# Patient Record
Sex: FEMALE | Race: WHITE | Employment: PART TIME | URBAN - NONMETROPOLITAN AREA
[De-identification: names, ages, dates, MRNs, and addresses within clinical notes are randomized per-mention and may not be internally consistent; named-entity substitution may affect disease eponyms.]

---

## 2017-05-08 ENCOUNTER — TELEPHONE (OUTPATIENT)
Dept: FAMILY MEDICINE CLINIC | Facility: CLINIC | Age: 62
End: 2017-05-08

## 2017-05-08 NOTE — TELEPHONE ENCOUNTER
She may see me, if uninsured this is a very expensive issue. To diagnose appendicitis takes at the very least an U/S and then to treat removal ie surgery.

## 2017-05-08 NOTE — TELEPHONE ENCOUNTER
Patient said that she has been having pain since last Thursday to her RLQ that has gradually gotten worse. She said she gets very warm and has been sweating but has not checked her temperature.  She went to the chiropractor last week and she pressed on her

## 2017-05-08 NOTE — TELEPHONE ENCOUNTER
Patient advised, she said she does have "[employer]" [facility], she asked if Dr Tiesha Edge would order the ultrasound so she can try to have it done at [facility] today.

## 2017-05-09 ENCOUNTER — LAB ENCOUNTER (OUTPATIENT)
Dept: LAB | Age: 62
End: 2017-05-09
Attending: INTERNAL MEDICINE
Payer: COMMERCIAL

## 2017-05-09 ENCOUNTER — OFFICE VISIT (OUTPATIENT)
Dept: FAMILY MEDICINE CLINIC | Facility: CLINIC | Age: 62
End: 2017-05-09

## 2017-05-09 VITALS
TEMPERATURE: 99 F | SYSTOLIC BLOOD PRESSURE: 136 MMHG | HEART RATE: 88 BPM | WEIGHT: 209.38 LBS | DIASTOLIC BLOOD PRESSURE: 70 MMHG | OXYGEN SATURATION: 99 % | BODY MASS INDEX: 32.48 KG/M2 | HEIGHT: 67.5 IN

## 2017-05-09 DIAGNOSIS — R10.31 RIGHT LOWER QUADRANT ABDOMINAL PAIN: ICD-10-CM

## 2017-05-09 DIAGNOSIS — R53.82 CHRONIC FATIGUE: Primary | ICD-10-CM

## 2017-05-09 DIAGNOSIS — R53.82 CHRONIC FATIGUE: ICD-10-CM

## 2017-05-09 PROCEDURE — 36415 COLL VENOUS BLD VENIPUNCTURE: CPT | Performed by: INTERNAL MEDICINE

## 2017-05-09 PROCEDURE — 80061 LIPID PANEL: CPT | Performed by: INTERNAL MEDICINE

## 2017-05-09 PROCEDURE — 99214 OFFICE O/P EST MOD 30 MIN: CPT | Performed by: INTERNAL MEDICINE

## 2017-05-09 PROCEDURE — 80050 GENERAL HEALTH PANEL: CPT | Performed by: INTERNAL MEDICINE

## 2017-05-09 RX ORDER — CETIRIZINE HYDROCHLORIDE 10 MG/1
10 TABLET ORAL DAILY
COMMUNITY
End: 2018-04-26 | Stop reason: ALTCHOICE

## 2017-05-09 NOTE — PROGRESS NOTES
Carlyle Castrejon is a 57year old female. HPI:   Right lower abdominal pain x 1 week. Pain can be in the flank as well. No change in stool and no blood in the urine. She has been eating ok, just feels very full and bloated.      Current Outpatient Prescri lesions  HEENT: atraumatic, normocephalic,ears and throat are clear  NECK: supple,no adenopathy,no bruits  LUNGS: clear to auscultation  CARDIO: RRR without murmur  GI: good BS's,no masses, tenderness in RLQ, but no rebound.    EXTREMITIES: no cyanosis, clu

## 2017-05-10 ENCOUNTER — TELEPHONE (OUTPATIENT)
Dept: FAMILY MEDICINE CLINIC | Facility: CLINIC | Age: 62
End: 2017-05-10

## 2017-05-10 DIAGNOSIS — M81.0 OSTEOPOROSIS OF LUMBAR SPINE: Primary | ICD-10-CM

## 2017-05-10 DIAGNOSIS — Z12.31 SCREENING MAMMOGRAM, ENCOUNTER FOR: ICD-10-CM

## 2017-05-10 NOTE — TELEPHONE ENCOUNTER
Pt is requesting an order for a mammogram and a dexa.  Per Epic pt is not due for another dexa until 2018 but pt states that due to her numbers, pt was advised to have it sooner by Dr. Rafael Machado of labs placed upfront for pt per pt request

## 2017-05-11 NOTE — TELEPHONE ENCOUNTER
Patient advised, she states that she will hold off on the dexa scan until next year.  Patient advised the mammogram order was faxed to [facility].

## 2017-07-03 ENCOUNTER — OFFICE VISIT (OUTPATIENT)
Dept: FAMILY MEDICINE CLINIC | Facility: CLINIC | Age: 62
End: 2017-07-03

## 2017-07-03 ENCOUNTER — TELEPHONE (OUTPATIENT)
Dept: FAMILY MEDICINE CLINIC | Facility: CLINIC | Age: 62
End: 2017-07-03

## 2017-07-03 VITALS
HEIGHT: 67 IN | HEART RATE: 82 BPM | DIASTOLIC BLOOD PRESSURE: 80 MMHG | TEMPERATURE: 99 F | WEIGHT: 211 LBS | BODY MASS INDEX: 33.12 KG/M2 | SYSTOLIC BLOOD PRESSURE: 130 MMHG

## 2017-07-03 DIAGNOSIS — Z12.39 SCREENING FOR BREAST CANCER: ICD-10-CM

## 2017-07-03 DIAGNOSIS — R42 DIZZINESS: Primary | ICD-10-CM

## 2017-07-03 PROCEDURE — 99213 OFFICE O/P EST LOW 20 MIN: CPT | Performed by: INTERNAL MEDICINE

## 2017-07-03 RX ORDER — FUROSEMIDE 20 MG/1
20 TABLET ORAL 2 TIMES DAILY
Qty: 30 TABLET | Refills: 0 | Status: SHIPPED | OUTPATIENT
Start: 2017-07-03 | End: 2017-07-03

## 2017-07-03 RX ORDER — FUROSEMIDE 20 MG/1
20 TABLET ORAL DAILY
Qty: 30 TABLET | Refills: 0 | COMMUNITY
Start: 2017-07-03 | End: 2017-07-18 | Stop reason: DRUGHIGH

## 2017-07-03 NOTE — PROGRESS NOTES
HPI:   Alec Keen is a 60year old female who presents for upper respiratory symptoms for  10  days. Patient reports dizziness in the AM.        Current Outpatient Prescriptions:  cetirizine 10 MG Oral Tab Take 10 mg by mouth daily.  Disp:  Rfl:    tri pain  NEURO: denies headaches    EXAM:   /80 (BP Location: Right arm, Patient Position: Sitting, Cuff Size: adult)   Pulse 82   Temp 98.8 °F (37.1 °C) (Temporal)   Ht 67\"   Wt 211 lb   BMI 33.05 kg/m²   GENERAL: well developed, well nourished,in no

## 2017-07-03 NOTE — TELEPHONE ENCOUNTER
Patient said she thought Dr Lobito Mckoy wanted her to take Furosemide once daily the directions say to take twice daily.

## 2017-07-06 ENCOUNTER — HOSPITAL ENCOUNTER (OUTPATIENT)
Dept: MAMMOGRAPHY | Age: 62
Discharge: HOME | End: 2017-07-06
Attending: INTERNAL MEDICINE
Payer: COMMERCIAL

## 2017-07-06 DIAGNOSIS — Z12.39 SCREENING FOR BREAST CANCER: ICD-10-CM

## 2017-07-06 PROCEDURE — 77067 SCR MAMMO BI INCL CAD: CPT | Performed by: INTERNAL MEDICINE

## 2017-07-17 ENCOUNTER — OFFICE VISIT (OUTPATIENT)
Dept: FAMILY MEDICINE CLINIC | Facility: CLINIC | Age: 62
End: 2017-07-17

## 2017-07-17 ENCOUNTER — APPOINTMENT (OUTPATIENT)
Dept: LAB | Age: 62
End: 2017-07-17
Attending: INTERNAL MEDICINE
Payer: COMMERCIAL

## 2017-07-17 VITALS
WEIGHT: 209.38 LBS | HEART RATE: 80 BPM | TEMPERATURE: 98 F | HEIGHT: 67 IN | BODY MASS INDEX: 32.86 KG/M2 | RESPIRATION RATE: 18 BRPM | DIASTOLIC BLOOD PRESSURE: 70 MMHG | SYSTOLIC BLOOD PRESSURE: 118 MMHG

## 2017-07-17 DIAGNOSIS — I10 HTN (HYPERTENSION), BENIGN: Primary | ICD-10-CM

## 2017-07-17 DIAGNOSIS — I10 HTN (HYPERTENSION), BENIGN: ICD-10-CM

## 2017-07-17 LAB
ALBUMIN SERPL-MCNC: 3.9 G/DL (ref 3.5–4.8)
ALP LIVER SERPL-CCNC: 82 U/L (ref 50–130)
ALT SERPL-CCNC: 25 U/L (ref 14–54)
AST SERPL-CCNC: 17 U/L (ref 15–41)
BILIRUB SERPL-MCNC: 0.4 MG/DL (ref 0.1–2)
BUN BLD-MCNC: 10 MG/DL (ref 8–20)
CALCIUM BLD-MCNC: 9 MG/DL (ref 8.3–10.3)
CHLORIDE: 107 MMOL/L (ref 101–111)
CO2: 24 MMOL/L (ref 22–32)
CREAT BLD-MCNC: 0.59 MG/DL (ref 0.55–1.02)
GLUCOSE BLD-MCNC: 85 MG/DL (ref 70–99)
M PROTEIN MFR SERPL ELPH: 7.2 G/DL (ref 6.1–8.3)
POTASSIUM SERPL-SCNC: 3.8 MMOL/L (ref 3.6–5.1)
SODIUM SERPL-SCNC: 141 MMOL/L (ref 136–144)

## 2017-07-17 PROCEDURE — 99214 OFFICE O/P EST MOD 30 MIN: CPT | Performed by: INTERNAL MEDICINE

## 2017-07-17 PROCEDURE — 36415 COLL VENOUS BLD VENIPUNCTURE: CPT | Performed by: INTERNAL MEDICINE

## 2017-07-17 PROCEDURE — 80053 COMPREHEN METABOLIC PANEL: CPT | Performed by: INTERNAL MEDICINE

## 2017-07-17 RX ORDER — FUROSEMIDE 20 MG/1
20 TABLET ORAL DAILY
Qty: 90 TABLET | Refills: 0 | Status: SHIPPED | OUTPATIENT
Start: 2017-07-17 | End: 2017-07-18 | Stop reason: DRUGHIGH

## 2017-07-17 NOTE — PROGRESS NOTES
Vernon Grier is a 60year old female. HPI:   Patient presents for recheck of her hypertension. Pt has been taking medications as instructed, no medication side effects, home BP monitoring in the range of 347-985'R systolic and 05-54'R diastolic. Date   • Abdominal pain, unspecified site 07/07/2011   • Acute sinusitis, unspecified 08/20/2011   • Allergic rhinitis, cause unspecified 3/29/2010   • Bronchitis, not specified as acute or chronic 3/17/2011   • Disequilibrium    • Dizziness and giddiness plan.  The patient is asked to return in 6 week. Dalia Vee

## 2017-07-18 ENCOUNTER — TELEPHONE (OUTPATIENT)
Dept: FAMILY MEDICINE CLINIC | Facility: CLINIC | Age: 62
End: 2017-07-18

## 2017-07-18 DIAGNOSIS — E87.6 HYPOPOTASSEMIA: Primary | ICD-10-CM

## 2017-07-18 RX ORDER — FUROSEMIDE 20 MG/1
TABLET ORAL
COMMUNITY
Start: 2017-07-18 | End: 2017-07-19

## 2017-07-18 NOTE — TELEPHONE ENCOUNTER
FUROSEMIDE 20MG IS TOO SMALL TO CUT IN HALF, ASKING FOR FUROSEMIDE 10MG  CALL TO [facility] IN [place].     HAS NOT PICKED UP OTHER SCRIPT

## 2017-07-18 NOTE — TELEPHONE ENCOUNTER
----- Message from Geovany Vasques MD sent at 7/18/2017  8:09 AM CDT -----  Potassium 3.8 was 4.1 before lasix we got good diuresis and cut the dose to 10 mg and recheck potassium with OV in 3 months.

## 2017-07-19 RX ORDER — FUROSEMIDE 20 MG/1
TABLET ORAL
Qty: 45 TABLET | Refills: 1 | Status: SHIPPED | OUTPATIENT
Start: 2017-07-19 | End: 2018-03-20

## 2017-10-09 ENCOUNTER — OFFICE VISIT (OUTPATIENT)
Dept: FAMILY MEDICINE CLINIC | Facility: CLINIC | Age: 62
End: 2017-10-09

## 2017-10-09 ENCOUNTER — TELEPHONE (OUTPATIENT)
Dept: FAMILY MEDICINE CLINIC | Facility: CLINIC | Age: 62
End: 2017-10-09

## 2017-10-09 VITALS — RESPIRATION RATE: 16 BRPM | BODY MASS INDEX: 32.27 KG/M2 | HEIGHT: 67.5 IN | WEIGHT: 208 LBS

## 2017-10-09 DIAGNOSIS — J40 BRONCHITIS: Primary | ICD-10-CM

## 2017-10-09 PROCEDURE — 99213 OFFICE O/P EST LOW 20 MIN: CPT | Performed by: NURSE PRACTITIONER

## 2017-10-09 RX ORDER — ALBUTEROL SULFATE 90 UG/1
2 AEROSOL, METERED RESPIRATORY (INHALATION) EVERY 4 HOURS PRN
Qty: 1 INHALER | Refills: 0 | Status: SHIPPED | OUTPATIENT
Start: 2017-10-09 | End: 2018-03-20 | Stop reason: ALTCHOICE

## 2017-10-09 RX ORDER — AZITHROMYCIN 250 MG/1
TABLET, FILM COATED ORAL
Qty: 6 TABLET | Refills: 0 | Status: SHIPPED | OUTPATIENT
Start: 2017-10-09 | End: 2017-10-16 | Stop reason: ALTCHOICE

## 2017-10-09 RX ORDER — CODEINE PHOSPHATE AND GUAIFENESIN 10; 100 MG/5ML; MG/5ML
10 SOLUTION ORAL 3 TIMES DAILY PRN
Qty: 180 ML | Refills: 0 | Status: SHIPPED | OUTPATIENT
Start: 2017-10-09 | End: 2018-03-20 | Stop reason: ALTCHOICE

## 2017-10-09 NOTE — TELEPHONE ENCOUNTER
HAS HAD A COLD FOR LAST 5 DAYS, FEELS LIKE IT HAS GONE INTO HER CHEST, HAS BEEN USING INHALER, IT'S THE ONLY WAY SHE CAN SLEEP AT NIGHT, WANTS APPT TODAY, NO OPENINGS, CALL PT

## 2017-10-09 NOTE — PROGRESS NOTES
CHIEF COMPLAINT:   Patient presents with:  Cough/URI: x 1 week        HPI:   Yimi Anaya is a 60year old female who presents for cough for  1  weeks. Cough started gradually and is described as tight and deep. Patient has history of bronchitis.  This • Other urinary incontinence 3/29/2010   • Pure hypercholesterolemia 3/29/2010   • Sprain of neck 1/20/2011      Social History:  Smoking status: Never Smoker                                                              Smokeless tobacco: Never Used Albuterol Sulfate  (90 Base) MCG/ACT Inhalation Aero Soln 1 Inhaler 0      Sig: Inhale 2 puffs into the lungs every 4 (four) hours as needed for Wheezing (cough).       azithromycin 250 MG Oral Tab 6 tablet 0      Sig: Take two tablets by mouth toda · Your appetite may be poor, so a light diet is fine. Avoid dehydration by drinking 6 to 8 glasses of fluids per day (such as water, soft drinks, sports drinks, juices, tea, or soup). Extra fluids will help loosen secretions in the nose and lungs.   · Over-

## 2017-10-16 ENCOUNTER — OFFICE VISIT (OUTPATIENT)
Dept: FAMILY MEDICINE CLINIC | Facility: CLINIC | Age: 62
End: 2017-10-16

## 2017-10-16 ENCOUNTER — APPOINTMENT (OUTPATIENT)
Dept: LAB | Age: 62
End: 2017-10-16
Attending: INTERNAL MEDICINE
Payer: COMMERCIAL

## 2017-10-16 VITALS
BODY MASS INDEX: 32 KG/M2 | DIASTOLIC BLOOD PRESSURE: 82 MMHG | SYSTOLIC BLOOD PRESSURE: 120 MMHG | TEMPERATURE: 99 F | WEIGHT: 208 LBS

## 2017-10-16 DIAGNOSIS — E87.6 HYPOKALEMIA: ICD-10-CM

## 2017-10-16 DIAGNOSIS — E87.6 HYPOKALEMIA: Primary | ICD-10-CM

## 2017-10-16 DIAGNOSIS — E87.6 HYPOPOTASSEMIA: ICD-10-CM

## 2017-10-16 PROCEDURE — 99214 OFFICE O/P EST MOD 30 MIN: CPT | Performed by: INTERNAL MEDICINE

## 2017-10-16 PROCEDURE — 80053 COMPREHEN METABOLIC PANEL: CPT | Performed by: INTERNAL MEDICINE

## 2017-10-16 PROCEDURE — 84439 ASSAY OF FREE THYROXINE: CPT | Performed by: INTERNAL MEDICINE

## 2017-10-16 PROCEDURE — 36415 COLL VENOUS BLD VENIPUNCTURE: CPT | Performed by: INTERNAL MEDICINE

## 2017-10-16 PROCEDURE — 84443 ASSAY THYROID STIM HORMONE: CPT | Performed by: INTERNAL MEDICINE

## 2017-10-16 NOTE — PROGRESS NOTES
Brandie Ramirez is a 60year old female. HPI:   Irritated cough.   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      Current Outpatient Prescriptions:  Albuterol Sulfate  (90 Base) MCG/ACT Inhalation Aero Soln Inhale 2 puffs into th exertion  GI: denies abdominal pain and denies heartburn  NEURO: denies headaches    EXAM:   /80   Temp 99 °F (37.2 °C) (Temporal)   Wt 208 lb   BMI 32.10 kg/m²   GENERAL: well developed, well nourished,in no apparent distress  SKIN: no rashes,no vitor

## 2018-01-09 ENCOUNTER — OFFICE VISIT (OUTPATIENT)
Dept: FAMILY MEDICINE CLINIC | Facility: CLINIC | Age: 63
End: 2018-01-09

## 2018-01-09 ENCOUNTER — TELEPHONE (OUTPATIENT)
Dept: FAMILY MEDICINE CLINIC | Facility: CLINIC | Age: 63
End: 2018-01-09

## 2018-01-09 VITALS
WEIGHT: 212 LBS | HEIGHT: 67.5 IN | DIASTOLIC BLOOD PRESSURE: 70 MMHG | BODY MASS INDEX: 32.89 KG/M2 | TEMPERATURE: 98 F | SYSTOLIC BLOOD PRESSURE: 136 MMHG

## 2018-01-09 DIAGNOSIS — L03.012 PARONYCHIA OF FINGER, LEFT: Primary | ICD-10-CM

## 2018-01-09 PROCEDURE — 99214 OFFICE O/P EST MOD 30 MIN: CPT | Performed by: INTERNAL MEDICINE

## 2018-01-09 RX ORDER — CEPHALEXIN 750 MG/1
750 CAPSULE ORAL 4 TIMES DAILY
Qty: 40 CAPSULE | Refills: 0 | Status: SHIPPED | OUTPATIENT
Start: 2018-01-09 | End: 2018-01-09

## 2018-01-09 RX ORDER — CEPHALEXIN 750 MG/1
750 CAPSULE ORAL 4 TIMES DAILY
Qty: 40 CAPSULE | Refills: 0 | Status: SHIPPED | OUTPATIENT
Start: 2018-01-09 | End: 2018-03-20

## 2018-01-09 NOTE — PROGRESS NOTES
Jaison Turner is a 60year old female. HPI:   Pt has an infection on the left index finger for the past 4 days. She has been soaking it, but it has not decompressed.       Current Outpatient Prescriptions:  cephALEXin (KEFLEX) 750 MG Oral Cap Take 1 cap feels well otherwise  SKIN: denies any unusual skin lesions or rashes  RESPIRATORY: denies shortness of breath with exertion  CARDIOVASCULAR: denies chest pain on exertion  GI: denies abdominal pain and denies heartburn  NEURO: denies headaches    EXAM:

## 2018-01-09 NOTE — TELEPHONE ENCOUNTER
The med that was called to [facility] today is out of stock and they will not be getting any until tomorrow. She is asking if this med could be called to [facility] in [facility], [facility]..

## 2018-03-20 ENCOUNTER — LABORATORY ENCOUNTER (OUTPATIENT)
Dept: LAB | Age: 63
End: 2018-03-20
Attending: INTERNAL MEDICINE
Payer: COMMERCIAL

## 2018-03-20 ENCOUNTER — OFFICE VISIT (OUTPATIENT)
Dept: FAMILY MEDICINE CLINIC | Facility: CLINIC | Age: 63
End: 2018-03-20

## 2018-03-20 VITALS
OXYGEN SATURATION: 97 % | HEART RATE: 73 BPM | TEMPERATURE: 98 F | DIASTOLIC BLOOD PRESSURE: 70 MMHG | HEIGHT: 67 IN | WEIGHT: 211.13 LBS | BODY MASS INDEX: 33.14 KG/M2 | SYSTOLIC BLOOD PRESSURE: 138 MMHG

## 2018-03-20 DIAGNOSIS — L30.9 DERMATITIS: ICD-10-CM

## 2018-03-20 DIAGNOSIS — H25.012 CORTICAL AGE-RELATED CATARACT OF LEFT EYE: ICD-10-CM

## 2018-03-20 DIAGNOSIS — Z00.00 WELL ADULT EXAM: Primary | ICD-10-CM

## 2018-03-20 DIAGNOSIS — M85.80 OSTEOPENIA, UNSPECIFIED LOCATION: ICD-10-CM

## 2018-03-20 DIAGNOSIS — B96.89 BV (BACTERIAL VAGINOSIS): ICD-10-CM

## 2018-03-20 DIAGNOSIS — N76.0 BV (BACTERIAL VAGINOSIS): ICD-10-CM

## 2018-03-20 DIAGNOSIS — Z00.00 WELL ADULT EXAM: ICD-10-CM

## 2018-03-20 LAB
25-HYDROXYVITAMIN D (TOTAL): 57.2 NG/ML (ref 30–100)
ALBUMIN SERPL-MCNC: 4.1 G/DL (ref 3.5–4.8)
ALP LIVER SERPL-CCNC: 83 U/L (ref 50–130)
ALT SERPL-CCNC: 28 U/L (ref 14–54)
AST SERPL-CCNC: 17 U/L (ref 15–41)
BASOPHILS # BLD AUTO: 0.07 X10(3) UL (ref 0–0.1)
BASOPHILS NFR BLD AUTO: 1 %
BILIRUB SERPL-MCNC: 0.6 MG/DL (ref 0.1–2)
BUN BLD-MCNC: 12 MG/DL (ref 8–20)
CALCIUM BLD-MCNC: 8.6 MG/DL (ref 8.3–10.3)
CHLORIDE: 107 MMOL/L (ref 101–111)
CHOLEST SMN-MCNC: 261 MG/DL (ref ?–200)
CO2: 23 MMOL/L (ref 22–32)
CREAT BLD-MCNC: 0.71 MG/DL (ref 0.55–1.02)
EOSINOPHIL # BLD AUTO: 0.13 X10(3) UL (ref 0–0.3)
EOSINOPHIL NFR BLD AUTO: 1.9 %
ERYTHROCYTE [DISTWIDTH] IN BLOOD BY AUTOMATED COUNT: 12.9 % (ref 11.5–16)
GLUCOSE BLD-MCNC: 99 MG/DL (ref 70–99)
HCT VFR BLD AUTO: 42.8 % (ref 34–50)
HDLC SERPL-MCNC: 49 MG/DL (ref 45–?)
HDLC SERPL: 5.33 {RATIO} (ref ?–4.44)
HGB BLD-MCNC: 14.2 G/DL (ref 12–16)
IMMATURE GRANULOCYTE COUNT: 0.01 X10(3) UL (ref 0–1)
IMMATURE GRANULOCYTE RATIO %: 0.1 %
LDLC SERPL CALC-MCNC: 180 MG/DL (ref ?–130)
LYMPHOCYTES # BLD AUTO: 1.71 X10(3) UL (ref 0.9–4)
LYMPHOCYTES NFR BLD AUTO: 25.4 %
M PROTEIN MFR SERPL ELPH: 7.4 G/DL (ref 6.1–8.3)
MCH RBC QN AUTO: 29.9 PG (ref 27–33.2)
MCHC RBC AUTO-ENTMCNC: 33.2 G/DL (ref 31–37)
MCV RBC AUTO: 90.1 FL (ref 81–100)
MONOCYTES # BLD AUTO: 0.86 X10(3) UL (ref 0.1–1)
MONOCYTES NFR BLD AUTO: 12.8 %
NEUTROPHIL ABS PRELIM: 3.94 X10 (3) UL (ref 1.3–6.7)
NEUTROPHILS # BLD AUTO: 3.94 X10(3) UL (ref 1.3–6.7)
NEUTROPHILS NFR BLD AUTO: 58.8 %
NONHDLC SERPL-MCNC: 212 MG/DL (ref ?–130)
PLATELET # BLD AUTO: 314 10(3)UL (ref 150–450)
POTASSIUM SERPL-SCNC: 3.9 MMOL/L (ref 3.6–5.1)
RBC # BLD AUTO: 4.75 X10(6)UL (ref 3.8–5.1)
RED CELL DISTRIBUTION WIDTH-SD: 42.6 FL (ref 35.1–46.3)
SODIUM SERPL-SCNC: 141 MMOL/L (ref 136–144)
TRIGL SERPL-MCNC: 159 MG/DL (ref ?–150)
TSI SER-ACNC: 0.96 MIU/ML (ref 0.35–5.5)
VLDLC SERPL CALC-MCNC: 32 MG/DL (ref 5–40)
WBC # BLD AUTO: 6.7 X10(3) UL (ref 4–13)

## 2018-03-20 PROCEDURE — 82306 VITAMIN D 25 HYDROXY: CPT

## 2018-03-20 PROCEDURE — 84443 ASSAY THYROID STIM HORMONE: CPT

## 2018-03-20 PROCEDURE — 36415 COLL VENOUS BLD VENIPUNCTURE: CPT

## 2018-03-20 PROCEDURE — 80053 COMPREHEN METABOLIC PANEL: CPT

## 2018-03-20 PROCEDURE — 80061 LIPID PANEL: CPT

## 2018-03-20 PROCEDURE — 99396 PREV VISIT EST AGE 40-64: CPT | Performed by: INTERNAL MEDICINE

## 2018-03-20 PROCEDURE — 85025 COMPLETE CBC W/AUTO DIFF WBC: CPT

## 2018-03-20 RX ORDER — DIAPER,BRIEF,INFANT-TODD,DISP
1 EACH MISCELLANEOUS 2 TIMES DAILY
Qty: 30 G | Refills: 0 | Status: SHIPPED | OUTPATIENT
Start: 2018-03-20 | End: 2018-04-26 | Stop reason: ALTCHOICE

## 2018-03-20 RX ORDER — METRONIDAZOLE 500 MG/1
500 TABLET ORAL 3 TIMES DAILY
Qty: 21 TABLET | Refills: 0 | Status: SHIPPED | OUTPATIENT
Start: 2018-03-20 | End: 2018-04-26 | Stop reason: ALTCHOICE

## 2018-03-20 RX ORDER — FUROSEMIDE 20 MG/1
TABLET ORAL
Qty: 45 TABLET | Refills: 1 | Status: SHIPPED | OUTPATIENT
Start: 2018-03-20 | End: 2018-07-10 | Stop reason: ALTCHOICE

## 2018-03-20 NOTE — PROGRESS NOTES
HPI:   Carlyle Castrejon is a 60year old female who presents for a complete physical exam. Symptoms: denies discharge, itching, burning or dysuria, is menopausal. Patient complains of right hip and leg pain x 2 years progressive, using massage and tumeric s History:   Diagnosis Date   • Abdominal pain, unspecified site 07/07/2011   • Acute sinusitis, unspecified 08/20/2011   • Allergic rhinitis, cause unspecified 3/29/2010   • Bronchitis, not specified as acute or chronic 3/17/2011   • Disequilibrium    • Froilan Body mass index is 33.07 kg/m².    GENERAL: well developed, well nourished,in no apparent distress  SKIN: no rashes,no suspicious lesions  HEENT: atraumatic, normocephalic,ears and throat are clear  EYES:PERRLA, EOMI, normal optic disk,conjunctiva are karina

## 2018-03-21 ENCOUNTER — TELEPHONE (OUTPATIENT)
Dept: FAMILY MEDICINE CLINIC | Facility: CLINIC | Age: 63
End: 2018-03-21

## 2018-03-22 ENCOUNTER — MED REC SCAN ONLY (OUTPATIENT)
Dept: FAMILY MEDICINE CLINIC | Facility: CLINIC | Age: 63
End: 2018-03-22

## 2018-04-04 ENCOUNTER — TELEPHONE (OUTPATIENT)
Dept: FAMILY MEDICINE CLINIC | Facility: CLINIC | Age: 63
End: 2018-04-04

## 2018-04-04 DIAGNOSIS — H26.9 CATARACT OF RIGHT EYE, UNSPECIFIED CATARACT TYPE: Primary | ICD-10-CM

## 2018-04-04 NOTE — TELEPHONE ENCOUNTER
Pt went to optometrist for the cataract surgery for her left eye. Looks like they are going to need to do the right eye also. Pt thinks she is going to need a referral for the right eye then also.

## 2018-04-26 ENCOUNTER — OFFICE VISIT (OUTPATIENT)
Dept: FAMILY MEDICINE CLINIC | Facility: CLINIC | Age: 63
End: 2018-04-26

## 2018-04-26 VITALS
WEIGHT: 208 LBS | BODY MASS INDEX: 32.65 KG/M2 | OXYGEN SATURATION: 97 % | TEMPERATURE: 98 F | DIASTOLIC BLOOD PRESSURE: 78 MMHG | HEART RATE: 75 BPM | SYSTOLIC BLOOD PRESSURE: 130 MMHG | HEIGHT: 67 IN

## 2018-04-26 DIAGNOSIS — Z01.810 PREOP CARDIOVASCULAR EXAM: Primary | ICD-10-CM

## 2018-04-26 DIAGNOSIS — H25.013 CORTICAL AGE-RELATED CATARACT OF BOTH EYES: ICD-10-CM

## 2018-04-26 PROCEDURE — 93000 ELECTROCARDIOGRAM COMPLETE: CPT | Performed by: INTERNAL MEDICINE

## 2018-04-26 PROCEDURE — 99243 OFF/OP CNSLTJ NEW/EST LOW 30: CPT | Performed by: INTERNAL MEDICINE

## 2018-04-26 RX ORDER — OMEPRAZOLE 20 MG/1
20 TABLET, DELAYED RELEASE ORAL DAILY
COMMUNITY
End: 2018-07-10 | Stop reason: ALTCHOICE

## 2018-04-26 RX ORDER — CETIRIZINE HYDROCHLORIDE 10 MG/1
10 TABLET ORAL DAILY
COMMUNITY
End: 2019-10-25 | Stop reason: ALTCHOICE

## 2018-04-29 NOTE — PROGRESS NOTES
Errol Ghotra is a 60year old female who presents for a pre-operative physical exam.   Errol Ghotra is scheduled for a cataract procedure at [facility] on 5/24/018 performed by Dr Jose Acevedo. HPI related to surgery:   Pt is in good health.      She Blood pressure 130/78, pulse 75, temperature 98.4 °F (36.9 °C), temperature source Temporal, height 67\", weight 208 lb, SpO2 97 %. General: No acute distress. Alert and oriented x 3. HEENT: Moist mucous membranes. EOM-I. PERRL  Neck: No lymphadenopathy.

## 2018-05-10 ENCOUNTER — TELEPHONE (OUTPATIENT)
Dept: FAMILY MEDICINE CLINIC | Facility: CLINIC | Age: 63
End: 2018-05-10

## 2018-05-10 NOTE — TELEPHONE ENCOUNTER
NEED H&P AND SURGICAL CLEARANCE [facility] TO [facility] [place].   FAX: [phone]

## 2018-05-31 ENCOUNTER — HOSPITAL ENCOUNTER (OUTPATIENT)
Dept: BONE DENSITY | Age: 63
Discharge: HOME | End: 2018-05-31
Attending: INTERNAL MEDICINE
Payer: COMMERCIAL

## 2018-05-31 DIAGNOSIS — Z00.00 WELL ADULT EXAM: ICD-10-CM

## 2018-05-31 PROCEDURE — 77080 DXA BONE DENSITY AXIAL: CPT | Performed by: INTERNAL MEDICINE

## 2018-06-01 ENCOUNTER — TELEPHONE (OUTPATIENT)
Dept: FAMILY MEDICINE CLINIC | Facility: CLINIC | Age: 63
End: 2018-06-01

## 2018-07-10 ENCOUNTER — TELEPHONE (OUTPATIENT)
Dept: FAMILY MEDICINE CLINIC | Facility: CLINIC | Age: 63
End: 2018-07-10

## 2018-07-10 ENCOUNTER — OFFICE VISIT (OUTPATIENT)
Dept: FAMILY MEDICINE CLINIC | Facility: CLINIC | Age: 63
End: 2018-07-10

## 2018-07-10 VITALS
DIASTOLIC BLOOD PRESSURE: 70 MMHG | HEART RATE: 83 BPM | HEIGHT: 67 IN | OXYGEN SATURATION: 97 % | SYSTOLIC BLOOD PRESSURE: 142 MMHG | WEIGHT: 211.5 LBS | TEMPERATURE: 99 F | BODY MASS INDEX: 33.19 KG/M2

## 2018-07-10 DIAGNOSIS — R10.84 DIFFUSE ABDOMINAL PAIN: Primary | ICD-10-CM

## 2018-07-10 PROCEDURE — 99214 OFFICE O/P EST MOD 30 MIN: CPT | Performed by: INTERNAL MEDICINE

## 2018-07-10 RX ORDER — GARLIC EXTRACT 500 MG
1 CAPSULE ORAL DAILY
COMMUNITY
End: 2018-11-17 | Stop reason: ALTCHOICE

## 2018-07-10 NOTE — PROGRESS NOTES
Della Art is a 58year old female. HPI:   Pt has nausea and bloating. She has 2 small bowel movements this morning. But she usually has 3 a day and BIG ones.  She has felt a little better,    Current Outpatient Prescriptions:  Acidophilus/Pectin Demario Razo cyanosis, clubbing or edema    ASSESSMENT AND PLAN:     Diffuse abdominal pain  (primary encounter diagnosis)  Liquid diet, rest if anything worse call or go to ER for evaluation, possibilities are partial SBO, small bowel inflammation, diverticulitis, pan

## 2018-07-10 NOTE — TELEPHONE ENCOUNTER
Patient said that she came home after doing a massage last night and she has pain across her flank and to her abdomen near her umbilicus. She said that she also has a headache and her sphincter is tender.  She has been urinating frequently for the last coup

## 2018-07-12 ENCOUNTER — TELEPHONE (OUTPATIENT)
Dept: FAMILY MEDICINE CLINIC | Facility: CLINIC | Age: 63
End: 2018-07-12

## 2018-07-12 DIAGNOSIS — R10.31 ABDOMINAL PAIN, RLQ: Primary | ICD-10-CM

## 2018-07-12 NOTE — TELEPHONE ENCOUNTER
I would suggest she gets a CT abd/pelvic noncontrast, can ne done in [facility]

## 2018-07-12 NOTE — TELEPHONE ENCOUNTER
Patient said that the pain has \"eased up\" and th bloating has decreased. She said she still has some residual pian in her lower abdomen and \"sphincter\". She has been doing a liquid diet and drinking ensure. She did have some watermelon today.  She said

## 2018-07-12 NOTE — TELEPHONE ENCOUNTER
NGUYEN for patient to C[facility], order in [facility], can call central scheduling to have done through [facility] since she is IHP.

## 2018-07-13 ENCOUNTER — TELEPHONE (OUTPATIENT)
Dept: FAMILY MEDICINE CLINIC | Facility: CLINIC | Age: 63
End: 2018-07-13

## 2018-07-13 NOTE — TELEPHONE ENCOUNTER
Pt returned call. Pt was advised of central scheduling number and locations for CT scans. Orders are already placed.

## 2018-07-14 ENCOUNTER — TELEPHONE (OUTPATIENT)
Dept: FAMILY MEDICINE CLINIC | Facility: CLINIC | Age: 63
End: 2018-07-14

## 2018-07-14 ENCOUNTER — HOSPITAL ENCOUNTER (OUTPATIENT)
Dept: CT IMAGING | Facility: HOSPITAL | Age: 63
Discharge: HOME | End: 2018-07-14
Attending: INTERNAL MEDICINE
Payer: COMMERCIAL

## 2018-07-14 DIAGNOSIS — R10.31 ABDOMINAL PAIN, RLQ: ICD-10-CM

## 2018-07-14 PROCEDURE — 74176 CT ABD & PELVIS W/O CONTRAST: CPT | Performed by: INTERNAL MEDICINE

## 2018-07-16 ENCOUNTER — OFFICE VISIT (OUTPATIENT)
Dept: FAMILY MEDICINE CLINIC | Facility: CLINIC | Age: 63
End: 2018-07-16

## 2018-07-16 VITALS
BODY MASS INDEX: 32.65 KG/M2 | WEIGHT: 208 LBS | HEART RATE: 68 BPM | DIASTOLIC BLOOD PRESSURE: 70 MMHG | OXYGEN SATURATION: 97 % | HEIGHT: 67 IN | SYSTOLIC BLOOD PRESSURE: 126 MMHG | TEMPERATURE: 99 F

## 2018-07-16 DIAGNOSIS — K57.92 DIVERTICULITIS: Primary | ICD-10-CM

## 2018-07-16 PROCEDURE — 99214 OFFICE O/P EST MOD 30 MIN: CPT | Performed by: INTERNAL MEDICINE

## 2018-07-17 NOTE — PROGRESS NOTES
Karin Paulson is a 58year old female. HPI:   Feeling better. She has been taking levaquin and flagyl and less distended.      Current Outpatient Prescriptions:  metRONIDAZOLE (FLAGYL) 500 MG Oral Tab Take 1 tablet (500 mg total) by mouth 3 (three) ti clear  NECK: supple,no adenopathy,no bruits  LUNGS: clear to auscultation  CARDIO: RRR without murmur  GI: good BS's,no masses, HSM or tenderness  EXTREMITIES: no cyanosis, clubbing or edema    ASSESSMENT AND PLAN:   Diverticulitis  (primary encounter diag

## 2018-09-22 ENCOUNTER — TELEPHONE (OUTPATIENT)
Dept: FAMILY MEDICINE CLINIC | Facility: CLINIC | Age: 63
End: 2018-09-22

## 2018-09-22 DIAGNOSIS — Z98.41 STATUS POST RIGHT CATARACT EXTRACTION: Primary | ICD-10-CM

## 2018-09-22 NOTE — TELEPHONE ENCOUNTER
Had cataract surgery in the spring. Currently having an issue with the right eye and wants to have the surgeon look at it. Referral placed, patient to p/u.

## 2018-09-26 ENCOUNTER — OFFICE VISIT (OUTPATIENT)
Dept: FAMILY MEDICINE CLINIC | Facility: CLINIC | Age: 63
End: 2018-09-26

## 2018-09-26 VITALS
TEMPERATURE: 99 F | DIASTOLIC BLOOD PRESSURE: 70 MMHG | WEIGHT: 211 LBS | BODY MASS INDEX: 33 KG/M2 | HEART RATE: 74 BPM | OXYGEN SATURATION: 98 % | SYSTOLIC BLOOD PRESSURE: 136 MMHG

## 2018-09-26 DIAGNOSIS — H65.92 SEROMUCINOUS OTITIS MEDIA, LEFT: Primary | ICD-10-CM

## 2018-09-26 PROCEDURE — 99213 OFFICE O/P EST LOW 20 MIN: CPT | Performed by: FAMILY MEDICINE

## 2018-09-26 RX ORDER — METHYLPREDNISOLONE 4 MG/1
TABLET ORAL
Qty: 1 KIT | Refills: 0 | Status: SHIPPED | OUTPATIENT
Start: 2018-09-26 | End: 2018-10-09 | Stop reason: ALTCHOICE

## 2018-09-26 NOTE — PROGRESS NOTES
Fallon Murry is a 58year old female. Patient presents with:  Dizziness:  LEFT EAR PAIN-- RM 6    Subjective   HPI:   Patient is left ear pain. This is rather new. She has no fever or chills.   She has pain in the left side, and this started in the Encounters:  09/26/18 : 136/70  07/16/18 : 126/70  07/10/18 : 142/70  04/26/18 : 130/78  03/20/18 : 138/70  01/09/18 : 136/70      Wt Readings from Last 6 Encounters:  09/26/18 : 211 lb  07/16/18 : 208 lb  07/10/18 : 211 lb 8 oz  04/26/18 : 208 lb  03/20/1 Tablet Therapy Pack 1 kit 0     Sig: As directed. Brady Higuera M.D., [facility]      The patient indicates understanding of these issues and agrees to the plan.

## 2018-10-09 ENCOUNTER — TELEPHONE (OUTPATIENT)
Dept: FAMILY MEDICINE CLINIC | Facility: CLINIC | Age: 63
End: 2018-10-09

## 2018-10-09 ENCOUNTER — OFFICE VISIT (OUTPATIENT)
Dept: FAMILY MEDICINE CLINIC | Facility: CLINIC | Age: 63
End: 2018-10-09

## 2018-10-09 VITALS
HEART RATE: 80 BPM | WEIGHT: 213 LBS | SYSTOLIC BLOOD PRESSURE: 136 MMHG | TEMPERATURE: 97 F | DIASTOLIC BLOOD PRESSURE: 74 MMHG | BODY MASS INDEX: 33.43 KG/M2 | RESPIRATION RATE: 12 BRPM | HEIGHT: 67 IN

## 2018-10-09 DIAGNOSIS — J40 BRONCHITIS: ICD-10-CM

## 2018-10-09 DIAGNOSIS — J32.9 SINUSITIS, UNSPECIFIED CHRONICITY, UNSPECIFIED LOCATION: Primary | ICD-10-CM

## 2018-10-09 DIAGNOSIS — J40 BRONCHITIS: Primary | ICD-10-CM

## 2018-10-09 PROCEDURE — 99214 OFFICE O/P EST MOD 30 MIN: CPT | Performed by: FAMILY MEDICINE

## 2018-10-09 RX ORDER — ALBUTEROL SULFATE 90 UG/1
2 AEROSOL, METERED RESPIRATORY (INHALATION) EVERY 4 HOURS PRN
Qty: 1 INHALER | Refills: 1 | Status: SHIPPED | OUTPATIENT
Start: 2018-10-09 | End: 2019-04-15 | Stop reason: ALTCHOICE

## 2018-10-09 RX ORDER — AZITHROMYCIN 250 MG/1
TABLET, FILM COATED ORAL
Qty: 6 TABLET | Refills: 0 | Status: SHIPPED | OUTPATIENT
Start: 2018-10-09 | End: 2018-10-30 | Stop reason: ALTCHOICE

## 2018-10-09 RX ORDER — PREDNISONE 20 MG/1
20 TABLET ORAL 2 TIMES DAILY
Qty: 10 TABLET | Refills: 0 | Status: SHIPPED | OUTPATIENT
Start: 2018-10-09 | End: 2018-10-14

## 2018-10-09 RX ORDER — ALBUTEROL SULFATE 90 UG/1
2 AEROSOL, METERED RESPIRATORY (INHALATION) EVERY 4 HOURS PRN
Qty: 1 INHALER | Refills: 0 | Status: SHIPPED | OUTPATIENT
Start: 2018-10-09 | End: 2019-04-15 | Stop reason: ALTCHOICE

## 2018-10-09 NOTE — PROGRESS NOTES
HPI:    Patient ID: Bhavesh Brennan is a 58year old female. X 2 wks  + head sangita / cough    HPI    Review of Systems   Constitutional: Positive for fatigue. Negative for chills and fever. HENT: Positive for sinus pressure and sinus pain.  Negative for placed in this encounter. Meds This Visit:  Requested Prescriptions     Signed Prescriptions Disp Refills   • azithromycin 250 MG Oral Tab 6 tablet 0     Sig: Take two tablets by mouth today, then one daily.    • Albuterol Sulfate  (90 Base) MCG

## 2018-10-09 NOTE — PATIENT INSTRUCTIONS
REST,FLUIDS,ADVIL / TYLENOL PRN fever / body aches  CALL NO CHANGE WORSENING  DISCUSSED WARNING SIGNS  F/U No change 2-3 days  Cont decong  meds as directed

## 2018-10-30 ENCOUNTER — OFFICE VISIT (OUTPATIENT)
Dept: FAMILY MEDICINE CLINIC | Facility: CLINIC | Age: 63
End: 2018-10-30

## 2018-10-30 VITALS
HEART RATE: 78 BPM | DIASTOLIC BLOOD PRESSURE: 80 MMHG | WEIGHT: 214.38 LBS | SYSTOLIC BLOOD PRESSURE: 148 MMHG | BODY MASS INDEX: 34 KG/M2 | TEMPERATURE: 98 F | OXYGEN SATURATION: 97 %

## 2018-10-30 DIAGNOSIS — Z23 NEED FOR VACCINATION: ICD-10-CM

## 2018-10-30 DIAGNOSIS — M79.671 RIGHT FOOT PAIN: Primary | ICD-10-CM

## 2018-10-30 DIAGNOSIS — G47.00 INSOMNIA DISORDER WITH NON-SLEEP DISORDER MENTAL COMORBIDITY: ICD-10-CM

## 2018-10-30 PROCEDURE — 90686 IIV4 VACC NO PRSV 0.5 ML IM: CPT | Performed by: INTERNAL MEDICINE

## 2018-10-30 PROCEDURE — 99214 OFFICE O/P EST MOD 30 MIN: CPT | Performed by: INTERNAL MEDICINE

## 2018-10-30 PROCEDURE — 90471 IMMUNIZATION ADMIN: CPT | Performed by: INTERNAL MEDICINE

## 2018-10-31 NOTE — PROGRESS NOTES
Konstantin Briscoe is a 58year old female. HPI:   Bronchitis getting better, hear to recheck after steroids. She has been sleeping poorly for a month and toña easily. Motivation is poor.   She has pain and swelling in the right ankle since her tendon cys Patient Position: Sitting, Cuff Size: large)   Pulse 78   Temp 97.8 °F (36.6 °C) (Temporal)   Wt 214 lb 6 oz   SpO2 97%   BMI 33.58 kg/m²   GENERAL: well developed, well nourished,in no apparent distress  SKIN: no rashes,no suspicious lesions  HEENT: atrau

## 2018-11-17 ENCOUNTER — HOSPITAL ENCOUNTER (OUTPATIENT)
Dept: GENERAL RADIOLOGY | Age: 63
Discharge: HOME | End: 2018-11-17
Attending: PODIATRIST
Payer: COMMERCIAL

## 2018-11-17 ENCOUNTER — OFFICE VISIT (OUTPATIENT)
Dept: PODIATRY CLINIC | Facility: CLINIC | Age: 63
End: 2018-11-17

## 2018-11-17 DIAGNOSIS — M76.61 ACHILLES BURSITIS OF RIGHT LOWER EXTREMITY: ICD-10-CM

## 2018-11-17 DIAGNOSIS — M77.31 CALCANEAL SPUR OF RIGHT FOOT: Primary | ICD-10-CM

## 2018-11-17 DIAGNOSIS — M92.61 HAGLUND'S DEFORMITY OF RIGHT HEEL: ICD-10-CM

## 2018-11-17 DIAGNOSIS — M77.31 CALCANEAL SPUR OF RIGHT FOOT: ICD-10-CM

## 2018-11-17 PROCEDURE — 73650 X-RAY EXAM OF HEEL: CPT | Performed by: PODIATRIST

## 2018-11-17 PROCEDURE — 99203 OFFICE O/P NEW LOW 30 MIN: CPT | Performed by: PODIATRIST

## 2018-11-20 ENCOUNTER — OFFICE VISIT (OUTPATIENT)
Dept: FAMILY MEDICINE CLINIC | Facility: CLINIC | Age: 63
End: 2018-11-20

## 2018-11-20 VITALS
SYSTOLIC BLOOD PRESSURE: 138 MMHG | WEIGHT: 217 LBS | BODY MASS INDEX: 34 KG/M2 | DIASTOLIC BLOOD PRESSURE: 64 MMHG | HEART RATE: 82 BPM | TEMPERATURE: 98 F | OXYGEN SATURATION: 97 %

## 2018-11-20 DIAGNOSIS — M79.671 RIGHT FOOT PAIN: ICD-10-CM

## 2018-11-20 DIAGNOSIS — M25.512 ACUTE PAIN OF LEFT SHOULDER: Primary | ICD-10-CM

## 2018-11-20 PROCEDURE — 99214 OFFICE O/P EST MOD 30 MIN: CPT | Performed by: INTERNAL MEDICINE

## 2018-11-20 NOTE — PROGRESS NOTES
Milagros Ferrara is a 58year old female. Patient presents with:  Consult: Right heel pain -- No xrays taken. Onset 10/13/2018 pt developed nodules on heel of foot. Site has burning sensation. Heel has had a scab since October 2018.  Icing and elevating fo Maternal Aunt 60   • Breast Cancer Maternal Aunt       Social History    Socioeconomic History      Marital status: OTHER      Spouse name: Not on file      Number of children: Not on file      Years of education: Not on file      Highest education level: heel it is not draining there is no erythema edema but there are multiple nodular masses along the incision line of the right heel. 2. Vascular: She has palpable pulses both dorsalis pedis and posterior tibial   3. Neurologic: The sensation is intact.

## 2018-11-20 NOTE — PROGRESS NOTES
Anne Allen is a 58year old female. HPI:   Pt sleeping better after resolved a personal matter. She has seen the podiatrist and she will have an MRI next week Monday 11/26/2018.   She slipped on the ice last week, fell on her left shoulder and had b Cuff Size: large)   Pulse 82   Temp 97.7 °F (36.5 °C) (Temporal)   Wt 217 lb   SpO2 97%   BMI 33.99 kg/m²   GENERAL: well developed, well nourished,in no apparent distress  SKIN: no rashes,no suspicious lesions  HEENT: atraumatic, normocephalic,ears and th

## 2018-11-26 ENCOUNTER — HOSPITAL ENCOUNTER (OUTPATIENT)
Dept: MRI IMAGING | Age: 63
Discharge: HOME | End: 2018-11-26
Attending: PODIATRIST
Payer: COMMERCIAL

## 2018-11-26 DIAGNOSIS — M92.61 HAGLUND'S DEFORMITY OF RIGHT HEEL: ICD-10-CM

## 2018-11-26 DIAGNOSIS — M76.61 ACHILLES BURSITIS OF RIGHT LOWER EXTREMITY: ICD-10-CM

## 2018-11-26 DIAGNOSIS — M77.31 CALCANEAL SPUR OF RIGHT FOOT: ICD-10-CM

## 2018-11-26 PROCEDURE — 73721 MRI JNT OF LWR EXTRE W/O DYE: CPT | Performed by: PODIATRIST

## 2018-12-01 ENCOUNTER — OFFICE VISIT (OUTPATIENT)
Dept: PODIATRY CLINIC | Facility: CLINIC | Age: 63
End: 2018-12-01

## 2018-12-01 DIAGNOSIS — M77.31 CALCANEAL SPUR OF RIGHT FOOT: Primary | ICD-10-CM

## 2018-12-01 DIAGNOSIS — M76.61 ACHILLES BURSITIS OF RIGHT LOWER EXTREMITY: ICD-10-CM

## 2018-12-01 DIAGNOSIS — M92.61 HAGLUND'S DEFORMITY OF RIGHT HEEL: ICD-10-CM

## 2018-12-01 PROCEDURE — 99213 OFFICE O/P EST LOW 20 MIN: CPT | Performed by: PODIATRIST

## 2018-12-03 ENCOUNTER — TELEPHONE (OUTPATIENT)
Dept: FAMILY MEDICINE CLINIC | Facility: CLINIC | Age: 63
End: 2018-12-03

## 2018-12-03 DIAGNOSIS — M76.61 TENDONITIS, ACHILLES, RIGHT: ICD-10-CM

## 2018-12-03 DIAGNOSIS — M92.61 JUVENILE OSTEOCHONDROSIS OF RIGHT TARSUS: ICD-10-CM

## 2018-12-03 DIAGNOSIS — M77.31 CALCANEAL SPUR OF RIGHT FOOT: Primary | ICD-10-CM

## 2018-12-03 NOTE — TELEPHONE ENCOUNTER
DR BROWNLEE REFERRED PT TO DR Dante Arango. DOES PT STILL NEED DR BROWNLEE'S REFERRAL IF DR YBARRA REFERS PT TO Mike Pickett (PEDIATRIST) FOR A 2ND OPINION.  PLEASE CALL

## 2018-12-03 NOTE — PROGRESS NOTES
Alka Moffett is a 58year old female. Patient presents with:   Follow - Up: here for MRI result review right posterior ankle achilles area, pain 2/10, worse with closed heel shoes         HPI:   This pleasant patient presents to the clinic to review he education: Not on file      Highest education level: Not on file    Tobacco Use      Smoking status: Never Smoker      Smokeless tobacco: Never Used      Tobacco comment: Current Non smoker     Substance and Sexual Activity      Alcohol use: No        Alco trim and debride a hyperkeratotic lesion at that alleviates her symptoms she can get that done periodically as well instead of surgery. Patient understood. The patient indicates understanding of these issues and agrees to the plan.     No Follow-up on f

## 2018-12-20 ENCOUNTER — OFFICE VISIT (OUTPATIENT)
Dept: PODIATRY CLINIC | Facility: CLINIC | Age: 63
End: 2018-12-20

## 2018-12-20 DIAGNOSIS — L84 CALLUS OF FOOT: Primary | ICD-10-CM

## 2018-12-20 PROCEDURE — 99213 OFFICE O/P EST LOW 20 MIN: CPT | Performed by: PODIATRIST

## 2018-12-24 NOTE — PROGRESS NOTES
Isadore Dakin is a 58year old female. Patient presents with: Foot Pain: Pt is here for a follow up on right foot and ankle pain. Posterior achilles tendon area. Pain level 0 now. Pt did see Dr. Marichuy Spencer for a second opinion.          HPI:   Patient pr on file      Highest education level: Not on file    Tobacco Use      Smoking status: Never Smoker      Smokeless tobacco: Never Used      Tobacco comment: Current Non smoker     Substance and Sexual Activity      Alcohol use: No        Alcohol/week: 0.0 o

## 2019-01-03 ENCOUNTER — OFFICE VISIT (OUTPATIENT)
Dept: FAMILY MEDICINE CLINIC | Facility: CLINIC | Age: 64
End: 2019-01-03

## 2019-01-03 VITALS
OXYGEN SATURATION: 96 % | HEIGHT: 67 IN | WEIGHT: 218.5 LBS | DIASTOLIC BLOOD PRESSURE: 80 MMHG | BODY MASS INDEX: 34.29 KG/M2 | TEMPERATURE: 98 F | HEART RATE: 77 BPM | SYSTOLIC BLOOD PRESSURE: 160 MMHG

## 2019-01-03 DIAGNOSIS — R63.5 WEIGHT GAIN: ICD-10-CM

## 2019-01-03 DIAGNOSIS — M77.31 CALCANEAL SPUR OF RIGHT FOOT: Primary | ICD-10-CM

## 2019-01-03 PROCEDURE — 99213 OFFICE O/P EST LOW 20 MIN: CPT | Performed by: INTERNAL MEDICINE

## 2019-01-03 NOTE — PROGRESS NOTES
Milagros Ferrara is a 58year old female. HPI:   Pt has pain in the right achilles. She has been seen podiatry, but would like to go back and see Gerry, who did the surgery. She has gained 10 pounds in the last 2 months.      Current Outpatient Medica well developed, well nourished,in no apparent distress  SKIN: no rashes,no suspicious lesions  HEENT: atraumatic, normocephalic,ears and throat are clear  NECK: supple,no adenopathy,no bruits  LUNGS: clear to auscultation  CARDIO: RRR without murmur  GI: g

## 2019-02-06 ENCOUNTER — TELEPHONE (OUTPATIENT)
Dept: FAMILY MEDICINE CLINIC | Facility: CLINIC | Age: 64
End: 2019-02-06

## 2019-02-06 NOTE — TELEPHONE ENCOUNTER
Here joieo call was about her last BP which was OUT OF RANGE, she just needs it repeated, nurse visit OK, doesn't cost her anything, except time and as I guess.

## 2019-02-06 NOTE — TELEPHONE ENCOUNTER
PT GOT AN AUTOMATED MESSAGE TO MAKE AN APPT. PHYTEL SHOWS CODE FOR HYPERTENSION. PT SAID SHE JUST SAW DR Lennox Donning AND DOESN'T KNOW WHY SHE SHOULD MAKE AN APPT.  PLEASE CALL

## 2019-02-28 ENCOUNTER — NURSE ONLY (OUTPATIENT)
Dept: FAMILY MEDICINE CLINIC | Facility: CLINIC | Age: 64
End: 2019-02-28

## 2019-02-28 VITALS — WEIGHT: 209.19 LBS | DIASTOLIC BLOOD PRESSURE: 76 MMHG | BODY MASS INDEX: 33 KG/M2 | SYSTOLIC BLOOD PRESSURE: 128 MMHG

## 2019-04-15 ENCOUNTER — OFFICE VISIT (OUTPATIENT)
Dept: FAMILY MEDICINE CLINIC | Facility: CLINIC | Age: 64
End: 2019-04-15

## 2019-04-15 VITALS
HEIGHT: 67 IN | HEART RATE: 75 BPM | WEIGHT: 205 LBS | SYSTOLIC BLOOD PRESSURE: 132 MMHG | DIASTOLIC BLOOD PRESSURE: 80 MMHG | TEMPERATURE: 97 F | OXYGEN SATURATION: 97 % | RESPIRATION RATE: 16 BRPM | BODY MASS INDEX: 32.18 KG/M2

## 2019-04-15 DIAGNOSIS — R35.0 URINARY FREQUENCY: Primary | ICD-10-CM

## 2019-04-15 PROCEDURE — 99214 OFFICE O/P EST MOD 30 MIN: CPT | Performed by: FAMILY MEDICINE

## 2019-04-15 PROCEDURE — 87086 URINE CULTURE/COLONY COUNT: CPT | Performed by: FAMILY MEDICINE

## 2019-04-15 PROCEDURE — 81003 URINALYSIS AUTO W/O SCOPE: CPT | Performed by: FAMILY MEDICINE

## 2019-04-15 RX ORDER — CEPHALEXIN 500 MG/1
500 CAPSULE ORAL 3 TIMES DAILY
Qty: 15 CAPSULE | Refills: 0 | Status: SHIPPED | OUTPATIENT
Start: 2019-04-15 | End: 2019-06-10 | Stop reason: ALTCHOICE

## 2019-04-15 NOTE — PROGRESS NOTES
Mila Hernández is a 58year old female. Patient presents with: Other: patient c/o left kidney pain and urinary frequency x 4 days      HPI:   Patient complains of pain to the left lower quadrant/flank that began a few days ago.   She said increased uri any unusual skin lesions or rashes  RESPIRATORY: denies shortness of breath   CARDIOVASCULAR: denies chest pain   GI: denies nausea, vomiting, diarrhea or abdominal pain   NEURO: denies headaches    EXAM:   /80   Pulse 75   Temp 97.1 °F (36.2 °C) (Te mg total) by mouth 3 (three) times daily.        Imaging & Consults:  None

## 2019-06-10 ENCOUNTER — TELEPHONE (OUTPATIENT)
Dept: FAMILY MEDICINE CLINIC | Facility: CLINIC | Age: 64
End: 2019-06-10

## 2019-06-10 ENCOUNTER — HOSPITAL ENCOUNTER (OUTPATIENT)
Dept: MAMMOGRAPHY | Age: 64
Discharge: HOME | End: 2019-06-10
Attending: INTERNAL MEDICINE
Payer: MEDICARE

## 2019-06-10 ENCOUNTER — OFFICE VISIT (OUTPATIENT)
Dept: FAMILY MEDICINE CLINIC | Facility: CLINIC | Age: 64
End: 2019-06-10
Payer: MEDICARE

## 2019-06-10 ENCOUNTER — APPOINTMENT (OUTPATIENT)
Dept: LAB | Age: 64
End: 2019-06-10
Attending: INTERNAL MEDICINE
Payer: MEDICARE

## 2019-06-10 VITALS
DIASTOLIC BLOOD PRESSURE: 70 MMHG | HEIGHT: 67 IN | HEART RATE: 71 BPM | BODY MASS INDEX: 31.72 KG/M2 | WEIGHT: 202.13 LBS | OXYGEN SATURATION: 95 % | SYSTOLIC BLOOD PRESSURE: 134 MMHG | TEMPERATURE: 98 F

## 2019-06-10 DIAGNOSIS — Z13.6 SCREENING FOR CARDIOVASCULAR CONDITION: ICD-10-CM

## 2019-06-10 DIAGNOSIS — E78.00 PURE HYPERCHOLESTEROLEMIA: ICD-10-CM

## 2019-06-10 DIAGNOSIS — Z11.59 NEED FOR HEPATITIS C SCREENING TEST: ICD-10-CM

## 2019-06-10 DIAGNOSIS — Z12.31 VISIT FOR SCREENING MAMMOGRAM: ICD-10-CM

## 2019-06-10 DIAGNOSIS — Z13.1 SCREENING FOR DIABETES MELLITUS: ICD-10-CM

## 2019-06-10 DIAGNOSIS — N39.41 URGE INCONTINENCE: Primary | ICD-10-CM

## 2019-06-10 DIAGNOSIS — M85.80 OSTEOPENIA, UNSPECIFIED LOCATION: ICD-10-CM

## 2019-06-10 DIAGNOSIS — I10 HTN (HYPERTENSION), BENIGN: ICD-10-CM

## 2019-06-10 DIAGNOSIS — M77.31 CALCANEAL SPUR OF RIGHT FOOT: ICD-10-CM

## 2019-06-10 DIAGNOSIS — Z78.0 POSTMENOPAUSAL: ICD-10-CM

## 2019-06-10 DIAGNOSIS — M76.61 TENDONITIS, ACHILLES, RIGHT: ICD-10-CM

## 2019-06-10 DIAGNOSIS — Z00.00 ENCOUNTER FOR ANNUAL HEALTH EXAMINATION: ICD-10-CM

## 2019-06-10 DIAGNOSIS — Z01.419 ENCOUNTER FOR GYNECOLOGICAL EXAMINATION WITHOUT ABNORMAL FINDING: ICD-10-CM

## 2019-06-10 DIAGNOSIS — R35.0 URINARY FREQUENCY: ICD-10-CM

## 2019-06-10 DIAGNOSIS — Z23 NEED FOR VACCINATION: ICD-10-CM

## 2019-06-10 DIAGNOSIS — Z13.0 SCREENING FOR DEFICIENCY ANEMIA: Primary | ICD-10-CM

## 2019-06-10 PROCEDURE — G0101 CA SCREEN;PELVIC/BREAST EXAM: HCPCS | Performed by: INTERNAL MEDICINE

## 2019-06-10 PROCEDURE — G0402 INITIAL PREVENTIVE EXAM: HCPCS | Performed by: INTERNAL MEDICINE

## 2019-06-10 PROCEDURE — 86803 HEPATITIS C AB TEST: CPT

## 2019-06-10 PROCEDURE — 80061 LIPID PANEL: CPT

## 2019-06-10 PROCEDURE — 80053 COMPREHEN METABOLIC PANEL: CPT

## 2019-06-10 PROCEDURE — 36415 COLL VENOUS BLD VENIPUNCTURE: CPT

## 2019-06-10 PROCEDURE — 77067 SCR MAMMO BI INCL CAD: CPT | Performed by: INTERNAL MEDICINE

## 2019-06-10 NOTE — TELEPHONE ENCOUNTER
Patient asked if she is supposed to be going to PT for pelvic floor exercise or if she should try the [facility] Moments.me system. LM for patient to Cb, Dr Alee Bangura wants her to go back to PT and see if this is effective first as the device is very expensive.

## 2019-06-10 NOTE — PROGRESS NOTES
HPI:   Pam Kiran is a 71year old female who presents for a Medicare Initial Preventative Physical Exam (Welcome to Medicare- < 12 months on Medicare). Pt has been well, she is still losing weight. She is eating mostly KETO.  Less carbs and ta tobacco.    CAGE Alcohol screening   Loraine Burdick was screened for Alcohol abuse and had a score of 0 so is at low risk.     Patient Care Team: Patient Care Team:  Paula Alejandra MD as PCP - [facility])  Chadwick Troy MD (UROLOGY)    P includes Breast Cancer in her maternal aunt; Breast Cancer (age of onset: 61) in her maternal aunt; Cancer in an other family member; Other in her mother; Pulmonary Disease in an other family member.    SOCIAL HISTORY:   She  reports that she has never smok tenderness   Throat: Lips, mucosa, and tongue normal; teeth and gums normal   Neck: Supple, symmetrical, trachea midline, no adenopathy;  thyroid: not enlarged, symmetric, no tenderness/mass/nodules; no carotid bruit or JVD   Back:   Symmetric, no curvatur mellitus  -     COMP METABOLIC PANEL (14); Future    Urinary frequency    Calcaneal spur of right foot    Tendonitis, Achilles, right    Osteopenia, unspecified location    HTN (hypertension), benign    Pure hypercholesterolemia    1.  Screening for cardiov Friends;Puzzles;Games; Visiting Family      This section provided for quick review of chart, separate sheet to patient  [address] Internal Lab or Procedure External Lab or Procedure   Diabetes Screening      HbgA1C   An Pneumococcal 13 (Prevnar)  Covered Once after 65 No vaccine history found Please get once after your 65th birthday    Pneumococcal 23 (Pneumovax)  Covered Once after 65 No vaccine history found Please get once after your 65th birthday    Hepatitis B for Mo

## 2019-06-10 NOTE — PATIENT INSTRUCTIONS
Pretty Suarez's SCREENING SCHEDULE   Tests on this list are recommended by your physician but may not be covered, or covered at this frequency, by your insurer. Please check with your insurance carrier before scheduling to verify coverage.    Deuce Huang Men who are 73-68 years old and have smoked more than 100 cigarettes in their lifetime   • Anyone with a family history    Colorectal Cancer Screening  Covered up to Age 76     Colonoscopy Screen   Covered every 10 years- more often if abnormal Colonoscopy previous visit.  Please get every year    Pneumococcal 13 (Prevnar)  Covered Once after 65 Orders placed or performed in visit on 06/10/19   • PNEUMOCOCCAL VACC, 13 ISABELLA IM    Please get once after your 65th birthday    Pneumococcal 23 (Pneumovax)  Covered O

## 2019-06-11 RX ORDER — SIMVASTATIN 40 MG
40 TABLET ORAL NIGHTLY
Qty: 90 TABLET | Refills: 0 | Status: SHIPPED | OUTPATIENT
Start: 2019-06-11 | End: 2019-08-02

## 2019-06-12 ENCOUNTER — TELEPHONE (OUTPATIENT)
Dept: FAMILY MEDICINE CLINIC | Facility: CLINIC | Age: 64
End: 2019-06-12

## 2019-06-12 NOTE — TELEPHONE ENCOUNTER
LM for patient that Dr Latrice Jones ordered a Prevnar 13 when she was in on Monday and she left before we could give it. She can call and schedule a nurse visit.

## 2019-06-17 ENCOUNTER — NURSE ONLY (OUTPATIENT)
Dept: FAMILY MEDICINE CLINIC | Facility: CLINIC | Age: 64
End: 2019-06-17
Payer: MEDICARE

## 2019-06-17 PROCEDURE — 90670 PCV13 VACCINE IM: CPT | Performed by: INTERNAL MEDICINE

## 2019-06-17 PROCEDURE — G0009 ADMIN PNEUMOCOCCAL VACCINE: HCPCS | Performed by: INTERNAL MEDICINE

## 2019-07-02 ENCOUNTER — HOSPITAL ENCOUNTER (OUTPATIENT)
Dept: PHYSICAL THERAPY | Facility: HOSPITAL | Age: 64
Setting detail: THERAPIES SERIES
Discharge: HOME | End: 2019-07-02
Attending: INTERNAL MEDICINE
Payer: MEDICARE

## 2019-07-02 DIAGNOSIS — N39.41 URGE INCONTINENCE: ICD-10-CM

## 2019-07-02 PROCEDURE — 97112 NEUROMUSCULAR REEDUCATION: CPT

## 2019-07-02 PROCEDURE — 97110 THERAPEUTIC EXERCISES: CPT

## 2019-07-02 PROCEDURE — 97162 PT EVAL MOD COMPLEX 30 MIN: CPT

## 2019-07-02 NOTE — PROGRESS NOTES
MUSCULOSKELETAL AND PELVIC FLOOR EVALUATION:   Referring Physician: Dr. Zenon Spatz  Diagnosis: Urge Incontinence     Date of Service: 7/2/2019     PATIENT SUMMARY   Fabrice Feliz is a 71year old y/o female who presents to therapy today with complaints of incontinence .  Current impairments include the following: decreased pelvic floor muscles strength and coordination, decreased abdominal strength, fair bladder/bowel habits , nocturia  Alina Cazares would benefit from skilled Physical Therapy to address the above PT Eval Moderate Complexity, Cheli, NM Re-ed      Total Timed Treatment: 60 min     Total Treatment Time: 60 min  In agreement with PFDI score and clinical rationale, this evaluation involved Moderate Complexity decision making due to 1-2 personal factors/co Date____________________    Certification From: 2/0/6748  To:9/30/2019

## 2019-07-10 ENCOUNTER — HOSPITAL ENCOUNTER (OUTPATIENT)
Dept: PHYSICAL THERAPY | Facility: HOSPITAL | Age: 64
Setting detail: THERAPIES SERIES
Discharge: HOME | End: 2019-07-10
Attending: INTERNAL MEDICINE
Payer: MEDICARE

## 2019-07-10 PROCEDURE — 97112 NEUROMUSCULAR REEDUCATION: CPT

## 2019-07-10 PROCEDURE — 97110 THERAPEUTIC EXERCISES: CPT

## 2019-07-10 NOTE — PROGRESS NOTES
Dx: Urge Incontinence          Authorized # of Visits:  Medicare         Next MD visit: none scheduled  Fall Risk: standard         Precautions: n/a             Subjective: Was up 4 times last night and had leakage every time as walked to the bathroom.  Voi Tx#: 8/   PFM NM  Re-ed     Bladder diary    Bladder/bowel habits-HEP ea     Nocturia strategies-  HEP    WENDY and UI strategies         Internal  kegel   NM re-education         Kegel +  iso hip add  10\" x10   Kegel +  Resisted   ER RTB  x10   Kegel +

## 2019-07-12 ENCOUNTER — TELEPHONE (OUTPATIENT)
Dept: FAMILY MEDICINE CLINIC | Facility: CLINIC | Age: 64
End: 2019-07-12

## 2019-07-12 DIAGNOSIS — L98.9 SKIN LESION: Primary | ICD-10-CM

## 2019-07-12 NOTE — TELEPHONE ENCOUNTER
PT NEEDS A CODE TO SEE IF HER DERMATOLOGIST TO FIND OUT IF MEDICARE WILL COVER IT. APPT IS SCHEDULED Friday, 7/19 DR Yuko Pan.  PLEASE CALL

## 2019-07-12 NOTE — TELEPHONE ENCOUNTER
Patient is concerned because her neighbor's friend had some spots on her face removed and medicare denied because it was cosmetic.   Patient states when she was seen 6/10 for physical she discussed spots on her back near bra line that she would like evaluat

## 2019-07-16 ENCOUNTER — HOSPITAL ENCOUNTER (OUTPATIENT)
Dept: PHYSICAL THERAPY | Facility: HOSPITAL | Age: 64
Setting detail: THERAPIES SERIES
Discharge: HOME | End: 2019-07-16
Attending: INTERNAL MEDICINE
Payer: MEDICARE

## 2019-07-16 PROCEDURE — 97110 THERAPEUTIC EXERCISES: CPT

## 2019-07-16 PROCEDURE — 97112 NEUROMUSCULAR REEDUCATION: CPT

## 2019-07-16 NOTE — PROGRESS NOTES
Dx: Urge Incontinence          Authorized # of Visits:  Medicare         Next MD visit: none scheduled  Fall Risk: standard         Precautions: n/a             Subjective: Had a much better week. Had a few nights of only 1 nocturia.  Using calming techniqu interpretation of results.      abdominal bracing        Internal  kegel   NM re-education diaphragmatic breathing         Kegel +  iso hip add  10\" x10   Kegel +  Resisted   ER RTB  x10   Kegel +  U/LE lift  x10     Kegel with plie/ER resisted RTB x10

## 2019-07-23 ENCOUNTER — HOSPITAL ENCOUNTER (OUTPATIENT)
Dept: PHYSICAL THERAPY | Facility: HOSPITAL | Age: 64
Setting detail: THERAPIES SERIES
Discharge: HOME | End: 2019-07-23
Attending: INTERNAL MEDICINE
Payer: MEDICARE

## 2019-07-23 PROCEDURE — 97110 THERAPEUTIC EXERCISES: CPT

## 2019-07-23 PROCEDURE — 97112 NEUROMUSCULAR REEDUCATION: CPT

## 2019-07-23 NOTE — PROGRESS NOTES
Dx: Urge Incontinence          Authorized # of Visits:  Medicare         Next MD visit: none scheduled  Fall Risk: standard         Precautions: n/a             Subjective: Able to calm nervous system much better and able to incorporate breath.  Able to merly activities of daily living . Date: 7/10/2019  Tx#: 2/8 Date: 7/16  Tx#: 3/ Date: 7/23  Tx#: 4/ Date: Tx#: 5/ Date: Tx#: 6/ Date: Tx#: 7/ Date:    Tx#: 8/   PFM NM  Re-ed     Bladder diary    Bladder/bowel habits-HEP ea     Nocturia strategies-  HEP

## 2019-07-30 ENCOUNTER — HOSPITAL ENCOUNTER (OUTPATIENT)
Dept: PHYSICAL THERAPY | Facility: HOSPITAL | Age: 64
Setting detail: THERAPIES SERIES
Discharge: HOME | End: 2019-07-30
Attending: INTERNAL MEDICINE
Payer: MEDICARE

## 2019-07-30 PROCEDURE — 97014 ELECTRIC STIMULATION THERAPY: CPT

## 2019-07-30 PROCEDURE — 97112 NEUROMUSCULAR REEDUCATION: CPT

## 2019-07-30 PROCEDURE — 97110 THERAPEUTIC EXERCISES: CPT

## 2019-07-30 NOTE — PROGRESS NOTES
Dx: Urge Incontinence          Authorized # of Visits:  Medicare         Next MD visit: none scheduled  Fall Risk: standard         Precautions: n/a             Subjective: Had good and bad days this past week.  Was able to stop urine flow with pelvic floor 7/30  Tx#: 5/ Date: Tx#: 6/ Date: Tx#: 7/ Date:    Tx#: 8/   PFM NM  Re-ed     Bladder diary    Bladder/bowel habits-HEP ea     Nocturia strategies-  HEP    WENDY and UI strategies PFM NM  Re-ed     Inverse relationship bladder/pelvic floor muscles     ph min    IFC-lumbosacral , high, target  10 min  prone          Charges: TEx2 (28 min) , NM Re-edx1 (15 min), ES (10 min)   Total Timed Treatment: 53 min     Total Treatment Time: 53 min

## 2019-08-02 ENCOUNTER — TELEPHONE (OUTPATIENT)
Dept: FAMILY MEDICINE CLINIC | Facility: CLINIC | Age: 64
End: 2019-08-02

## 2019-08-02 RX ORDER — SIMVASTATIN 40 MG
40 TABLET ORAL NIGHTLY
Qty: 10 TABLET | Refills: 0 | Status: SHIPPED | OUTPATIENT
Start: 2019-08-02 | End: 2019-09-03

## 2019-08-02 NOTE — TELEPHONE ENCOUNTER
Per protocol ok to refill. #10 tabs sent to pharmacy in [facility] as patient is out of town and left prescription at  Home. Patient notified. Patient verbalized understanding.        Rick OCONNOR, 08/02/19, 8:59 AM

## 2019-08-02 NOTE — TELEPHONE ENCOUNTER
simvastatin (ZOCOR) 40 MG Oral Tab   [facility]  PH: [phone]  CAN YOU PLEASE SEND LIKE 10 TABS FOR HER?   SHE FORGOT HER MEDS AT HOME

## 2019-08-13 ENCOUNTER — HOSPITAL ENCOUNTER (OUTPATIENT)
Dept: PHYSICAL THERAPY | Facility: HOSPITAL | Age: 64
Setting detail: THERAPIES SERIES
Discharge: HOME | End: 2019-08-13
Attending: INTERNAL MEDICINE
Payer: MEDICARE

## 2019-08-13 PROCEDURE — 97110 THERAPEUTIC EXERCISES: CPT

## 2019-08-13 PROCEDURE — 97112 NEUROMUSCULAR REEDUCATION: CPT

## 2019-08-13 NOTE — PROGRESS NOTES
Dx: Urge Incontinence          Authorized # of Visits:  Medicare         Next MD visit: none scheduled  Fall Risk: standard         Precautions: n/a             Subjective: Was in the pool over the past week which helped to increase her strength.  Typically strategies-  HEP    WENDY and UI strategies PFM NM  Re-ed     Inverse relationship bladder/pelvic floor muscles     physiological quieting     Canister analogy    WENDY and UI strategies    biofeedback -    internal sensor was placed and leads were attached  R -                      - - Standing-  Kegel + iso hip add + squat w ball behind back x20     Biodex balance system-  Weight shift R/L w UE, high skill level,   Platform 10-   x 3 min    IFC-lumbosacral , high, target  10 min  prone -         Charges: TEx1

## 2019-08-20 ENCOUNTER — HOSPITAL ENCOUNTER (OUTPATIENT)
Dept: PHYSICAL THERAPY | Facility: HOSPITAL | Age: 64
Setting detail: THERAPIES SERIES
Discharge: HOME | End: 2019-08-20
Attending: INTERNAL MEDICINE
Payer: MEDICARE

## 2019-08-20 PROCEDURE — 97112 NEUROMUSCULAR REEDUCATION: CPT

## 2019-08-20 PROCEDURE — 97110 THERAPEUTIC EXERCISES: CPT

## 2019-08-20 PROCEDURE — 97014 ELECTRIC STIMULATION THERAPY: CPT

## 2019-08-20 NOTE — PROGRESS NOTES
Dx: Urge Incontinence          Authorized # of Visits:  Medicare         Next MD visit: none scheduled  Fall Risk: standard         Precautions: n/a             Subjective: Doing better with urge deferring.  ES helped last visit, but didn't bring internal s physiological quieting     Canister analogy    WENDY and UI strategies    biofeedback -    internal sensor was placed and leads were attached  Resting tone  Tonic  Phasic  [place]  with coordinated breathing       Biofeedback with constant interpretation lift  x10     Kegel with plie/ER resisted RTB x10     HEP with RTB issued for above       -                      - - Standing-  Kegel + iso hip add + squat w ball behind back x20     Biodex balance system-  Weight shift R/L w UE, high skill level,   Platfo

## 2019-08-26 ENCOUNTER — APPOINTMENT (OUTPATIENT)
Dept: GENERAL RADIOLOGY | Age: 64
End: 2019-08-26
Attending: FAMILY MEDICINE
Payer: MEDICARE

## 2019-08-26 ENCOUNTER — HOSPITAL ENCOUNTER (OUTPATIENT)
Age: 64
Discharge: HOME | End: 2019-08-26
Attending: FAMILY MEDICINE
Payer: MEDICARE

## 2019-08-26 DIAGNOSIS — M65.9 FLEXOR TENOSYNOVITIS OF THUMB: ICD-10-CM

## 2019-08-26 DIAGNOSIS — S63.642A SPRAIN OF METACARPOPHALANGEAL (MCP) JOINT OF LEFT THUMB, INITIAL ENCOUNTER: Primary | ICD-10-CM

## 2019-08-26 PROCEDURE — 99204 OFFICE O/P NEW MOD 45 MIN: CPT

## 2019-08-26 PROCEDURE — 73140 X-RAY EXAM OF FINGER(S): CPT | Performed by: FAMILY MEDICINE

## 2019-08-26 PROCEDURE — 99213 OFFICE O/P EST LOW 20 MIN: CPT

## 2019-08-26 RX ORDER — METHYLPREDNISOLONE 4 MG/1
TABLET ORAL
Qty: 21 TABLET | Refills: 0 | Status: SHIPPED | OUTPATIENT
Start: 2019-08-26 | End: 2019-09-26 | Stop reason: ALTCHOICE

## 2019-08-26 NOTE — ED INITIAL ASSESSMENT (HPI)
Pt sts jammed left thumb 8/10/2019 when dog jumped up on her. Pain continues. Bumped thumb several times over the weekend. Works as a massage therapist. No hx of fracture to thumb. Pt is left handed.

## 2019-08-26 NOTE — ED PROVIDER NOTES
Patient Seen in: [id number] [facility]    History   Patient presents with:  Upper Extremity Injury (musculoskeletal)    Stated Complaint: left thumb pain    HPI    *70-year-old female presents to the immediate care today with chief complaints thumb pain  Other systems are as noted in HPI. Constitutional and vital signs reviewed. All other systems reviewed and negative except as noted above.     Physical Exam     ED Triage Vitals   BP    Pulse    Resp    Temp    Temp src    SpO2    O2 Devic the left hand. No acute fracture or dislocation is seen. If clinical symptoms persist recommend followup radiographs or MRI.     Dictated by: Janelle Mijares MD on 8/26/2019 at 10:31     Approved by: Janelle Mijares MD             [facility]       Left thumb: No fracture n

## 2019-08-27 ENCOUNTER — HOSPITAL ENCOUNTER (OUTPATIENT)
Dept: PHYSICAL THERAPY | Facility: HOSPITAL | Age: 64
Setting detail: THERAPIES SERIES
Discharge: HOME | End: 2019-08-27
Attending: INTERNAL MEDICINE
Payer: MEDICARE

## 2019-08-27 PROCEDURE — 97110 THERAPEUTIC EXERCISES: CPT

## 2019-08-27 PROCEDURE — 97112 NEUROMUSCULAR REEDUCATION: CPT

## 2019-08-27 PROCEDURE — 97014 ELECTRIC STIMULATION THERAPY: CPT

## 2019-08-27 NOTE — PROGRESS NOTES
Progress Summary  Pt has attended 8 visits in Physical Therapy.     Dx: Urge Incontinence          Authorized # of Visits:  Medicare         Next MD visit: none scheduled  Fall Risk: standard         Precautions: n/a             Subjective: Has noticed a b none  Pain: Negative  Spasm: Negative     Voluntary contraction: moderate  Voluntary relaxation: moderate     Pelvic Floor Muscle strength: (PERF= Power/Endurance/Reps/Fast)MMT: 3/5/4/7  Accessory Muscle Use: abdominals  Internal Summary: decreased pelvic [phone]. I certify the need for these services furnished under this plan of treatment and while under my care.     X___________________________________________________ Date____________________    Certification From: 7/64/4569  To:11/25/2019    Date: tone  Tonic  Phasic    w isometric hip add         PFM NM  Re-ed     urge incontinence strategies    Graded exercises with bladder retraining strategies                IFC-lumbosacral , high, target  10 min  Prone w MHP Formal re-assessment    Reviewed HEP

## 2019-09-03 RX ORDER — SIMVASTATIN 40 MG
TABLET ORAL
Qty: 90 TABLET | Refills: 0 | Status: SHIPPED | OUTPATIENT
Start: 2019-09-03 | End: 2019-10-25 | Stop reason: ALTCHOICE

## 2019-09-03 NOTE — TELEPHONE ENCOUNTER
Last office visit 7-19-19  Last refill 8-2-19 #10; 6-11-19 #90  Labs done 6-10-19. Due for repeat.   Future Appointments   Date Time Provider Karl Faith   9/9/2019 12:45 PM Soo King, PT [facility] [address]   9/23/2019  3:15 PM Earleen Signs

## 2019-09-09 ENCOUNTER — HOSPITAL ENCOUNTER (OUTPATIENT)
Dept: PHYSICAL THERAPY | Facility: HOSPITAL | Age: 64
Setting detail: THERAPIES SERIES
Discharge: HOME | End: 2019-09-09
Attending: INTERNAL MEDICINE
Payer: MEDICARE

## 2019-09-09 PROCEDURE — 97112 NEUROMUSCULAR REEDUCATION: CPT

## 2019-09-09 PROCEDURE — 97032 APPL MODALITY 1+ESTIM EA 15: CPT

## 2019-09-09 PROCEDURE — 97110 THERAPEUTIC EXERCISES: CPT

## 2019-09-09 NOTE — PROGRESS NOTES
Progress Summary  Pt has attended 8 visits in Physical Therapy.     Dx: Urge Incontinence          Authorized # of Visits:  Medicare -8+6        Next MD visit: none scheduled  Fall Risk: standard         Precautions: n/a             Subjective: Had a lot o improved understanding of bladder/bowel health, bladder retraining and long-term management. -PROGRESSING    2. Patient will demonstrate improved bladder voiding habits to voiding every 2 hours with less uinary leakage.- PROGRESSING  3.  Patient will demonst pelvic brace + iso hip add + bridge x3-hamstring cramping    Kegel + abdominal bracing   U/LE lift  x10         Kegel + abdominal bracing + clamshell x20 R/L RTB        PFM NM  Re-ed     stress urinary incontinence and urge incontinence strategies    t -                      - - Standing-  Kegel + iso hip add + squat w ball behind back x20     Biodex balance system-  Weight shift R/L w UE, high skill level,   Platform 10-   x 3 min    IFC-lumbosacral , high, target  10 min  prone - diaphragmatic breathin

## 2019-09-23 ENCOUNTER — HOSPITAL ENCOUNTER (OUTPATIENT)
Dept: PHYSICAL THERAPY | Facility: HOSPITAL | Age: 64
Setting detail: THERAPIES SERIES
Discharge: HOME | End: 2019-09-23
Attending: INTERNAL MEDICINE
Payer: MEDICARE

## 2019-09-23 PROCEDURE — 97112 NEUROMUSCULAR REEDUCATION: CPT

## 2019-09-23 PROCEDURE — 97110 THERAPEUTIC EXERCISES: CPT

## 2019-09-23 PROCEDURE — 97140 MANUAL THERAPY 1/> REGIONS: CPT

## 2019-09-23 NOTE — PROGRESS NOTES
Dx: Urge Incontinence          Authorized # of Visits:  Medicare -8+6        Next MD visit: none scheduled  Fall Risk: standard         Precautions: n/a             Subjective: Taking water aerobics and has been able to get through class without leakage.  Coleman Draper fair, baseline between contractions flatline with verbal cueing, baseline resting average flatlineUa (normal 2.0Ua)           PLAN OF CARE:    Goals: To be met in 8 visits   1.  Independent in HEP and progression with improved understanding of bladder/terri strategies    Cause and effect w pelvic floor muscles and activities of daily living            abdominal bracing -instruction    abdominal bracing 10 sec x10    kegel + abdominal bracing with coordinated breathing   pelvic brace -handout with instructions Sit on ball-  UE OH B, alt x10    Reverse kegels w 10 sec relaxation  x15    kegel 10 sec hold x15              Internal  kegel   NM re-education diaphragmatic breathing  diaphragmatic breathing  Shuttle- DLP  25# with pel

## 2019-09-26 ENCOUNTER — OFFICE VISIT (OUTPATIENT)
Dept: FAMILY MEDICINE CLINIC | Facility: CLINIC | Age: 64
End: 2019-09-26
Payer: MEDICARE

## 2019-09-26 ENCOUNTER — APPOINTMENT (OUTPATIENT)
Dept: LAB | Age: 64
End: 2019-09-26
Attending: INTERNAL MEDICINE
Payer: MEDICARE

## 2019-09-26 VITALS
RESPIRATION RATE: 12 BRPM | SYSTOLIC BLOOD PRESSURE: 150 MMHG | TEMPERATURE: 98 F | BODY MASS INDEX: 32.33 KG/M2 | HEIGHT: 67 IN | HEART RATE: 64 BPM | DIASTOLIC BLOOD PRESSURE: 82 MMHG | WEIGHT: 206 LBS

## 2019-09-26 DIAGNOSIS — E78.00 HYPERCHOLESTEREMIA: Primary | ICD-10-CM

## 2019-09-26 DIAGNOSIS — R07.81 RIB PAIN ON LEFT SIDE: ICD-10-CM

## 2019-09-26 DIAGNOSIS — E78.00 HYPERCHOLESTEREMIA: ICD-10-CM

## 2019-09-26 LAB
CHOLEST SMN-MCNC: 177 MG/DL (ref ?–200)
HDLC SERPL-MCNC: 60 MG/DL (ref 40–59)
LDLC SERPL CALC-MCNC: 91 MG/DL (ref ?–100)
NONHDLC SERPL-MCNC: 117 MG/DL (ref ?–130)
TRIGL SERPL-MCNC: 128 MG/DL (ref 30–149)
VLDLC SERPL CALC-MCNC: 26 MG/DL (ref 0–30)

## 2019-09-26 PROCEDURE — 99213 OFFICE O/P EST LOW 20 MIN: CPT | Performed by: INTERNAL MEDICINE

## 2019-09-26 PROCEDURE — 36415 COLL VENOUS BLD VENIPUNCTURE: CPT

## 2019-09-26 PROCEDURE — 80061 LIPID PANEL: CPT

## 2019-09-26 NOTE — PROGRESS NOTES
Isadore Dakin is a 71year old female. HPI:   High cholesterol; she has been taking ZOCOR, but she thinks it makes her back hurt in the morning. Here for recheck. She has been going to water aerobics with her friend and trying to eat \"clean\". RRR without murmur  GI: good BS's,no masses, HSM or tenderness  EXTREMITIES: no cyanosis, clubbing or edema    ASSESSMENT AND PLAN:     Hypercholesteremia  (primary encounter diagnosis)  Rib pain on left side  Needs recheck, really wants to be on red rice

## 2019-09-27 DIAGNOSIS — E78.00 HYPERCHOLESTEREMIA: Primary | ICD-10-CM

## 2019-10-07 ENCOUNTER — APPOINTMENT (OUTPATIENT)
Dept: PHYSICAL THERAPY | Facility: HOSPITAL | Age: 64
End: 2019-10-07
Attending: INTERNAL MEDICINE
Payer: MEDICARE

## 2019-10-14 ENCOUNTER — TELEPHONE (OUTPATIENT)
Dept: PHYSICAL THERAPY | Facility: HOSPITAL | Age: 64
End: 2019-10-14

## 2019-10-14 ENCOUNTER — IMMUNIZATION (OUTPATIENT)
Dept: FAMILY MEDICINE CLINIC | Facility: CLINIC | Age: 64
End: 2019-10-14
Payer: MEDICARE

## 2019-10-14 DIAGNOSIS — Z23 NEED FOR VACCINATION: ICD-10-CM

## 2019-10-14 PROCEDURE — 90662 IIV NO PRSV INCREASED AG IM: CPT | Performed by: INTERNAL MEDICINE

## 2019-10-14 PROCEDURE — G0008 ADMIN INFLUENZA VIRUS VAC: HCPCS | Performed by: INTERNAL MEDICINE

## 2019-10-21 ENCOUNTER — APPOINTMENT (OUTPATIENT)
Dept: PHYSICAL THERAPY | Facility: HOSPITAL | Age: 64
End: 2019-10-21
Attending: INTERNAL MEDICINE
Payer: MEDICARE

## 2019-10-25 ENCOUNTER — OFFICE VISIT (OUTPATIENT)
Dept: FAMILY MEDICINE CLINIC | Facility: CLINIC | Age: 64
End: 2019-10-25
Payer: MEDICARE

## 2019-10-25 ENCOUNTER — TELEPHONE (OUTPATIENT)
Dept: FAMILY MEDICINE CLINIC | Facility: CLINIC | Age: 64
End: 2019-10-25

## 2019-10-25 VITALS
OXYGEN SATURATION: 99 % | WEIGHT: 204 LBS | TEMPERATURE: 99 F | HEIGHT: 67 IN | RESPIRATION RATE: 18 BRPM | DIASTOLIC BLOOD PRESSURE: 80 MMHG | HEART RATE: 78 BPM | SYSTOLIC BLOOD PRESSURE: 140 MMHG | BODY MASS INDEX: 32.02 KG/M2

## 2019-10-25 DIAGNOSIS — R10.31 RLQ ABDOMINAL PAIN: Primary | ICD-10-CM

## 2019-10-25 DIAGNOSIS — R50.9 FEVER, UNSPECIFIED FEVER CAUSE: ICD-10-CM

## 2019-10-25 DIAGNOSIS — Z87.19 HISTORY OF DIVERTICULITIS: ICD-10-CM

## 2019-10-25 PROBLEM — K57.92 DIVERTICULITIS: Status: ACTIVE | Noted: 2018-07-16

## 2019-10-25 PROCEDURE — 81003 URINALYSIS AUTO W/O SCOPE: CPT | Performed by: NURSE PRACTITIONER

## 2019-10-25 PROCEDURE — 87086 URINE CULTURE/COLONY COUNT: CPT | Performed by: NURSE PRACTITIONER

## 2019-10-25 PROCEDURE — 99214 OFFICE O/P EST MOD 30 MIN: CPT | Performed by: NURSE PRACTITIONER

## 2019-10-25 RX ORDER — LEVOFLOXACIN 500 MG/1
500 TABLET, FILM COATED ORAL DAILY
Qty: 10 TABLET | Refills: 0 | Status: SHIPPED | OUTPATIENT
Start: 2019-10-25 | End: 2019-11-04

## 2019-10-25 RX ORDER — METRONIDAZOLE 500 MG/1
500 TABLET ORAL 3 TIMES DAILY
Qty: 30 TABLET | Refills: 0 | Status: SHIPPED | OUTPATIENT
Start: 2019-10-25 | End: 2020-06-25 | Stop reason: ALTCHOICE

## 2019-10-25 NOTE — PROGRESS NOTES
HPI:   Abdominal Pain   This is a new (whole belly) problem. The current episode started yesterday. The problem occurs constantly. The problem has been gradually worsening. The pain is located in the LLQ and RLQ. The pain is at a severity of 5/10.  The qu Constitutional: Positive for chills, fatigue and fever. Respiratory: Negative for cough and shortness of breath. Cardiovascular: Negative for chest pain and palpitations. Gastrointestinal: Positive for abdominal pain and abdominal distention.  Deisy Fisher surgical consultation.    2.  Appendix is unremarkable.    3.  Punctate nonobstructing left renal calculus.    4.  Left lower lobe 4 mm groundglass nodule.  If patient is high risk for lung cancer, consider follow-up CT chest in 12 months.    5.  Additiona

## 2019-10-25 NOTE — TELEPHONE ENCOUNTER
Advised that patient should be evaluated today by the DUNCAN Recinos. Patient scheduled appointment for this morning.

## 2019-10-25 NOTE — TELEPHONE ENCOUNTER
----- [facility] sent at 10/25/2019 10:14 AM CDT -----  Patient returning Dana's call

## 2019-10-31 ENCOUNTER — OFFICE VISIT (OUTPATIENT)
Dept: FAMILY MEDICINE CLINIC | Facility: CLINIC | Age: 64
End: 2019-10-31
Payer: MEDICARE

## 2019-10-31 VITALS
WEIGHT: 206 LBS | DIASTOLIC BLOOD PRESSURE: 60 MMHG | BODY MASS INDEX: 32 KG/M2 | SYSTOLIC BLOOD PRESSURE: 138 MMHG | TEMPERATURE: 99 F | HEART RATE: 80 BPM | RESPIRATION RATE: 18 BRPM

## 2019-10-31 DIAGNOSIS — K57.92 DIVERTICULITIS: Primary | ICD-10-CM

## 2019-10-31 DIAGNOSIS — H61.21 IMPACTED CERUMEN OF RIGHT EAR: ICD-10-CM

## 2019-10-31 PROCEDURE — 99214 OFFICE O/P EST MOD 30 MIN: CPT | Performed by: INTERNAL MEDICINE

## 2019-10-31 NOTE — PROGRESS NOTES
Mila Hernández is a 71year old female. HPI:   Pt has pain in the RLQ and saw NP, placed on Flagyl and Levaquin. Eating bland foods. Stomach started feeling better Monday 10/28/2019. This is her second episode in 6 months.   She had a colonscopy in 20 no apparent distress  SKIN: no rashes,no suspicious lesions  HEENT: atraumatic, normocephalic,ears and throat are clear  NECK: supple,no adenopathy,no bruits  LUNGS: clear to auscultation  CARDIO: RRR without murmur  GI: good BS's,no masses, HSM or tendern

## 2019-11-18 ENCOUNTER — HOSPITAL ENCOUNTER (OUTPATIENT)
Dept: PHYSICAL THERAPY | Facility: HOSPITAL | Age: 64
Setting detail: THERAPIES SERIES
Discharge: HOME | End: 2019-11-18
Attending: INTERNAL MEDICINE
Payer: MEDICARE

## 2019-11-18 PROCEDURE — 97112 NEUROMUSCULAR REEDUCATION: CPT

## 2019-11-18 PROCEDURE — 97110 THERAPEUTIC EXERCISES: CPT

## 2019-11-18 NOTE — PROGRESS NOTES
Discharge Summary  Pt has attended 11 visits in Physical Therapy.    Dx: Urge Incontinence          Authorized # of Visits:  Medicare -8+6        Next MD visit: none scheduled  Fall Risk: standard         Precautions: n/a             Subjective: Still taki Voluntary contraction: moderate  Voluntary relaxation: moderate     Pelvic Floor Muscle strength: (PERF= Power/Endurance/Reps/Fast)MMT: 4/9/9/8 (WAS:3/5/4/7)  Accessory Muscle Use: ABLE TO ISOLATE PELVIC FLOOR MUSCLES NOW (WAS:abdominals)  Internal Summa relationship bladder/pelvic floor muscles     physiological quieting     Canister analogy    WENDY and UI strategies    biofeedback -    internal sensor was placed and leads were attached  Resting tone  Tonic  Phasic  [place]  with coordinated breathing STM-internal 15hz 10 on/off x10 min    biofeedback internal sensor was placed and leads were attached  Resting tone  Tonic  Phasic    w   Kegel   +  iso hip add  10\"    Kegel +  Resisted   ER RTB  2\"           Biofeedback with constant interpretation of

## 2019-11-26 ENCOUNTER — TELEPHONE (OUTPATIENT)
Dept: FAMILY MEDICINE CLINIC | Facility: CLINIC | Age: 64
End: 2019-11-26

## 2019-11-26 RX ORDER — ONDANSETRON 4 MG/1
4 TABLET, FILM COATED ORAL EVERY 8 HOURS PRN
Qty: 20 TABLET | Refills: 2 | Status: CANCELLED | OUTPATIENT
Start: 2019-11-26

## 2019-11-26 NOTE — TELEPHONE ENCOUNTER
Patient said that she started getting sick this morning. She said she vomited once and has only had clear liquids today and that went \"right through her. \". She said she is not nauseated but has the stomach cramps.  She said she was concerned because she h

## 2019-11-26 NOTE — TELEPHONE ENCOUNTER
Pt was advised to drink small amounts of liquids every few hours. Pt does not want the Zofran as she is not nauseous.  Advised Pepto-bismol and cautioned pt that stool and tongue may turn black, which is normal. Gave pt signs of dehydration and to go to [employer]

## 2019-11-26 NOTE — TELEPHONE ENCOUNTER
Pt cannot keep anything in her, watery diarrhea, does not think she can make it in for a appt, call pt.

## 2019-12-17 ENCOUNTER — MED REC SCAN ONLY (OUTPATIENT)
Dept: FAMILY MEDICINE CLINIC | Facility: CLINIC | Age: 64
End: 2019-12-17

## 2020-02-17 ENCOUNTER — TELEPHONE (OUTPATIENT)
Dept: FAMILY MEDICINE CLINIC | Facility: CLINIC | Age: 65
End: 2020-02-17

## 2020-02-17 NOTE — TELEPHONE ENCOUNTER
Fever and cough, this is how you spread it, has to be pretty intimate contact. 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

## 2020-02-17 NOTE — TELEPHONE ENCOUNTER
Patient said she does not have any flu symptoms. She said she was with her Fransico Brittle all last week. He tested positive for Influenza B on Friday and she was last with him on Saturday night. Does Dr Tiesha Edge think she should take Tamiflu?  She said she has a bus

## 2020-02-17 NOTE — TELEPHONE ENCOUNTER
WAS WITH HER GRANDSON ALL WEEK, HE TESTED POSITIVE FOR INFLUENZA B, SHOULD SHE GET TESTED, WHAT IS INCUBATION TIME, HAS SOME QUESTIONS, CALL PT

## 2020-06-08 ENCOUNTER — TELEPHONE (OUTPATIENT)
Dept: FAMILY MEDICINE CLINIC | Facility: CLINIC | Age: 65
End: 2020-06-08

## 2020-06-08 DIAGNOSIS — Z13.820 SCREENING FOR OSTEOPOROSIS: ICD-10-CM

## 2020-06-08 DIAGNOSIS — Z78.0 POSTMENOPAUSAL: ICD-10-CM

## 2020-06-08 DIAGNOSIS — E78.00 HYPERCHOLESTEREMIA: ICD-10-CM

## 2020-06-08 DIAGNOSIS — Z12.31 BREAST CANCER SCREENING BY MAMMOGRAM: Primary | ICD-10-CM

## 2020-06-08 DIAGNOSIS — I10 HTN (HYPERTENSION), BENIGN: ICD-10-CM

## 2020-06-08 NOTE — TELEPHONE ENCOUNTER
Future Appointments   Date Time Provider Karl Faith   6/25/2020  9:00 AM Ke Patton MD [facility] [place]   7/17/2020  9:30 AM [facility] and advised that orders are in the system

## 2020-06-08 NOTE — TELEPHONE ENCOUNTER
PLACE ORDER FOR MAMMO & BONE SCAN SO SHE CAN HAVE DONE @ [facility]--IF THEY CAN BE DONE THERE--ALSO SHE HAS WELLNESS APPT HERE 6/25, CAN DR BROWNLEE PUT LAB ORDERS IN SO PT CAN GET LABS DONE PRIOR TO WELLNESS?

## 2020-06-08 NOTE — TELEPHONE ENCOUNTER
Mammogram and Dexa pended. Needs lab orders then we can call her to schedule lab appt prior to her appt.

## 2020-06-10 ENCOUNTER — HOSPITAL ENCOUNTER (OUTPATIENT)
Dept: BONE DENSITY | Age: 65
Discharge: HOME | End: 2020-06-10
Attending: INTERNAL MEDICINE
Payer: MEDICARE

## 2020-06-10 ENCOUNTER — HOSPITAL ENCOUNTER (OUTPATIENT)
Dept: MAMMOGRAPHY | Age: 65
Discharge: HOME | End: 2020-06-10
Attending: INTERNAL MEDICINE
Payer: MEDICARE

## 2020-06-10 DIAGNOSIS — Z78.0 POSTMENOPAUSAL: ICD-10-CM

## 2020-06-10 DIAGNOSIS — Z12.31 BREAST CANCER SCREENING BY MAMMOGRAM: ICD-10-CM

## 2020-06-10 PROCEDURE — 77080 DXA BONE DENSITY AXIAL: CPT | Performed by: INTERNAL MEDICINE

## 2020-06-10 PROCEDURE — 77063 BREAST TOMOSYNTHESIS BI: CPT | Performed by: INTERNAL MEDICINE

## 2020-06-10 PROCEDURE — 77067 SCR MAMMO BI INCL CAD: CPT | Performed by: INTERNAL MEDICINE

## 2020-06-22 ENCOUNTER — LAB ENCOUNTER (OUTPATIENT)
Dept: LAB | Age: 65
End: 2020-06-22
Attending: INTERNAL MEDICINE
Payer: MEDICARE

## 2020-06-22 DIAGNOSIS — E78.00 HYPERCHOLESTEREMIA: ICD-10-CM

## 2020-06-22 DIAGNOSIS — I10 HTN (HYPERTENSION), BENIGN: ICD-10-CM

## 2020-06-22 PROCEDURE — 80053 COMPREHEN METABOLIC PANEL: CPT

## 2020-06-22 PROCEDURE — 80061 LIPID PANEL: CPT

## 2020-06-22 PROCEDURE — 36415 COLL VENOUS BLD VENIPUNCTURE: CPT

## 2020-06-22 PROCEDURE — 85025 COMPLETE CBC W/AUTO DIFF WBC: CPT

## 2020-06-25 ENCOUNTER — OFFICE VISIT (OUTPATIENT)
Dept: FAMILY MEDICINE CLINIC | Facility: CLINIC | Age: 65
End: 2020-06-25
Payer: MEDICARE

## 2020-06-25 VITALS
WEIGHT: 213.25 LBS | SYSTOLIC BLOOD PRESSURE: 150 MMHG | HEART RATE: 71 BPM | BODY MASS INDEX: 33 KG/M2 | DIASTOLIC BLOOD PRESSURE: 80 MMHG | TEMPERATURE: 98 F | OXYGEN SATURATION: 97 %

## 2020-06-25 DIAGNOSIS — Z00.00 ENCOUNTER FOR ANNUAL HEALTH EXAMINATION: Primary | ICD-10-CM

## 2020-06-25 DIAGNOSIS — E78.00 HYPERCHOLESTEREMIA: ICD-10-CM

## 2020-06-25 DIAGNOSIS — R63.5 WEIGHT GAIN: ICD-10-CM

## 2020-06-25 DIAGNOSIS — I10 HTN (HYPERTENSION), BENIGN: ICD-10-CM

## 2020-06-25 PROCEDURE — G0009 ADMIN PNEUMOCOCCAL VACCINE: HCPCS | Performed by: INTERNAL MEDICINE

## 2020-06-25 PROCEDURE — G0439 PPPS, SUBSEQ VISIT: HCPCS | Performed by: INTERNAL MEDICINE

## 2020-06-25 PROCEDURE — 90732 PPSV23 VACC 2 YRS+ SUBQ/IM: CPT | Performed by: INTERNAL MEDICINE

## 2020-06-25 RX ORDER — FAMOTIDINE 40 MG/1
TABLET, FILM COATED ORAL
COMMUNITY
Start: 2020-05-25

## 2020-06-25 NOTE — PROGRESS NOTES
HPI:   Jorge A Mcrae is a 76year old female who presents for a Medicare Subsequent Annual Wellness visit (Pt already had Initial Annual Wellness).     Pt has been back to work she had gained some weight and lost some exercise tolerance over the [place] hypercholesterolemia     Other psoriasis     Allergic rhinitis, cause unspecified     Other urinary incontinence     Herpes simplex virus type 1 (HSV-1) dermatitis     Diverticulitis    Wt Readings from Last 3 Encounters:  06/25/20 : 213 lb 4 oz (96.7 kg) OF SYSTEMS:        EXAM:   /80 (BP Location: Right arm, Patient Position: Sitting, Cuff Size: large)   Pulse 71   Temp 97.5 °F (36.4 °C) (Temporal)   Wt 213 lb 4 oz (96.7 kg)   SpO2 97%   BMI 33.40 kg/m²  Estimated body mass index is 33.4 kg/m² as ca YRS & Older PRSV Free (51977) 10/14/2019   • FLULAVAL 6 months & older 0.5 ml Prefilled syringe (91207) 10/30/2018   • Fluvirin, 3 Years & >, Im 12/04/2012   • Influenza 09/26/2011, 10/31/2015   • Pneumococcal (Prevnar 13) 06/17/2019   • Pneumovax 23 06/25 (mg/dL)   Date Value   12/30/2013 97          Cardiovascular Disease Screening     LDL Annually LDL Cholesterol (mg/dL)   Date Value   06/22/2020 192 (H)     LDL CHOLESTROL (mg/dL)   Date Value   12/30/2013 109        EKG - w/ Initial Preventative Physical institutions for the mentally retarded   Persons who live in the same house as a HepB virus carrier   Homosexual men   Illicit injectable drug abusers     Tetanus Toxoid  Only covered with a cut with metal- TD and TDaP Not covered by Medicare Part B No vac

## 2020-06-25 NOTE — PATIENT INSTRUCTIONS
Vivien Suarez's SCREENING SCHEDULE   Tests on this list are recommended by your physician but may not be covered, or covered at this frequency, by your insurer. Please check with your insurance carrier before scheduling to verify coverage.    Isi Decree who are 73-68 years old and have smoked more than 100 cigarettes in their lifetime   • Anyone with a family history    Colorectal Cancer Screening  Covered up to Age 76     Colonoscopy Screen   Covered every 10 years- more often if abnormal Colonoscopy due on 10/14/19   • FLU VACC HIGH DOSE PRSV FREE    Please get every year    Pneumococcal 13 (Prevnar)  Covered Once after 65 Orders placed or performed in visit on 06/10/19   • PNEUMOCOCCAL VACC, 13 ISABELLA IM    Please get once after your 65th birthday    Livia Reeves

## 2020-07-20 ENCOUNTER — OFFICE VISIT (OUTPATIENT)
Dept: FAMILY MEDICINE CLINIC | Facility: CLINIC | Age: 65
End: 2020-07-20
Payer: MEDICARE

## 2020-07-20 VITALS
SYSTOLIC BLOOD PRESSURE: 136 MMHG | RESPIRATION RATE: 16 BRPM | HEART RATE: 70 BPM | HEIGHT: 67 IN | DIASTOLIC BLOOD PRESSURE: 88 MMHG | BODY MASS INDEX: 33.46 KG/M2 | WEIGHT: 213.19 LBS | OXYGEN SATURATION: 96 % | TEMPERATURE: 99 F

## 2020-07-20 DIAGNOSIS — G47.19 EXCESSIVE DAYTIME SLEEPINESS: Primary | ICD-10-CM

## 2020-07-20 DIAGNOSIS — R06.83 SNORING: ICD-10-CM

## 2020-07-20 DIAGNOSIS — R35.1 NOCTURIA: ICD-10-CM

## 2020-07-20 PROCEDURE — 99214 OFFICE O/P EST MOD 30 MIN: CPT | Performed by: FAMILY MEDICINE

## 2020-07-20 RX ORDER — CHOLECALCIFEROL (VITAMIN D3) 1250 MCG
5000 CAPSULE ORAL DAILY
COMMUNITY

## 2020-07-20 RX ORDER — FEXOFENADINE HYDROCHLORIDE 60 MG/1
60 TABLET, FILM COATED ORAL DAILY
COMMUNITY

## 2020-07-20 RX ORDER — CHLORAL HYDRATE 500 MG
1000 CAPSULE ORAL DAILY
COMMUNITY

## 2020-07-20 NOTE — PROGRESS NOTES
[facility] [place]  SLEEP PROGRESS NOTE        HPI:   This is a 76year old female coming in for Patient presents with:  Sleep Problem      HPI:   She snores rather loudly at night. She has woken herself up before.  She has had occasions where found for: B12, VITB12      Past Medical History:   Diagnosis Date   • Abdominal pain, unspecified site 07/07/2011   • Acute sinusitis, unspecified 08/20/2011   • Allergic rhinitis, cause unspecified 3/29/2010   • Bronchitis, not specified as acute or  HCl 60 MG Oral Tab Take 60 mg by mouth daily.      • famotidine 40 MG Oral Tab UPDATE SIG AND QTY        Counseling given: Not Answered  Comment: Current Non smoker          Problem List:  Patient Active Problem List:     Pure hypercholesterolemia     Other well-nourished. No distress. HENT:   Head: Normocephalic and atraumatic. Right Ear: External ear normal.   Left Ear: External ear normal.   Nose: Nose normal.   Mouth/Throat: Oropharynx is clear and moist. No oropharyngeal exudate. Mal 1 tonsils 0. verbalizes understanding. Parent is notified to call with any questions, complications, allergies, or worsening or changing symptoms. Parent is to call with any side effects or complications from the treatments as a result of today.      \" This note was c

## 2020-07-20 NOTE — PATIENT INSTRUCTIONS
You have been scheduled for a sleep study at the [address] [place] [address]. Please  Call the sleep center at  to review forms and receive forms to fill out in the mail. The Sleep Center will pre-certify your sleep study with your insurance.

## 2020-07-24 ENCOUNTER — HOSPITAL ENCOUNTER (OUTPATIENT)
Dept: GENERAL RADIOLOGY | Age: 65
Discharge: HOME | End: 2020-07-24
Attending: INTERNAL MEDICINE
Payer: MEDICARE

## 2020-07-24 ENCOUNTER — TELEPHONE (OUTPATIENT)
Dept: FAMILY MEDICINE CLINIC | Facility: CLINIC | Age: 65
End: 2020-07-24

## 2020-07-24 DIAGNOSIS — R07.81 RIB PAIN ON LEFT SIDE: ICD-10-CM

## 2020-07-24 NOTE — TELEPHONE ENCOUNTER
Patient is in for a chest xray today (rib pain left side) wants to know why Dr Aretha Clemons ordered the chest xray?

## 2020-07-24 NOTE — TELEPHONE ENCOUNTER
Patient advised that the chest xray was ordered back in September 2019  For rib pain and the note said that if rib pain continues get chest xray. Patient said that she does not have rib pain. Xray cancelled.

## 2020-08-05 ENCOUNTER — OFFICE VISIT (OUTPATIENT)
Dept: SLEEP CENTER | Age: 65
End: 2020-08-05
Attending: FAMILY MEDICINE
Payer: MEDICARE

## 2020-08-05 DIAGNOSIS — R35.1 NOCTURIA: ICD-10-CM

## 2020-08-05 DIAGNOSIS — G47.19 EXCESSIVE DAYTIME SLEEPINESS: ICD-10-CM

## 2020-08-05 DIAGNOSIS — R06.83 SNORING: ICD-10-CM

## 2020-08-05 PROCEDURE — 95810 POLYSOM 6/> YRS 4/> PARAM: CPT

## 2020-08-14 ENCOUNTER — TELEPHONE (OUTPATIENT)
Dept: FAMILY MEDICINE CLINIC | Facility: CLINIC | Age: 65
End: 2020-08-14

## 2020-08-18 NOTE — TELEPHONE ENCOUNTER
Notified patient that Dr. Erika Mcwilliams was on vacation and she will be reading all sleep studies during that time. We should be able to get back to her in several days. Patient verbalized understanding and has no further questions or concerns.

## 2020-08-21 NOTE — PROCEDURES
[address] [address],[facility] [address]       Accredited by the [facility] ([facility])    PATIENT'S NAME:        Katerin Rebolledo  ATTENDING PHYSICIAN:   Fartun Kilgore MD  REFERRING PHYSICIAN:   Martin Schaeffer.  Amina Sun MD  NOY experienced 2 apneas and 33 hypopneas for an apnea-hypopnea index of 5.1. Her REM AHI was 19.6. Her supine AHI was 120. Her respiratory disturbance index was 7.4, again worse in REM and supine sleep.     LIMB MOVEMENT DATA:  There were 520 limb movements Efficiency: 85.5% PLM Index: 76.5   RERA Count: 15 RERA Index: 2.3   RDI: 7.4 Sa02 Wilberto: 85.6%     COMMENTS    Patient arrived at the [facility] at 8:55pm for a diagnostic sleep study.  Patient was orientated to testing procedure and testing room Total Limb Movements 52 10.0 - - 52 8.0   With Snoring 2 0.4 - - 2 0.3   Spontaneous 20 3.8 3 2.4 32 4.9   Total 77 14.7 17 13.3 110 16.9       OXYGEN SATURATION SUMMARY     Wake REM NREM Total Record   Minimum OSat (%) 87.2% 85.6% 88.6% 85.6%   Maximum OS d:  08/21/2020 12:09:11  t: 08/21/2020 13:46:57  Job [id number]  [facility]/

## 2020-08-24 ENCOUNTER — SLEEP STUDY (OUTPATIENT)
Dept: FAMILY MEDICINE CLINIC | Facility: CLINIC | Age: 65
End: 2020-08-24
Payer: MEDICARE

## 2020-08-24 ENCOUNTER — TELEPHONE (OUTPATIENT)
Dept: FAMILY MEDICINE CLINIC | Facility: CLINIC | Age: 65
End: 2020-08-24

## 2020-08-24 DIAGNOSIS — G47.19 EXCESSIVE DAYTIME SLEEPINESS: Primary | ICD-10-CM

## 2020-08-24 PROCEDURE — 95810 POLYSOM 6/> YRS 4/> PARAM: CPT | Performed by: FAMILY MEDICINE

## 2020-08-24 NOTE — TELEPHONE ENCOUNTER
Left detailed message for pt asking her to call the office and schedule a f/u appt to discuss her sleep study results. Pt had a Sleep Study done on 8/5/20 at [facility].     Dr. Maru Wilkinson read sleep study and recommends a f/u appt to discuss resu

## 2020-08-27 ENCOUNTER — OFFICE VISIT (OUTPATIENT)
Dept: FAMILY MEDICINE CLINIC | Facility: CLINIC | Age: 65
End: 2020-08-27
Payer: MEDICARE

## 2020-08-27 VITALS
BODY MASS INDEX: 33.27 KG/M2 | DIASTOLIC BLOOD PRESSURE: 70 MMHG | SYSTOLIC BLOOD PRESSURE: 130 MMHG | TEMPERATURE: 98 F | HEIGHT: 67 IN | HEART RATE: 75 BPM | OXYGEN SATURATION: 96 % | RESPIRATION RATE: 16 BRPM | WEIGHT: 212 LBS

## 2020-08-27 DIAGNOSIS — G47.33 OSA (OBSTRUCTIVE SLEEP APNEA): ICD-10-CM

## 2020-08-27 DIAGNOSIS — K57.31 DIVERTICULOSIS OF LARGE INTESTINE WITHOUT PERFORATION OR ABSCESS WITH BLEEDING: ICD-10-CM

## 2020-08-27 DIAGNOSIS — M81.0 OSTEOPOROSIS OF LUMBAR SPINE: ICD-10-CM

## 2020-08-27 DIAGNOSIS — G47.61 PERIODIC LIMB MOVEMENT DISORDER (PLMD): Primary | ICD-10-CM

## 2020-08-27 PROCEDURE — 99214 OFFICE O/P EST MOD 30 MIN: CPT | Performed by: NURSE PRACTITIONER

## 2020-08-27 NOTE — PROGRESS NOTES
[facility]  SLEEP PROGRESS NOTE        HPI:   This is a 76year old female coming in for Patient presents with:  Obstructive Sleep Apnea (BHANU)  Follow - Up      HPI:     Present to review her sleep study results.  Informed of the results sinusitis, unspecified 08/20/2011   • Allergic rhinitis, cause unspecified 3/29/2010   • Bronchitis, not specified as acute or chronic 3/17/2011   • Disequilibrium    • Dizziness and giddiness     04/09/2012   • Fuchs' corneal dystrophy    • Influenza with Answered  Comment: Current Non smoker          Problem List:  Patient Active Problem List:     Pure hypercholesterolemia     Other psoriasis     Allergic rhinitis, cause unspecified     Other urinary incontinence     Herpes simplex virus type 1 (HSV-1) krishna adenopathy. Neurological: She is alert and oriented to person, place, and time. Skin: Skin is warm and dry. Psychiatric: She has a normal mood and affect.  Her behavior is normal. Judgment and thought content normal.   Nursing note and vitals reviewed CPAP mask and humidifier  chamber changed every 6 month  with the Durable medical equipment provider.          Advised if still with sleep apnea and not using CPAP has a  7 fold increase in risk of heart attack, stroke, abnormal heart rhythm  and death,

## 2020-08-28 ENCOUNTER — LABORATORY ENCOUNTER (OUTPATIENT)
Dept: LAB | Age: 65
End: 2020-08-28
Attending: INTERNAL MEDICINE
Payer: MEDICARE

## 2020-08-28 DIAGNOSIS — M81.0 OSTEOPOROSIS OF LUMBAR SPINE: ICD-10-CM

## 2020-08-28 DIAGNOSIS — K57.31 DIVERTICULOSIS OF LARGE INTESTINE WITHOUT PERFORATION OR ABSCESS WITH BLEEDING: ICD-10-CM

## 2020-08-28 DIAGNOSIS — G47.33 OSA (OBSTRUCTIVE SLEEP APNEA): ICD-10-CM

## 2020-08-28 DIAGNOSIS — E78.00 HYPERCHOLESTEREMIA: Primary | ICD-10-CM

## 2020-08-28 DIAGNOSIS — G47.61 PERIODIC LIMB MOVEMENT DISORDER (PLMD): ICD-10-CM

## 2020-08-28 LAB
CHOLEST SMN-MCNC: 236 MG/DL (ref ?–200)
DEPRECATED HBV CORE AB SER IA-ACNC: 154.8 NG/ML (ref 18–340)
HDLC SERPL-MCNC: 53 MG/DL (ref 40–59)
IRON SATURATION: 38 % (ref 15–50)
IRON SERPL-MCNC: 119 UG/DL (ref 50–170)
LDLC SERPL CALC-MCNC: 154 MG/DL (ref ?–100)
NONHDLC SERPL-MCNC: 183 MG/DL (ref ?–130)
TOTAL IRON BINDING CAPACITY: 317 UG/DL (ref 240–450)
TRANSFERRIN SERPL-MCNC: 213 MG/DL (ref 200–360)
TRIGL SERPL-MCNC: 147 MG/DL (ref 30–149)
VIT B12 SERPL-MCNC: 627 PG/ML (ref 193–986)
VIT D+METAB SERPL-MCNC: 59.4 NG/ML (ref 30–100)
VLDLC SERPL CALC-MCNC: 29 MG/DL (ref 0–30)

## 2020-08-28 PROCEDURE — 83550 IRON BINDING TEST: CPT

## 2020-08-28 PROCEDURE — 36415 COLL VENOUS BLD VENIPUNCTURE: CPT

## 2020-08-28 PROCEDURE — 82306 VITAMIN D 25 HYDROXY: CPT

## 2020-08-28 PROCEDURE — 82728 ASSAY OF FERRITIN: CPT

## 2020-08-28 PROCEDURE — 80061 LIPID PANEL: CPT

## 2020-08-28 PROCEDURE — 82607 VITAMIN B-12: CPT

## 2020-08-28 PROCEDURE — 83540 ASSAY OF IRON: CPT

## 2020-08-29 DIAGNOSIS — E78.00 HYPERCHOLESTEREMIA: Primary | ICD-10-CM

## 2020-08-31 ENCOUNTER — OFFICE VISIT (OUTPATIENT)
Dept: FAMILY MEDICINE CLINIC | Facility: CLINIC | Age: 65
End: 2020-08-31
Payer: MEDICARE

## 2020-08-31 ENCOUNTER — TELEPHONE (OUTPATIENT)
Dept: FAMILY MEDICINE CLINIC | Facility: CLINIC | Age: 65
End: 2020-08-31

## 2020-08-31 VITALS
HEART RATE: 68 BPM | BODY MASS INDEX: 33 KG/M2 | WEIGHT: 210 LBS | RESPIRATION RATE: 16 BRPM | TEMPERATURE: 98 F | DIASTOLIC BLOOD PRESSURE: 82 MMHG | SYSTOLIC BLOOD PRESSURE: 140 MMHG

## 2020-08-31 DIAGNOSIS — G47.33 OSA (OBSTRUCTIVE SLEEP APNEA): ICD-10-CM

## 2020-08-31 DIAGNOSIS — G47.61 PERIODIC LIMB MOVEMENT DISORDER (PLMD): Primary | ICD-10-CM

## 2020-08-31 DIAGNOSIS — E78.00 HYPERCHOLESTEREMIA: ICD-10-CM

## 2020-08-31 PROCEDURE — 99214 OFFICE O/P EST MOD 30 MIN: CPT | Performed by: INTERNAL MEDICINE

## 2020-08-31 NOTE — PROGRESS NOTES
Alka Moffett is a 76year old female. HPI:   Recent sleep study. Mild sleep apnea, has appt Sept 9th at [facility] for fitting and auto CPAP.    Current Outpatient Medications   Medication Sig Dispense Refill   • Red Yeast Rice Extract (RED YEAST RICE O heartburn  NEURO: denies headaches    EXAM:   /82   Pulse 68   Temp 98.2 °F (36.8 °C) (Temporal)   Resp 16   Wt 210 lb (95.3 kg)   BMI 32.89 kg/m²   GENERAL: well developed, well nourished,in no apparent distress  SKIN: no rashes,no suspicious lesion

## 2020-08-31 NOTE — TELEPHONE ENCOUNTER
----- Message from Fredy RamosSpecleDUNCAN sent at 8/31/2020  9:04 AM CDT -----  Vitamin levels are good. Iron is a low normal - recommend to increase iron in diet.

## 2020-09-02 RX ORDER — ROPINIROLE 0.25 MG/1
0.25 TABLET, FILM COATED ORAL NIGHTLY
Qty: 30 TABLET | Refills: 0 | Status: SHIPPED | OUTPATIENT
Start: 2020-09-02 | End: 2020-09-19

## 2020-09-02 NOTE — TELEPHONE ENCOUNTER
Spoke with patient. In patients sleep study her PLMD was 76.5. Her Vit D level was adequate. Is there any medication she can take?  Please advise

## 2020-09-18 ENCOUNTER — TELEPHONE (OUTPATIENT)
Dept: FAMILY MEDICINE CLINIC | Facility: CLINIC | Age: 65
End: 2020-09-18

## 2020-09-19 RX ORDER — ROPINIROLE 0.25 MG/1
0.25 TABLET, FILM COATED ORAL NIGHTLY
Qty: 30 TABLET | Refills: 0 | Status: SHIPPED | OUTPATIENT
Start: 2020-09-19 | End: 2020-09-28

## 2020-09-19 NOTE — TELEPHONE ENCOUNTER
Future appt:     Your appointments     Date & Time Appointment Department [facility])    Oct 26, 2020  2:20 PM CDT Sleep Follow Up with Smitha Wilkinson MD [address], [facility])        Dec 30, 2020  9:00 AM

## 2020-09-28 RX ORDER — ROPINIROLE 0.25 MG/1
TABLET, FILM COATED ORAL
Qty: 30 TABLET | Refills: 0 | Status: SHIPPED | OUTPATIENT
Start: 2020-09-28 | End: 2020-10-20

## 2020-09-28 NOTE — TELEPHONE ENCOUNTER
Please advise refill of Ropinirole 0.25mg. Last Rx: 9/19/20      Future appt:     Your appointments     Date & Time Appointment Department [facility])    Oct 26, 2020  2:20 PM CDT Sleep Follow Up with Caprice Oliver MD [address] [place] [address] follow-ups on file.

## 2020-10-20 RX ORDER — ROPINIROLE 0.25 MG/1
TABLET, FILM COATED ORAL
Qty: 30 TABLET | Refills: 2 | Status: SHIPPED | OUTPATIENT
Start: 2020-10-20 | End: 2020-10-26

## 2020-10-20 NOTE — TELEPHONE ENCOUNTER
Future appt:     Your appointments     Date & Time Appointment Department [facility])    Oct 26, 2020  2:20 PM CDT Sleep Follow Up with Francisco Park MD [address] [facility], Mony Juares ([facility])        Dec 30, 2020  9:00 AM

## 2020-10-26 ENCOUNTER — OFFICE VISIT (OUTPATIENT)
Dept: FAMILY MEDICINE CLINIC | Facility: CLINIC | Age: 65
End: 2020-10-26
Payer: MEDICARE

## 2020-10-26 VITALS
HEIGHT: 67 IN | TEMPERATURE: 98 F | BODY MASS INDEX: 33.3 KG/M2 | DIASTOLIC BLOOD PRESSURE: 78 MMHG | RESPIRATION RATE: 16 BRPM | SYSTOLIC BLOOD PRESSURE: 136 MMHG | HEART RATE: 68 BPM | OXYGEN SATURATION: 98 % | WEIGHT: 212.19 LBS

## 2020-10-26 DIAGNOSIS — G47.33 OSA (OBSTRUCTIVE SLEEP APNEA): Primary | ICD-10-CM

## 2020-10-26 PROCEDURE — 99214 OFFICE O/P EST MOD 30 MIN: CPT | Performed by: FAMILY MEDICINE

## 2020-10-26 RX ORDER — ROPINIROLE 0.25 MG/1
0.25 TABLET, FILM COATED ORAL NIGHTLY
Qty: 30 TABLET | Refills: 5 | Status: SHIPPED | OUTPATIENT
Start: 2020-10-26 | End: 2021-02-03

## 2020-10-26 NOTE — PROGRESS NOTES
[facility]        HPI:   This is a 76year old female coming in for Patient presents with:  Obstructive Sleep Apnea (BHANU): sleep f/u      HPI:  Patient is doing well with her sleep therapy.   She is overall feeling be Gender Male? - -   Score and BHANU Risk - -   Obstructive Sleep Apnea Risk - -       Lab Results   Component Value Date    IRON 119 08/28/2020    TRANSFERRIN 213 08/28/2020    TIBCP 317 08/28/2020    SAT 38 08/28/2020     Lab Results   Component Value Date (VITAMIN C OR) Take 1,000 mg by mouth 2 (two) times daily. • Calcium-Magnesium 200-100 MG Oral Tab Take 1 tablet by mouth nightly. • Cholecalciferol (VITAMIN D3) 1.25 MG (59805 UT) Oral Cap Take 5,000 Int'l Units by mouth daily.      • Garlic 381 MG kg)  08/27/20 : 212 lb (96.2 kg)  07/20/20 : 213 lb 3.2 oz (96.7 kg)  06/25/20 : 213 lb 4 oz (96.7 kg)  10/31/19 : 206 lb (93.4 kg)    BP Readings from Last 3 Encounters:  10/26/20 : 136/78  08/31/20 : 140/82  08/27/20 : 130/70    Ideal body weight: 61.6 k target heart rate . Advised patient to change filters,masks,hoses  and tubes and equiptment on a  regular schedule.   Filters and seals shall be changed every 1 month,  Hoses every 3 months,   CPAP mask and humidifier  chamber changed every 6 month  wit

## 2020-10-28 ENCOUNTER — OFFICE VISIT (OUTPATIENT)
Dept: FAMILY MEDICINE CLINIC | Facility: CLINIC | Age: 65
End: 2020-10-28
Payer: MEDICARE

## 2020-10-28 ENCOUNTER — TELEPHONE (OUTPATIENT)
Dept: FAMILY MEDICINE CLINIC | Facility: CLINIC | Age: 65
End: 2020-10-28

## 2020-10-28 VITALS
RESPIRATION RATE: 16 BRPM | BODY MASS INDEX: 33.59 KG/M2 | DIASTOLIC BLOOD PRESSURE: 82 MMHG | WEIGHT: 214 LBS | HEART RATE: 73 BPM | SYSTOLIC BLOOD PRESSURE: 120 MMHG | HEIGHT: 67 IN | OXYGEN SATURATION: 98 % | TEMPERATURE: 98 F

## 2020-10-28 DIAGNOSIS — L03.114 CELLULITIS OF LEFT UPPER EXTREMITY: Primary | ICD-10-CM

## 2020-10-28 PROCEDURE — 99213 OFFICE O/P EST LOW 20 MIN: CPT | Performed by: INTERNAL MEDICINE

## 2020-10-28 RX ORDER — AMOXICILLIN 500 MG/1
500 CAPSULE ORAL 3 TIMES DAILY
Qty: 21 CAPSULE | Refills: 0 | COMMUNITY
Start: 2020-10-28 | End: 2020-11-13 | Stop reason: ALTCHOICE

## 2020-10-28 RX ORDER — AMOXICILLIN 500 MG/1
500 CAPSULE ORAL 3 TIMES DAILY
Qty: 30 CAPSULE | Refills: 0 | Status: SHIPPED | OUTPATIENT
Start: 2020-10-28 | End: 2020-10-28

## 2020-10-28 NOTE — TELEPHONE ENCOUNTER
Pharmacist advised directions for Amoxicillin should be to take 3x daily for 7 days dispense #21.  V.O. Dr Dominga Shaikh RN

## 2020-10-28 NOTE — TELEPHONE ENCOUNTER
Question on the antibiotic that was suppose to be called in there per Soy Garcia.   The patient is there now

## 2020-10-28 NOTE — PROGRESS NOTES
HPI:   Milagros Ferrara is a 76year old female who presents for left arm redness and tendernes. [place]. She is not sure of there was an insect bike, just wore with redness and tenderness.       Current Outpatient Medicatio Breast Cancer Maternal Aunt       Social History    Tobacco Use      Smoking status: Never Smoker      Smokeless tobacco: Never Used      Tobacco comment: Current Non smoker     Alcohol use: No      Alcohol/week: 0.0 standard drinks    Drug use:  No

## 2020-11-13 ENCOUNTER — TELEMEDICINE (OUTPATIENT)
Dept: FAMILY MEDICINE CLINIC | Facility: CLINIC | Age: 65
End: 2020-11-13
Payer: MEDICARE

## 2020-11-13 ENCOUNTER — TELEPHONE (OUTPATIENT)
Dept: FAMILY MEDICINE CLINIC | Facility: CLINIC | Age: 65
End: 2020-11-13

## 2020-11-13 DIAGNOSIS — R19.4 CHANGE IN BOWEL HABIT: Primary | ICD-10-CM

## 2020-11-13 DIAGNOSIS — J30.2 SEASONAL ALLERGIES: ICD-10-CM

## 2020-11-13 PROCEDURE — 99213 OFFICE O/P EST LOW 20 MIN: CPT | Performed by: FAMILY MEDICINE

## 2020-11-13 NOTE — TELEPHONE ENCOUNTER
Virtual/Telephone Check-In    Hayes Suarez verbally {consents to a [employer] on 11/13/20. Patient has been referred to the [facility] website at [email or web address] to review the yearly Consent to Treat document.   Patient maine

## 2020-11-13 NOTE — PATIENT INSTRUCTIONS
I reviewed signs and symptoms of coronavirus infection versus URI/common cold, allergies, strep throat, viral tonsillitis, etc.  I reviewed current CDC guidelines for testing and isolation as well as infection control measures including face covering, freq

## 2020-11-13 NOTE — PROGRESS NOTES
Karin Paulson is a 76year old female. Telehealth outside of [facility] [address] Verbal Consent   I conducted a telehealth visit with Karin Paulson today, 11/13/20, which was completed using two-way, real-time interactive audio and video communicati days ago while shopping she had an urge to defecate and was unable to control her bowels until she got to the washroom. She has a vague feeling of the need for a bowel movement but not urgency.   She thinks she may have some increased urination but denies manifestations 02/07/2011   • Other psoriasis 3/29/2010   • Other urinary incontinence 3/29/2010   • Pure hypercholesterolemia 3/29/2010   • Sprain of neck 1/20/2011      Social History:  Social History    Tobacco Use      Smoking status: Never Smoker her to take her probiotic twice daily and to add a fiber supplement such as Metamucil or FiberCon daily. I have asked her to isolate until she gets results of her testing back.   I reviewed signs and symptoms of progressive respiratory compromise  Call or

## 2020-12-07 ENCOUNTER — TELEPHONE (OUTPATIENT)
Dept: FAMILY MEDICINE CLINIC | Facility: CLINIC | Age: 65
End: 2020-12-07

## 2020-12-07 NOTE — TELEPHONE ENCOUNTER
MOUTH TINGLING LAST NIGHT AND TODAY, NO SWELLING OF MOUTH OR TONGUE. O2 IS 97% AND TEMP IS 98.5. DID HAVE MILD SOB ONLY WHEN WALING AROUND AND MILD BACK PAIN BETWEEN SHOULDER BLADES.    PLEASE ADVISE

## 2020-12-07 NOTE — TELEPHONE ENCOUNTER
These symptoms are not covid, but maybe inflammatory from something ingested, if persists the I can evaluate in the office, try to think about the most recent exposures to anything new, cosmetics, foods, herbal, OTC meds.

## 2020-12-07 NOTE — TELEPHONE ENCOUNTER
Patient said she noticed some tingling around her mouth and lips starting Friday and was worse yesterday, she denies hives. She said her bottom lip looks like its swollen around the edges.  She said she has a slight headache and slight sob, oxygen saturatio

## 2021-02-02 ENCOUNTER — LABORATORY ENCOUNTER (OUTPATIENT)
Dept: LAB | Age: 66
End: 2021-02-02
Attending: INTERNAL MEDICINE
Payer: MEDICARE

## 2021-02-02 DIAGNOSIS — Z20.822 EXPOSURE TO COVID-19 VIRUS: Primary | ICD-10-CM

## 2021-02-02 DIAGNOSIS — E78.00 HYPERCHOLESTEREMIA: ICD-10-CM

## 2021-02-02 LAB
ALBUMIN SERPL-MCNC: 4 G/DL (ref 3.4–5)
ALBUMIN/GLOB SERPL: 1.2 {RATIO} (ref 1–2)
ALP LIVER SERPL-CCNC: 70 U/L
ALT SERPL-CCNC: 27 U/L
ANION GAP SERPL CALC-SCNC: 5 MMOL/L (ref 0–18)
AST SERPL-CCNC: 15 U/L (ref 15–37)
BILIRUB SERPL-MCNC: 0.4 MG/DL (ref 0.1–2)
BUN BLD-MCNC: 13 MG/DL (ref 7–18)
BUN/CREAT SERPL: 15.7 (ref 10–20)
CALCIUM BLD-MCNC: 8.9 MG/DL (ref 8.5–10.1)
CHLORIDE SERPL-SCNC: 109 MMOL/L (ref 98–112)
CHOLEST SMN-MCNC: 263 MG/DL (ref ?–200)
CO2 SERPL-SCNC: 25 MMOL/L (ref 21–32)
CREAT BLD-MCNC: 0.83 MG/DL
GLOBULIN PLAS-MCNC: 3.4 G/DL (ref 2.8–4.4)
GLUCOSE BLD-MCNC: 95 MG/DL (ref 70–99)
HDLC SERPL-MCNC: 58 MG/DL (ref 40–59)
LDLC SERPL CALC-MCNC: 180 MG/DL (ref ?–100)
M PROTEIN MFR SERPL ELPH: 7.4 G/DL (ref 6.4–8.2)
NONHDLC SERPL-MCNC: 205 MG/DL (ref ?–130)
OSMOLALITY SERPL CALC.SUM OF ELEC: 288 MOSM/KG (ref 275–295)
PATIENT FASTING Y/N/NP: YES
PATIENT FASTING Y/N/NP: YES
POTASSIUM SERPL-SCNC: 3.9 MMOL/L (ref 3.5–5.1)
SARS-COV-2 IGG+IGM SERPL QL IA: NONREACTIVE
SODIUM SERPL-SCNC: 139 MMOL/L (ref 136–145)
TRIGL SERPL-MCNC: 124 MG/DL (ref 30–149)
VLDLC SERPL CALC-MCNC: 25 MG/DL (ref 0–30)

## 2021-02-02 PROCEDURE — 86769 SARS-COV-2 COVID-19 ANTIBODY: CPT

## 2021-02-02 PROCEDURE — 80061 LIPID PANEL: CPT

## 2021-02-02 PROCEDURE — 80053 COMPREHEN METABOLIC PANEL: CPT

## 2021-02-02 PROCEDURE — 36415 COLL VENOUS BLD VENIPUNCTURE: CPT

## 2021-02-03 ENCOUNTER — OFFICE VISIT (OUTPATIENT)
Dept: FAMILY MEDICINE CLINIC | Facility: CLINIC | Age: 66
End: 2021-02-03
Payer: MEDICARE

## 2021-02-03 VITALS
DIASTOLIC BLOOD PRESSURE: 82 MMHG | WEIGHT: 216 LBS | RESPIRATION RATE: 18 BRPM | HEIGHT: 68 IN | SYSTOLIC BLOOD PRESSURE: 130 MMHG | OXYGEN SATURATION: 96 % | TEMPERATURE: 97 F | HEART RATE: 104 BPM | BODY MASS INDEX: 32.74 KG/M2

## 2021-02-03 DIAGNOSIS — R06.02 SOB (SHORTNESS OF BREATH): Primary | ICD-10-CM

## 2021-02-03 DIAGNOSIS — G47.33 OSA (OBSTRUCTIVE SLEEP APNEA): ICD-10-CM

## 2021-02-03 PROCEDURE — 99214 OFFICE O/P EST MOD 30 MIN: CPT | Performed by: INTERNAL MEDICINE

## 2021-02-03 NOTE — PROGRESS NOTES
Ansley Wall is a 76year old female. HPI:   Pt has been gaining weight and feels more SOB since mid December. She has had a esophageal stricture in the past. She  has been taking Mirapex for 4 months. Using CPAP at night.     Current Outpatient Medi exertion  CARDIOVASCULAR: denies chest pain on exertion  GI: denies abdominal pain and denies heartburn  NEURO: denies headaches    EXAM:   /82 (BP Location: Right arm, Patient Position: Sitting, Cuff Size: adult)   Pulse 104   Temp 97.1 °F (36.2 °C)

## 2021-02-05 ENCOUNTER — HOSPITAL ENCOUNTER (OUTPATIENT)
Dept: GENERAL RADIOLOGY | Age: 66
Discharge: HOME | End: 2021-02-05
Attending: INTERNAL MEDICINE
Payer: MEDICARE

## 2021-02-05 DIAGNOSIS — R06.02 SOB (SHORTNESS OF BREATH): ICD-10-CM

## 2021-02-05 PROCEDURE — 71046 X-RAY EXAM CHEST 2 VIEWS: CPT | Performed by: INTERNAL MEDICINE

## 2021-03-13 DIAGNOSIS — Z23 NEED FOR VACCINATION: ICD-10-CM

## 2021-03-15 ENCOUNTER — TELEPHONE (OUTPATIENT)
Dept: FAMILY MEDICINE CLINIC | Facility: CLINIC | Age: 66
End: 2021-03-15

## 2021-03-15 NOTE — TELEPHONE ENCOUNTER
Patient said that she is getting her second Covid vaccine April 2. She will bring her card in once she gets the second dose.

## 2021-06-21 ENCOUNTER — TELEPHONE (OUTPATIENT)
Dept: FAMILY MEDICINE CLINIC | Facility: CLINIC | Age: 66
End: 2021-06-21

## 2021-06-21 NOTE — TELEPHONE ENCOUNTER
Called pt and relied information about CPAP recall. Sent Wrncht message as well.  Pt stated understanding and will still come in for BHANU visit

## 2021-06-21 NOTE — TELEPHONE ENCOUNTER
Pt would like a call back in regards to cpap recall, pt schedule appt on 06/28/21 with Ana Maria but would like more information.

## 2021-06-24 ENCOUNTER — TELEPHONE (OUTPATIENT)
Dept: FAMILY MEDICINE CLINIC | Facility: CLINIC | Age: 66
End: 2021-06-24

## 2021-06-24 DIAGNOSIS — Z12.31 SCREENING MAMMOGRAM, ENCOUNTER FOR: Primary | ICD-10-CM

## 2021-06-24 NOTE — TELEPHONE ENCOUNTER
PLEASE HAVE  PUT ORDER IN FOR A MAMMO, SHE WANTS TO DO IT IN [place], SHE WOULD LIKE TO HAVE IT DONE BEFORE SHE SEES DR Elisa Jasso.

## 2021-06-24 NOTE — TELEPHONE ENCOUNTER
LM for patient that order is in she can call [facility] central scheduling.

## 2021-06-26 ENCOUNTER — HOSPITAL ENCOUNTER (OUTPATIENT)
Dept: MAMMOGRAPHY | Age: 66
Discharge: HOME | End: 2021-06-26
Attending: INTERNAL MEDICINE
Payer: MEDICARE

## 2021-06-26 DIAGNOSIS — Z12.31 SCREENING MAMMOGRAM, ENCOUNTER FOR: ICD-10-CM

## 2021-06-26 PROCEDURE — 77063 BREAST TOMOSYNTHESIS BI: CPT | Performed by: INTERNAL MEDICINE

## 2021-06-26 PROCEDURE — 77067 SCR MAMMO BI INCL CAD: CPT | Performed by: INTERNAL MEDICINE

## 2021-06-28 ENCOUNTER — OFFICE VISIT (OUTPATIENT)
Dept: FAMILY MEDICINE CLINIC | Facility: CLINIC | Age: 66
End: 2021-06-28
Payer: MEDICARE

## 2021-06-28 VITALS
OXYGEN SATURATION: 95 % | BODY MASS INDEX: 30.77 KG/M2 | DIASTOLIC BLOOD PRESSURE: 88 MMHG | HEIGHT: 68 IN | RESPIRATION RATE: 18 BRPM | WEIGHT: 203 LBS | TEMPERATURE: 98 F | HEART RATE: 70 BPM | SYSTOLIC BLOOD PRESSURE: 132 MMHG

## 2021-06-28 DIAGNOSIS — G47.33 OSA (OBSTRUCTIVE SLEEP APNEA): Primary | ICD-10-CM

## 2021-06-28 DIAGNOSIS — G25.81 RESTLESS LEGS SYNDROME (RLS): ICD-10-CM

## 2021-06-28 PROCEDURE — 99214 OFFICE O/P EST MOD 30 MIN: CPT | Performed by: NURSE PRACTITIONER

## 2021-06-28 RX ORDER — PANTOPRAZOLE SODIUM 40 MG/1
40 TABLET, DELAYED RELEASE ORAL
COMMUNITY
Start: 2021-06-25 | End: 2021-06-28 | Stop reason: ALTCHOICE

## 2021-06-28 NOTE — PATIENT INSTRUCTIONS
Get a HEPA filter to put on the machine before the hose. Continue sleep therapy. Follow-up in 6 months - sooner if needed.      Advised if still with sleep apnea and not using CPAP has a  7 fold increase in risk of heart attack, stroke, abnormal heart

## 2021-06-28 NOTE — PROGRESS NOTES
[facility]        HPI:   This is a 78year old female coming in for Patient presents with:  Obstructive Sleep Apnea (BHANU)      HPI:     Present for sleep therapy follow up.  Has not used the machine for the past week - -   Neck circumference >16 inches (40 cm)?  - -   Gender Male? - -   Score and BHANU Risk - -   Obstructive Sleep Apnea Risk - -         Past Medical History:   Diagnosis Date   • Abdominal pain, unspecified site 07/07/2011   • Acute sinusitis, unspecified • Garlic 666 MG Oral Tab Take 2 tablets by mouth daily. • omega-3 fatty acids 1000 MG Oral Cap Take 1,000 mg by mouth daily. • Lactobacillus (PROBIOTIC ACIDOPHILUS OR) Take 2 tablets by mouth 2 (two) times daily.      • Fexofenadine HCl 60 MG Or well-developed. HENT:      Head: Normocephalic and atraumatic. Right Ear: External ear normal. There is impacted cerumen.       Left Ear: Tympanic membrane, ear canal and external ear normal.      Nose: Nose normal.      Mouth/Throat:      Mouth: Muc every 1 month,  Hoses every 3 months,   CPAP mask and humidifier  chamber changed every 6 month  with the Durable medical equipment provider.          Advised if still with sleep apnea and not using CPAP has a  7 fold increase in risk of heart attack, jatin

## 2021-07-07 ENCOUNTER — LAB ENCOUNTER (OUTPATIENT)
Dept: LAB | Age: 66
End: 2021-07-07
Attending: INTERNAL MEDICINE
Payer: MEDICARE

## 2021-07-07 DIAGNOSIS — E78.00 HYPERCHOLESTEREMIA: ICD-10-CM

## 2021-07-07 DIAGNOSIS — G47.33 OSA (OBSTRUCTIVE SLEEP APNEA): Primary | ICD-10-CM

## 2021-07-07 DIAGNOSIS — R06.02 SOB (SHORTNESS OF BREATH): ICD-10-CM

## 2021-07-07 DIAGNOSIS — G47.33 OSA (OBSTRUCTIVE SLEEP APNEA): ICD-10-CM

## 2021-07-07 LAB
BASOPHILS # BLD AUTO: 0.05 X10(3) UL (ref 0–0.2)
BASOPHILS NFR BLD AUTO: 0.7 %
CHOLEST SMN-MCNC: 285 MG/DL (ref ?–200)
DEPRECATED RDW RBC AUTO: 43.3 FL (ref 35.1–46.3)
EOSINOPHIL # BLD AUTO: 0.14 X10(3) UL (ref 0–0.7)
EOSINOPHIL NFR BLD AUTO: 2 %
ERYTHROCYTE [DISTWIDTH] IN BLOOD BY AUTOMATED COUNT: 13.2 % (ref 11–15)
HCT VFR BLD AUTO: 43 %
HDLC SERPL-MCNC: 54 MG/DL (ref 40–59)
HGB BLD-MCNC: 14.2 G/DL
IMM GRANULOCYTES # BLD AUTO: 0.02 X10(3) UL (ref 0–1)
IMM GRANULOCYTES NFR BLD: 0.3 %
LDLC SERPL CALC-MCNC: 205 MG/DL (ref ?–100)
LYMPHOCYTES # BLD AUTO: 1.36 X10(3) UL (ref 1–4)
LYMPHOCYTES NFR BLD AUTO: 19.7 %
MCH RBC QN AUTO: 29.8 PG (ref 26–34)
MCHC RBC AUTO-ENTMCNC: 33 G/DL (ref 31–37)
MCV RBC AUTO: 90.3 FL
MONOCYTES # BLD AUTO: 0.83 X10(3) UL (ref 0.1–1)
MONOCYTES NFR BLD AUTO: 12 %
NEUTROPHILS # BLD AUTO: 4.49 X10 (3) UL (ref 1.5–7.7)
NEUTROPHILS # BLD AUTO: 4.49 X10(3) UL (ref 1.5–7.7)
NEUTROPHILS NFR BLD AUTO: 65.3 %
NONHDLC SERPL-MCNC: 231 MG/DL (ref ?–130)
PATIENT FASTING Y/N/NP: YES
PLATELET # BLD AUTO: 294 10(3)UL (ref 150–450)
RBC # BLD AUTO: 4.76 X10(6)UL
TRIGL SERPL-MCNC: 140 MG/DL (ref 30–149)
VLDLC SERPL CALC-MCNC: 30 MG/DL (ref 0–30)
WBC # BLD AUTO: 6.9 X10(3) UL (ref 4–11)

## 2021-07-07 PROCEDURE — 36415 COLL VENOUS BLD VENIPUNCTURE: CPT

## 2021-07-07 PROCEDURE — 80061 LIPID PANEL: CPT

## 2021-07-07 PROCEDURE — 85025 COMPLETE CBC W/AUTO DIFF WBC: CPT

## 2021-07-08 ENCOUNTER — OFFICE VISIT (OUTPATIENT)
Dept: FAMILY MEDICINE CLINIC | Facility: CLINIC | Age: 66
End: 2021-07-08
Payer: MEDICARE

## 2021-07-08 VITALS
OXYGEN SATURATION: 96 % | HEIGHT: 68 IN | SYSTOLIC BLOOD PRESSURE: 132 MMHG | TEMPERATURE: 98 F | RESPIRATION RATE: 16 BRPM | WEIGHT: 205.13 LBS | DIASTOLIC BLOOD PRESSURE: 70 MMHG | BODY MASS INDEX: 31.09 KG/M2 | HEART RATE: 81 BPM

## 2021-07-08 DIAGNOSIS — E78.00 HYPERCHOLESTEREMIA: ICD-10-CM

## 2021-07-08 DIAGNOSIS — G47.33 OSA (OBSTRUCTIVE SLEEP APNEA): ICD-10-CM

## 2021-07-08 DIAGNOSIS — I10 HTN (HYPERTENSION), BENIGN: ICD-10-CM

## 2021-07-08 DIAGNOSIS — Z00.00 ENCOUNTER FOR ANNUAL HEALTH EXAMINATION: Primary | ICD-10-CM

## 2021-07-08 DIAGNOSIS — R06.02 SOB (SHORTNESS OF BREATH): ICD-10-CM

## 2021-07-08 PROCEDURE — G0439 PPPS, SUBSEQ VISIT: HCPCS | Performed by: INTERNAL MEDICINE

## 2021-07-08 RX ORDER — SIMVASTATIN 20 MG
20 TABLET ORAL NIGHTLY
Qty: 90 TABLET | Refills: 2 | Status: SHIPPED | OUTPATIENT
Start: 2021-07-08 | End: 2021-10-06

## 2021-07-08 RX ORDER — PANTOPRAZOLE SODIUM 40 MG/1
40 TABLET, DELAYED RELEASE ORAL
COMMUNITY

## 2021-07-08 NOTE — PROGRESS NOTES
HPI:   Annmarie Jarrell is a 78year old female who presents for a Medicare Subsequent Annual Wellness visit (Pt already had Initial Annual Wellness). Pt has been better, losing weight and not feeling as tired.   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     Diverticulitis     Cellulitis of left upper extremity     BHANU (obstructive sleep apnea)     Restless legs syndrome (RLS)    Wt Readings from Last 3 Encounters:  07/08/21 : 205 lb 2 oz (93 kg)  06/28/21 : 2 (3/17/2011), Disequilibrium, Dizziness and giddiness, Fuchs' corneal dystrophy, Influenza with other respiratory manifestations (02/07/2011), Other psoriasis (3/29/2010), Other urinary incontinence (3/29/2010), Pure hypercholesterolemia (3/29/2010), and Sp Eye Chart Acuity: 20/40   Both Eyes Visual Acuity: Corrected Both Eyes Chart Acuity: 20/40   Able To Tolerate Visual Acuity: Yes      General Appearance:  Alert, cooperative, no distress, appears stated age   Head:  Normocephalic, without obvious abnormali presents for a Medicare Assessment.      PLAN SUMMARY:   Diagnoses and all orders for this visit:    Encounter for annual health examination    BHANU (obstructive sleep apnea)    SOB (shortness of breath)    Hypercholesteremia    HTN (hypertension), benign Medicare beneficiaries without apparent signs or symptoms of cardiovascular disease Lab Results   Component Value Date    CHOLEST 285 (H) 07/07/2021    HDL 54 07/07/2021     (H) 07/07/2021    TRIG 140 07/07/2021         Electrocardiogram (EKG)   Cov time    Hepatitis B One screening covered for patients with certain risk factors   -  No recommendations at this time    Tetanus Toxoid Not covered by Medicare Part B unless medically necessary (cut with metal); may be covered with your pharmacy prescripti

## 2021-07-08 NOTE — PATIENT INSTRUCTIONS
Malena Burgess Suarez's SCREENING SCHEDULE   Tests on this list are recommended by your physician but may not be covered, or covered at this frequency, by your insurer. Please check with your insurance carrier before scheduling to verify coverage.    PREVEN (CPT=77080) 06/10/2020      No recommendations at this time   Pap and Pelvic    Pap   Covered every 2 years for women at normal risk;  Annually if at high risk -  No recommendations at this time    Chlamydia Annually if high risk -  No recommendations at th [facility] regarding Advance Directives.

## 2021-11-08 ENCOUNTER — OFFICE VISIT (OUTPATIENT)
Dept: FAMILY MEDICINE CLINIC | Facility: CLINIC | Age: 66
End: 2021-11-08
Payer: MEDICARE

## 2021-11-08 VITALS
BODY MASS INDEX: 31.07 KG/M2 | HEART RATE: 86 BPM | RESPIRATION RATE: 20 BRPM | TEMPERATURE: 97 F | DIASTOLIC BLOOD PRESSURE: 62 MMHG | SYSTOLIC BLOOD PRESSURE: 118 MMHG | WEIGHT: 205 LBS | HEIGHT: 68 IN

## 2021-11-08 DIAGNOSIS — L03.311 CELLULITIS OF LEFT ABDOMINAL WALL: Primary | ICD-10-CM

## 2021-11-08 PROCEDURE — 99213 OFFICE O/P EST LOW 20 MIN: CPT | Performed by: NURSE PRACTITIONER

## 2021-11-08 RX ORDER — AMOXICILLIN AND CLAVULANATE POTASSIUM 875; 125 MG/1; MG/1
1 TABLET, FILM COATED ORAL 2 TIMES DAILY
Qty: 20 TABLET | Refills: 0 | Status: SHIPPED | OUTPATIENT
Start: 2021-11-08 | End: 2021-11-16 | Stop reason: ALTCHOICE

## 2021-11-08 NOTE — PROGRESS NOTES
HPI:   Skin  This is a new problem. Episode onset: Friday morning. The problem has been gradually worsening (more tender). Associated symptoms include abdominal pain (skin, not deep within abdomen).  Pertinent negatives include no change in bowel habit, chi neck 1/20/2011      Past Surgical History:   Procedure Laterality Date   • HYSTERECTOMY      Complete (no cancer)    • OOPHORECTOMY        Family History   Problem Relation Age of Onset   • Other (Other) Mother         MVA   • Pulmonary Disease Other Dr. Viola Grimes.

## 2021-11-10 ENCOUNTER — OFFICE VISIT (OUTPATIENT)
Dept: FAMILY MEDICINE CLINIC | Facility: CLINIC | Age: 66
End: 2021-11-10
Payer: MEDICARE

## 2021-11-10 VITALS
SYSTOLIC BLOOD PRESSURE: 128 MMHG | BODY MASS INDEX: 31.54 KG/M2 | DIASTOLIC BLOOD PRESSURE: 84 MMHG | TEMPERATURE: 99 F | HEIGHT: 68 IN | RESPIRATION RATE: 16 BRPM | WEIGHT: 208.13 LBS | OXYGEN SATURATION: 97 % | HEART RATE: 73 BPM

## 2021-11-10 DIAGNOSIS — L02.211 CUTANEOUS ABSCESS OF ABDOMINAL WALL: Primary | ICD-10-CM

## 2021-11-10 PROCEDURE — 87205 SMEAR GRAM STAIN: CPT | Performed by: INTERNAL MEDICINE

## 2021-11-10 PROCEDURE — 99214 OFFICE O/P EST MOD 30 MIN: CPT | Performed by: INTERNAL MEDICINE

## 2021-11-10 PROCEDURE — 87077 CULTURE AEROBIC IDENTIFY: CPT | Performed by: INTERNAL MEDICINE

## 2021-11-10 PROCEDURE — 87070 CULTURE OTHR SPECIMN AEROBIC: CPT | Performed by: INTERNAL MEDICINE

## 2021-11-10 RX ORDER — SULFAMETHOXAZOLE AND TRIMETHOPRIM 800; 160 MG/1; MG/1
1 TABLET ORAL 2 TIMES DAILY
Qty: 20 TABLET | Refills: 0 | Status: SHIPPED | OUTPATIENT
Start: 2021-11-10 | End: 2021-11-20

## 2021-11-11 ENCOUNTER — TELEPHONE (OUTPATIENT)
Dept: FAMILY MEDICINE CLINIC | Facility: CLINIC | Age: 66
End: 2021-11-11

## 2021-11-11 NOTE — TELEPHONE ENCOUNTER
Patient advised. She said that the bite looks slightly better but she is still having some pain and temp of 99.4. She has taken 2 doses of the antibiotic. She is going to try to go to work but is not sure if she will be able to work.  Advised to call us if

## 2021-11-11 NOTE — PROGRESS NOTES
Alka Moffett is a 78year old female. HPI:   Pt has an enlarging spot on her abdomen that had a fluctuant center and red [place] around. She has been on augmentin with no change and low grade temp. It hurts to sit and bend at the waste.     Current O History:  Social History    Tobacco Use      Smoking status: Never Smoker      Smokeless tobacco: Never Used      Tobacco comment: Current Non smoker     Vaping Use      Vaping Use: Never used    Alcohol use: No      Alcohol/week: 0.0 standard drinks    Dr

## 2021-11-15 ENCOUNTER — TELEPHONE (OUTPATIENT)
Dept: FAMILY MEDICINE CLINIC | Facility: CLINIC | Age: 66
End: 2021-11-15

## 2021-11-15 ENCOUNTER — LABORATORY ENCOUNTER (OUTPATIENT)
Dept: LAB | Age: 66
End: 2021-11-15
Attending: INTERNAL MEDICINE
Payer: MEDICARE

## 2021-11-15 DIAGNOSIS — E78.00 ELEVATED CHOLESTEROL: Primary | ICD-10-CM

## 2021-11-15 DIAGNOSIS — E78.00 ELEVATED CHOLESTEROL: ICD-10-CM

## 2021-11-15 PROCEDURE — 80061 LIPID PANEL: CPT

## 2021-11-15 PROCEDURE — 36415 COLL VENOUS BLD VENIPUNCTURE: CPT

## 2021-11-15 NOTE — TELEPHONE ENCOUNTER
Patient was here for labs. She told the phlebotomist that she needed her cholesterol rechecked. Is there anything else she needs?

## 2021-11-16 ENCOUNTER — OFFICE VISIT (OUTPATIENT)
Dept: FAMILY MEDICINE CLINIC | Facility: CLINIC | Age: 66
End: 2021-11-16
Payer: MEDICARE

## 2021-11-16 VITALS
WEIGHT: 208.13 LBS | HEART RATE: 73 BPM | TEMPERATURE: 98 F | RESPIRATION RATE: 16 BRPM | BODY MASS INDEX: 31.54 KG/M2 | HEIGHT: 68 IN | OXYGEN SATURATION: 98 % | DIASTOLIC BLOOD PRESSURE: 78 MMHG | SYSTOLIC BLOOD PRESSURE: 126 MMHG

## 2021-11-16 DIAGNOSIS — E78.00 HYPERCHOLESTEREMIA: ICD-10-CM

## 2021-11-16 DIAGNOSIS — Z23 NEED FOR VACCINATION: ICD-10-CM

## 2021-11-16 DIAGNOSIS — I10 HTN (HYPERTENSION), BENIGN: Primary | ICD-10-CM

## 2021-11-16 DIAGNOSIS — Z01.810 PREOP CARDIOVASCULAR EXAM: ICD-10-CM

## 2021-11-16 PROCEDURE — 90662 IIV NO PRSV INCREASED AG IM: CPT | Performed by: INTERNAL MEDICINE

## 2021-11-16 PROCEDURE — G0008 ADMIN INFLUENZA VIRUS VAC: HCPCS | Performed by: INTERNAL MEDICINE

## 2021-11-16 PROCEDURE — 99214 OFFICE O/P EST MOD 30 MIN: CPT | Performed by: INTERNAL MEDICINE

## 2021-11-16 PROCEDURE — 93000 ELECTROCARDIOGRAM COMPLETE: CPT | Performed by: INTERNAL MEDICINE

## 2021-11-16 RX ORDER — LEVOTHYROXINE SODIUM 0.03 MG/1
25 TABLET ORAL
COMMUNITY

## 2021-11-16 NOTE — PROGRESS NOTES
Rocio Hernández is a 78year old female who presents for a pre-operative physical exam. Patient is to have sacral stimulator, 11/18/2021. HPI:   Pt complains of nothing, She is feeling very confident about this trial and progressed with PT.  She is e hypercholesterolemia 3/29/2010   • Sprain of neck 1/20/2011      Past Surgical History:   Procedure Laterality Date   • HYSTERECTOMY      Complete (no cancer)    • OOPHORECTOMY        Family History   Problem Relation Age of Onset   • Other (Other) Mother mass  LUNGS: clear to auscultation  CARDIO: RRR without murmur  GI: good BS's,no masses, HSM or tenderness  : deferred  RECTAL: good rectal tone, prostate shows no masses, stool is OB negative  MUSCULOSKELETAL: back is not tender,FROM of the back  EXTREM

## 2021-11-16 NOTE — PROGRESS NOTES
HPI:   Rocio Hernández is a 78year old female who presents for a complete physical exam. Symptoms: {MPN GYNE ROS:66008}. Patient complains of ***.        Immunization History  Administered            Date(s) Administered    Covid-19 Vaccine Moderna 100 m mouth before breakfast.     • sulfamethoxazole-trimethoprim DS (BACTRIM DS) 800-160 MG Oral Tab per tablet Take 1 tablet by mouth 2 (two) times daily for 10 days.  20 tablet 0   • Pantoprazole Sodium 40 MG Oral Tab EC Take 40 mg by mouth every morning befor comment: Current Non smoker     Vaping Use      Vaping Use: Never used    Alcohol use: No      Alcohol/week: 0.0 standard drinks    Drug use: No    Occ: ***. : ***. Children: ***. Exercise: {MPNEXERCISE:98320::\"none\"}.   Diet: {MPNDIET:71149::\"d exam. *** Pap and pelvic done. Order put in for mammogram and dexascan. Self breast exam explained. Health maintenance, will check fasting Lipids, CMP, and CBC. Pt referred for screening colonoscopy.  Pt info handouts given for: exercise, low fat diet and b

## 2021-11-17 ENCOUNTER — TELEPHONE (OUTPATIENT)
Dept: FAMILY MEDICINE CLINIC | Facility: CLINIC | Age: 66
End: 2021-11-17

## 2021-11-17 NOTE — TELEPHONE ENCOUNTER
Clair Hurst with [facility] is callling they need something faxed over saying that Thetate Glatter is cleared for surgery please fax to [phone]

## 2021-12-01 ENCOUNTER — TELEPHONE (OUTPATIENT)
Dept: FAMILY MEDICINE CLINIC | Facility: CLINIC | Age: 66
End: 2021-12-01

## 2021-12-02 ENCOUNTER — NURSE ONLY (OUTPATIENT)
Dept: FAMILY MEDICINE CLINIC | Facility: CLINIC | Age: 66
End: 2021-12-02
Payer: MEDICARE

## 2021-12-28 ENCOUNTER — OFFICE VISIT (OUTPATIENT)
Dept: FAMILY MEDICINE CLINIC | Facility: CLINIC | Age: 66
End: 2021-12-28
Payer: MEDICARE

## 2021-12-28 ENCOUNTER — LAB ENCOUNTER (OUTPATIENT)
Dept: LAB | Age: 66
End: 2021-12-28
Attending: NURSE PRACTITIONER
Payer: MEDICARE

## 2021-12-28 VITALS
HEART RATE: 76 BPM | WEIGHT: 215 LBS | SYSTOLIC BLOOD PRESSURE: 142 MMHG | OXYGEN SATURATION: 96 % | BODY MASS INDEX: 32.58 KG/M2 | TEMPERATURE: 98 F | HEIGHT: 68 IN | RESPIRATION RATE: 18 BRPM | DIASTOLIC BLOOD PRESSURE: 82 MMHG

## 2021-12-28 DIAGNOSIS — E61.1 LOW SERUM IRON: ICD-10-CM

## 2021-12-28 DIAGNOSIS — E55.9 VITAMIN D DEFICIENCY: ICD-10-CM

## 2021-12-28 DIAGNOSIS — G47.61 PLMD (PERIODIC LIMB MOVEMENT DISORDER): ICD-10-CM

## 2021-12-28 DIAGNOSIS — G47.33 OSA (OBSTRUCTIVE SLEEP APNEA): Primary | ICD-10-CM

## 2021-12-28 DIAGNOSIS — E53.8 B12 DEFICIENCY: ICD-10-CM

## 2021-12-28 PROBLEM — R39.15 URINARY URGENCY: Status: ACTIVE | Noted: 2021-10-05

## 2021-12-28 PROBLEM — N32.81 OVERACTIVE BLADDER: Status: ACTIVE | Noted: 2021-10-05

## 2021-12-28 PROBLEM — R35.0 URINARY FREQUENCY: Status: ACTIVE | Noted: 2021-10-05

## 2021-12-28 PROBLEM — N39.41 URGE URINARY INCONTINENCE: Status: ACTIVE | Noted: 2021-10-05

## 2021-12-28 PROBLEM — N32.81 URGENCY-FREQUENCY SYNDROME: Status: ACTIVE | Noted: 2021-10-05

## 2021-12-28 PROCEDURE — 83540 ASSAY OF IRON: CPT | Performed by: NURSE PRACTITIONER

## 2021-12-28 PROCEDURE — 82728 ASSAY OF FERRITIN: CPT | Performed by: NURSE PRACTITIONER

## 2021-12-28 PROCEDURE — 82306 VITAMIN D 25 HYDROXY: CPT | Performed by: NURSE PRACTITIONER

## 2021-12-28 PROCEDURE — 82607 VITAMIN B-12: CPT | Performed by: NURSE PRACTITIONER

## 2021-12-28 PROCEDURE — 83550 IRON BINDING TEST: CPT | Performed by: NURSE PRACTITIONER

## 2021-12-28 PROCEDURE — 99214 OFFICE O/P EST MOD 30 MIN: CPT | Performed by: NURSE PRACTITIONER

## 2021-12-28 PROCEDURE — 36415 COLL VENOUS BLD VENIPUNCTURE: CPT | Performed by: NURSE PRACTITIONER

## 2021-12-28 NOTE — PROGRESS NOTES
[facility]  [facility]        HPI:   This is a 78year old female coming in for Patient presents with:  Obstructive Sleep Apnea (BHANU)      HPI:     Patient is present for sleep therapy follow up.  States that she is recuperating circumference >16 inches (40 cm)?  - -   Gender Male? - -   Score and BHANU Risk - -   Obstructive Sleep Apnea Risk - -         Past Medical History:   Diagnosis Date   • Abdominal pain, unspecified site 07/07/2011   • Acute sinusitis, unspecified 08/20/2011 SIG AND QTY     • simvastatin 20 MG Oral Tab      • levothyroxine (SYNTHROID) 25 MCG Oral Tab Take 25 mcg by mouth before breakfast.     • Pantoprazole Sodium 40 MG Oral Tab EC Take 40 mg by mouth every morning before breakfast. daily before meals     • Ga Readings from Last 3 Encounters:  12/28/21 : 142/82  11/16/21 : 126/78  11/10/21 : 128/84        Vital signs reviewed. Physical Exam  Vitals and nursing note reviewed. Constitutional:       General: She is not in acute distress.      Appearance: Normal a sooner if needed. Advised if still with sleep apnea and not using CPAP has a  7 fold increase in risk of heart attack, stroke, abnormal heart rhythm  and death,  increased risk of driving accidents. Advised to refrain from driving when sleepy.     COM treatments as a result of today.        Sadiq Encarnacion, APRN  12/28/2021  8:58 AM

## 2022-03-02 ENCOUNTER — TELEPHONE (OUTPATIENT)
Dept: FAMILY MEDICINE CLINIC | Facility: CLINIC | Age: 67
End: 2022-03-02

## 2022-03-02 NOTE — TELEPHONE ENCOUNTER
Patient said that when she exerts herself she has some SOB. She denies being in any distress right now. She said she has also been getting headaches in the last month.

## 2022-03-02 NOTE — TELEPHONE ENCOUNTER
Pt is being seen for water retention tomorrow in her legs and ankles. She said she is has SOB at times. Call pt.

## 2022-03-03 ENCOUNTER — OFFICE VISIT (OUTPATIENT)
Dept: FAMILY MEDICINE CLINIC | Facility: CLINIC | Age: 67
End: 2022-03-03
Payer: MEDICARE

## 2022-03-03 VITALS
HEART RATE: 70 BPM | WEIGHT: 216.25 LBS | BODY MASS INDEX: 32.77 KG/M2 | RESPIRATION RATE: 18 BRPM | OXYGEN SATURATION: 96 % | SYSTOLIC BLOOD PRESSURE: 142 MMHG | TEMPERATURE: 98 F | DIASTOLIC BLOOD PRESSURE: 80 MMHG | HEIGHT: 68 IN

## 2022-03-03 DIAGNOSIS — R06.00 DOE (DYSPNEA ON EXERTION): Primary | ICD-10-CM

## 2022-03-03 PROCEDURE — 99214 OFFICE O/P EST MOD 30 MIN: CPT | Performed by: INTERNAL MEDICINE

## 2022-03-15 ENCOUNTER — TELEPHONE (OUTPATIENT)
Dept: FAMILY MEDICINE CLINIC | Facility: CLINIC | Age: 67
End: 2022-03-15

## 2022-03-15 RX ORDER — LOSARTAN POTASSIUM 25 MG/1
25 TABLET ORAL DAILY
Qty: 30 TABLET | Refills: 0 | Status: SHIPPED | OUTPATIENT
Start: 2022-03-15 | End: 2022-04-14

## 2022-03-15 NOTE — TELEPHONE ENCOUNTER
No heart disease, but blood pressure did get high and cause symptoms. I suggest you start an ARB and recheck in 2 weeks.

## 2022-03-16 ENCOUNTER — MED REC SCAN ONLY (OUTPATIENT)
Dept: FAMILY MEDICINE CLINIC | Facility: CLINIC | Age: 67
End: 2022-03-16

## 2022-03-16 NOTE — TELEPHONE ENCOUNTER
Patient advised. She said that she is coming in to talk to Dr Latricia Lu about the results tomorrow.

## 2022-03-17 ENCOUNTER — OFFICE VISIT (OUTPATIENT)
Dept: FAMILY MEDICINE CLINIC | Facility: CLINIC | Age: 67
End: 2022-03-17
Payer: MEDICARE

## 2022-03-17 VITALS
TEMPERATURE: 99 F | BODY MASS INDEX: 33 KG/M2 | RESPIRATION RATE: 18 BRPM | DIASTOLIC BLOOD PRESSURE: 76 MMHG | HEART RATE: 70 BPM | WEIGHT: 217.25 LBS | SYSTOLIC BLOOD PRESSURE: 138 MMHG | OXYGEN SATURATION: 95 %

## 2022-03-17 DIAGNOSIS — I10 PRIMARY HYPERTENSION: Primary | ICD-10-CM

## 2022-03-17 DIAGNOSIS — E78.00 HYPERCHOLESTEREMIA: ICD-10-CM

## 2022-03-17 PROCEDURE — 99214 OFFICE O/P EST MOD 30 MIN: CPT | Performed by: INTERNAL MEDICINE

## 2022-04-13 ENCOUNTER — PATIENT MESSAGE (OUTPATIENT)
Dept: FAMILY MEDICINE CLINIC | Facility: CLINIC | Age: 67
End: 2022-04-13

## 2022-04-13 RX ORDER — LOSARTAN POTASSIUM 25 MG/1
25 TABLET ORAL DAILY
Qty: 90 TABLET | Refills: 0 | Status: SHIPPED | OUTPATIENT
Start: 2022-04-13 | End: 2022-07-12

## 2022-04-13 RX ORDER — SIMVASTATIN 20 MG
20 TABLET ORAL NIGHTLY
Qty: 90 TABLET | Refills: 0 | Status: SHIPPED | OUTPATIENT
Start: 2022-04-13 | End: 2022-07-12

## 2022-04-13 NOTE — TELEPHONE ENCOUNTER
Last refill on Simvastatin 7/8/21  Last refill on Losartan 3/15/22 #30  Both refilled.  AP.O. Dr Olga Merlin RN

## 2022-04-21 ENCOUNTER — APPOINTMENT (OUTPATIENT)
Dept: CT IMAGING | Age: 67
End: 2022-04-21
Attending: NURSE PRACTITIONER
Payer: MEDICARE

## 2022-04-21 ENCOUNTER — TELEPHONE (OUTPATIENT)
Dept: FAMILY MEDICINE CLINIC | Facility: CLINIC | Age: 67
End: 2022-04-21

## 2022-04-21 ENCOUNTER — HOSPITAL ENCOUNTER (OUTPATIENT)
Age: 67
Discharge: HOME | End: 2022-04-21
Payer: MEDICARE

## 2022-04-21 VITALS
WEIGHT: 216 LBS | OXYGEN SATURATION: 98 % | RESPIRATION RATE: 14 BRPM | BODY MASS INDEX: 33.51 KG/M2 | HEIGHT: 67.5 IN | TEMPERATURE: 98 F | HEART RATE: 63 BPM | SYSTOLIC BLOOD PRESSURE: 142 MMHG | DIASTOLIC BLOOD PRESSURE: 87 MMHG

## 2022-04-21 DIAGNOSIS — R19.7 DIARRHEA, UNSPECIFIED TYPE: ICD-10-CM

## 2022-04-21 DIAGNOSIS — R10.9 ABDOMINAL PAIN, ACUTE: Primary | ICD-10-CM

## 2022-04-21 LAB
#MXD IC: 1 X10ˆ3/UL (ref 0.1–1)
BUN BLD-MCNC: 13 MG/DL (ref 7–18)
CHLORIDE BLD-SCNC: 105 MMOL/L (ref 98–112)
CO2 BLD-SCNC: 25 MMOL/L (ref 21–32)
CREAT BLD-MCNC: 0.7 MG/DL
GLUCOSE BLD-MCNC: 92 MG/DL (ref 70–99)
HCT VFR BLD AUTO: 41.3 %
HCT VFR BLD CALC: 41 %
HGB BLD-MCNC: 13.6 G/DL
ISTAT IONIZED CALCIUM FOR CHEM 8: 1.16 MMOL/L (ref 1.12–1.32)
LYMPHOCYTES # BLD AUTO: 2.1 X10ˆ3/UL (ref 1–4)
LYMPHOCYTES NFR BLD AUTO: 29.3 %
MCH RBC QN AUTO: 30 PG (ref 26–34)
MCHC RBC AUTO-ENTMCNC: 32.9 G/DL (ref 31–37)
MCV RBC AUTO: 91 FL (ref 80–100)
MIXED CELL %: 13.6 %
NEUTROPHILS # BLD AUTO: 4 X10ˆ3/UL (ref 1.5–7.7)
NEUTROPHILS NFR BLD AUTO: 57.1 %
PLATELET # BLD AUTO: 269 X10ˆ3/UL (ref 150–450)
POCT BILIRUBIN URINE: NEGATIVE
POCT GLUCOSE URINE: NEGATIVE MG/DL
POCT KETONE URINE: NEGATIVE MG/DL
POCT LEUKOCYTE ESTERASE URINE: NEGATIVE
POCT NITRITE URINE: NEGATIVE
POCT PH URINE: 7 (ref 5–8)
POCT PROTEIN URINE: NEGATIVE MG/DL
POCT SPECIFIC GRAVITY URINE: 1.01
POCT URINE CLARITY: CLEAR
POCT URINE COLOR: YELLOW
POCT UROBILINOGEN URINE: 0.2 MG/DL
POTASSIUM BLD-SCNC: 3.9 MMOL/L (ref 3.6–5.1)
RBC # BLD AUTO: 4.54 X10ˆ6/UL
SARS-COV-2 RNA RESP QL NAA+PROBE: NOT DETECTED
SODIUM BLD-SCNC: 142 MMOL/L (ref 136–145)
WBC # BLD AUTO: 7.1 X10ˆ3/UL (ref 4–11)

## 2022-04-21 PROCEDURE — 99214 OFFICE O/P EST MOD 30 MIN: CPT | Performed by: NURSE PRACTITIONER

## 2022-04-21 PROCEDURE — 74177 CT ABD & PELVIS W/CONTRAST: CPT | Performed by: NURSE PRACTITIONER

## 2022-04-21 PROCEDURE — 81002 URINALYSIS NONAUTO W/O SCOPE: CPT | Performed by: NURSE PRACTITIONER

## 2022-04-21 PROCEDURE — 80047 BASIC METABLC PNL IONIZED CA: CPT | Performed by: NURSE PRACTITIONER

## 2022-04-21 PROCEDURE — 85025 COMPLETE CBC W/AUTO DIFF WBC: CPT | Performed by: NURSE PRACTITIONER

## 2022-04-21 PROCEDURE — U0002 COVID-19 LAB TEST NON-CDC: HCPCS | Performed by: NURSE PRACTITIONER

## 2022-04-21 RX ORDER — DICYCLOMINE HCL 20 MG
20 TABLET ORAL 4 TIMES DAILY PRN
Qty: 30 TABLET | Refills: 0 | Status: SHIPPED | OUTPATIENT
Start: 2022-04-21 | End: 2022-05-06

## 2022-04-21 RX ORDER — IOHEXOL 350 MG/ML
100 INJECTION, SOLUTION INTRAVENOUS
Status: COMPLETED | OUTPATIENT
Start: 2022-04-21 | End: 2022-04-21

## 2022-04-21 RX ORDER — SODIUM CHLORIDE 9 MG/ML
1000 INJECTION, SOLUTION INTRAVENOUS ONCE
Status: DISCONTINUED | OUTPATIENT
Start: 2022-04-21 | End: 2022-04-21

## 2022-04-21 NOTE — TELEPHONE ENCOUNTER
ATE HAM EASTER SUNDAY, WOKE UP MONDAY & HAD 5-6 SOLID STOOLS, NOW HER STOOLS ARE LOOSE & SHE IS HAVING RECTAL LEAKAGE, NOT SURE IF SHE HAS INTESTINAL INFECTION? NO FEVER, HER FRIEND THAT ATE THE HAM WOKE UP NEXT DAY WITH VOMITTING, SO NOT SURE IF THIS IS FOOD RELATED?  CALL PT

## 2022-04-21 NOTE — ED INITIAL ASSESSMENT (HPI)
Pt here w/ loose BM's onset Nabeel night. Pt states normal BM Monday but since Tuesday having frequent looser consistency and a constant small \"leakage. \" States some bloating and some abd cramping and some bilateral pain to back

## 2022-04-21 NOTE — TELEPHONE ENCOUNTER
Patient said that she has a temp of 99.3 and she is having leakage of stool with mucous. She denies nausea or vomiting. She said had diarrhea 3-4x daily that started Tuesday.

## 2022-06-06 ENCOUNTER — HOSPITAL ENCOUNTER (OUTPATIENT)
Age: 67
Discharge: HOME | End: 2022-06-06
Payer: MEDICARE

## 2022-06-06 ENCOUNTER — TELEPHONE (OUTPATIENT)
Dept: FAMILY MEDICINE CLINIC | Facility: CLINIC | Age: 67
End: 2022-06-06

## 2022-06-06 VITALS
TEMPERATURE: 99 F | DIASTOLIC BLOOD PRESSURE: 47 MMHG | RESPIRATION RATE: 18 BRPM | BODY MASS INDEX: 33.82 KG/M2 | HEIGHT: 67.5 IN | HEART RATE: 79 BPM | WEIGHT: 218 LBS | OXYGEN SATURATION: 97 % | SYSTOLIC BLOOD PRESSURE: 148 MMHG

## 2022-06-06 DIAGNOSIS — Z11.52 ENCOUNTER FOR SCREENING FOR COVID-19: Primary | ICD-10-CM

## 2022-06-06 DIAGNOSIS — B34.9 VIRAL SYNDROME: ICD-10-CM

## 2022-06-06 DIAGNOSIS — U07.1 COVID-19 VIRUS INFECTION: ICD-10-CM

## 2022-06-06 LAB — SARS-COV-2 RNA RESP QL NAA+PROBE: DETECTED

## 2022-06-06 PROCEDURE — U0002 COVID-19 LAB TEST NON-CDC: HCPCS | Performed by: NURSE PRACTITIONER

## 2022-06-06 PROCEDURE — 99213 OFFICE O/P EST LOW 20 MIN: CPT | Performed by: NURSE PRACTITIONER

## 2022-06-06 RX ORDER — FLUTICASONE PROPIONATE 50 MCG
2 SPRAY, SUSPENSION (ML) NASAL DAILY
Qty: 16 G | Refills: 0 | Status: SHIPPED | OUTPATIENT
Start: 2022-06-06

## 2022-06-06 NOTE — TELEPHONE ENCOUNTER
Patient said that the NP offered her the antibodies and she refused them. She is taking Mucinex and Vitamin C and  Increased the vitamin D 50,000 daily. She said she started with symptoms last night, headache, body aches and slight congestion. She took Tylenol for headaches but stopped because it gave her diarrhea. Is the Vitamin D dose appropriate? Her friend and Victor M Vyas advised her to take that dose.

## 2022-06-06 NOTE — ED INITIAL ASSESSMENT (HPI)
Patient started with covid symptoms yesterday. She has a sore throat, ear pain, fatigue, and a low grade temp. She tested at home this morning and was positive for covid. She is here to discuss potential treatment options.

## 2022-06-06 NOTE — TELEPHONE ENCOUNTER
PT. WENT TO URGENT CARE TODAY AND TESTED POSITIVE FOR COVID. SHE IS ASKING IF ANYTHING ELSE SHE SHOULD BE DOING?

## 2022-06-07 ENCOUNTER — TELEPHONE (OUTPATIENT)
Dept: FAMILY MEDICINE CLINIC | Facility: CLINIC | Age: 67
End: 2022-06-07

## 2022-06-07 NOTE — TELEPHONE ENCOUNTER
Sophia Ronn needs a note to return to work on Erin 15. 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

## 2022-06-28 ENCOUNTER — OFFICE VISIT (OUTPATIENT)
Dept: FAMILY MEDICINE CLINIC | Facility: CLINIC | Age: 67
End: 2022-06-28
Payer: MEDICARE

## 2022-06-28 VITALS
HEIGHT: 67.5 IN | TEMPERATURE: 97 F | HEART RATE: 70 BPM | SYSTOLIC BLOOD PRESSURE: 134 MMHG | BODY MASS INDEX: 33.51 KG/M2 | RESPIRATION RATE: 16 BRPM | OXYGEN SATURATION: 95 % | WEIGHT: 216 LBS | DIASTOLIC BLOOD PRESSURE: 76 MMHG

## 2022-06-28 DIAGNOSIS — G47.33 OSA (OBSTRUCTIVE SLEEP APNEA): Primary | ICD-10-CM

## 2022-06-28 PROCEDURE — 99214 OFFICE O/P EST MOD 30 MIN: CPT | Performed by: NURSE PRACTITIONER

## 2022-06-28 RX ORDER — ESTRADIOL 0.1 MG/G
CREAM VAGINAL
COMMUNITY
Start: 2022-02-17

## 2022-06-28 RX ORDER — PERPHENAZINE 2 MG
TABLET ORAL
COMMUNITY
Start: 2021-08-31 | End: 2022-06-28

## 2022-06-28 RX ORDER — KELP 150 MCG
TABLET ORAL
COMMUNITY
Start: 2021-08-31 | End: 2022-06-28 | Stop reason: ALTCHOICE

## 2022-07-21 ENCOUNTER — OFFICE VISIT (OUTPATIENT)
Dept: FAMILY MEDICINE CLINIC | Facility: CLINIC | Age: 67
End: 2022-07-21
Payer: MEDICARE

## 2022-07-21 ENCOUNTER — LABORATORY ENCOUNTER (OUTPATIENT)
Dept: LAB | Age: 67
End: 2022-07-21
Attending: INTERNAL MEDICINE
Payer: MEDICARE

## 2022-07-21 VITALS
HEIGHT: 67.5 IN | WEIGHT: 216.25 LBS | SYSTOLIC BLOOD PRESSURE: 134 MMHG | RESPIRATION RATE: 16 BRPM | BODY MASS INDEX: 33.55 KG/M2 | TEMPERATURE: 98 F | HEART RATE: 68 BPM | OXYGEN SATURATION: 96 % | DIASTOLIC BLOOD PRESSURE: 72 MMHG

## 2022-07-21 DIAGNOSIS — E78.00 PURE HYPERCHOLESTEROLEMIA: ICD-10-CM

## 2022-07-21 DIAGNOSIS — Z12.31 VISIT FOR SCREENING MAMMOGRAM: ICD-10-CM

## 2022-07-21 DIAGNOSIS — G47.33 OSA (OBSTRUCTIVE SLEEP APNEA): ICD-10-CM

## 2022-07-21 DIAGNOSIS — G25.81 RESTLESS LEGS SYNDROME (RLS): ICD-10-CM

## 2022-07-21 DIAGNOSIS — I10 HTN (HYPERTENSION), BENIGN: ICD-10-CM

## 2022-07-21 DIAGNOSIS — Z00.00 ENCOUNTER FOR ANNUAL HEALTH EXAMINATION: ICD-10-CM

## 2022-07-21 DIAGNOSIS — N32.81 URGENCY-FREQUENCY SYNDROME: Primary | ICD-10-CM

## 2022-07-21 LAB
ALBUMIN SERPL-MCNC: 4 G/DL (ref 3.4–5)
ALBUMIN/GLOB SERPL: 1.1 {RATIO} (ref 1–2)
ALP LIVER SERPL-CCNC: 68 U/L
ALT SERPL-CCNC: 29 U/L
ANION GAP SERPL CALC-SCNC: 6 MMOL/L (ref 0–18)
AST SERPL-CCNC: 24 U/L (ref 15–37)
BASOPHILS # BLD AUTO: 0.06 X10(3) UL (ref 0–0.2)
BASOPHILS NFR BLD AUTO: 1 %
BILIRUB SERPL-MCNC: 0.6 MG/DL (ref 0.1–2)
BUN BLD-MCNC: 14 MG/DL (ref 7–18)
CALCIUM BLD-MCNC: 8.9 MG/DL (ref 8.5–10.1)
CHLORIDE SERPL-SCNC: 109 MMOL/L (ref 98–112)
CHOLEST SERPL-MCNC: 180 MG/DL (ref ?–200)
CO2 SERPL-SCNC: 24 MMOL/L (ref 21–32)
CREAT BLD-MCNC: 0.7 MG/DL
EOSINOPHIL # BLD AUTO: 0.2 X10(3) UL (ref 0–0.7)
EOSINOPHIL NFR BLD AUTO: 3.3 %
ERYTHROCYTE [DISTWIDTH] IN BLOOD BY AUTOMATED COUNT: 13.2 %
FASTING PATIENT LIPID ANSWER: YES
FASTING STATUS PATIENT QL REPORTED: YES
GLOBULIN PLAS-MCNC: 3.5 G/DL (ref 2.8–4.4)
GLUCOSE BLD-MCNC: 101 MG/DL (ref 70–99)
HCT VFR BLD AUTO: 42.9 %
HDLC SERPL-MCNC: 59 MG/DL (ref 40–59)
HGB BLD-MCNC: 14 G/DL
IMM GRANULOCYTES # BLD AUTO: 0.02 X10(3) UL (ref 0–1)
IMM GRANULOCYTES NFR BLD: 0.3 %
LDLC SERPL CALC-MCNC: 102 MG/DL (ref ?–100)
LYMPHOCYTES # BLD AUTO: 1.28 X10(3) UL (ref 1–4)
LYMPHOCYTES NFR BLD AUTO: 20.9 %
MCH RBC QN AUTO: 30.4 PG (ref 26–34)
MCHC RBC AUTO-ENTMCNC: 32.6 G/DL (ref 31–37)
MCV RBC AUTO: 93.1 FL
MONOCYTES # BLD AUTO: 0.84 X10(3) UL (ref 0.1–1)
MONOCYTES NFR BLD AUTO: 13.7 %
NEUTROPHILS # BLD AUTO: 3.71 X10 (3) UL (ref 1.5–7.7)
NEUTROPHILS # BLD AUTO: 3.71 X10(3) UL (ref 1.5–7.7)
NEUTROPHILS NFR BLD AUTO: 60.8 %
NONHDLC SERPL-MCNC: 121 MG/DL (ref ?–130)
OSMOLALITY SERPL CALC.SUM OF ELEC: 289 MOSM/KG (ref 275–295)
PLATELET # BLD AUTO: 304 10(3)UL (ref 150–450)
POTASSIUM SERPL-SCNC: 4.1 MMOL/L (ref 3.5–5.1)
PROT SERPL-MCNC: 7.5 G/DL (ref 6.4–8.2)
RBC # BLD AUTO: 4.61 X10(6)UL
SODIUM SERPL-SCNC: 139 MMOL/L (ref 136–145)
TRIGL SERPL-MCNC: 105 MG/DL (ref 30–149)
VLDLC SERPL CALC-MCNC: 18 MG/DL (ref 0–30)
WBC # BLD AUTO: 6.1 X10(3) UL (ref 4–11)

## 2022-07-21 PROCEDURE — 80061 LIPID PANEL: CPT

## 2022-07-21 PROCEDURE — 80053 COMPREHEN METABOLIC PANEL: CPT

## 2022-07-21 PROCEDURE — 36415 COLL VENOUS BLD VENIPUNCTURE: CPT

## 2022-07-21 PROCEDURE — 85025 COMPLETE CBC W/AUTO DIFF WBC: CPT

## 2022-07-22 ENCOUNTER — PATIENT MESSAGE (OUTPATIENT)
Dept: FAMILY MEDICINE CLINIC | Facility: CLINIC | Age: 67
End: 2022-07-22

## 2022-07-22 NOTE — TELEPHONE ENCOUNTER
From: Amy Suarez  To:  Vivian Edmonds MD  Sent: 7/22/2022 7:32 AM CDT  Subject: Medications     I need refills on both of my medications   Simvastatin 20mg and Losartan 25mg   I was pleased with my test results I take it you want me to stay on all medications

## 2022-07-23 RX ORDER — LOSARTAN POTASSIUM 25 MG/1
25 TABLET ORAL DAILY
Qty: 90 TABLET | Refills: 1 | Status: SHIPPED | OUTPATIENT
Start: 2022-07-23 | End: 2023-01-19

## 2022-07-23 RX ORDER — SIMVASTATIN 20 MG
20 TABLET ORAL NIGHTLY
Qty: 90 TABLET | Refills: 1 | Status: SHIPPED | OUTPATIENT
Start: 2022-07-23 | End: 2023-01-19

## 2022-07-23 NOTE — TELEPHONE ENCOUNTER
Hypertension Medications Protocol Passed 07/22/2022 03:23 PM   Protocol Details  CMP or BMP in past 12 months    Last serum creatinine< 2.0    Appointment in past 6 or next 3 months      Cholesterol Medication Protocol Passed 07/22/2022 03:23 PM   Protocol Details  ALT < 80    ALT resulted within past year    Lipid panel within past 12 months    Appointment within past 12 or next 3 months        Last office visit:  07/21/22  Last lipid:  07/21/22  Last cmp:  07/21/22  BP Readings from Last 3 Encounters:  07/21/22 : 134/72  06/28/22 : 134/76  06/06/22 : 148/47    Simvastatin:  04/13/22  #90, no refills  Losartan:  04/13/22  #90, no refills    No future appts with pcp.

## 2022-08-27 ENCOUNTER — HOSPITAL ENCOUNTER (OUTPATIENT)
Dept: MAMMOGRAPHY | Age: 67
Discharge: HOME | End: 2022-08-27
Attending: INTERNAL MEDICINE
Payer: MEDICARE

## 2022-08-27 DIAGNOSIS — Z12.31 SCREENING MAMMOGRAM, ENCOUNTER FOR: ICD-10-CM

## 2022-08-27 PROCEDURE — 77067 SCR MAMMO BI INCL CAD: CPT | Performed by: INTERNAL MEDICINE

## 2022-08-27 PROCEDURE — 77063 BREAST TOMOSYNTHESIS BI: CPT | Performed by: INTERNAL MEDICINE

## 2022-10-22 ENCOUNTER — OFFICE VISIT (OUTPATIENT)
Dept: FAMILY MEDICINE CLINIC | Facility: CLINIC | Age: 67
End: 2022-10-22
Payer: MEDICARE

## 2022-10-22 VITALS
OXYGEN SATURATION: 96 % | DIASTOLIC BLOOD PRESSURE: 80 MMHG | WEIGHT: 218.38 LBS | HEART RATE: 71 BPM | SYSTOLIC BLOOD PRESSURE: 136 MMHG | BODY MASS INDEX: 34 KG/M2 | RESPIRATION RATE: 18 BRPM | TEMPERATURE: 98 F

## 2022-10-22 DIAGNOSIS — R06.02 SOB (SHORTNESS OF BREATH) ON EXERTION: Primary | ICD-10-CM

## 2022-10-22 LAB
T4 FREE SERPL-MCNC: 1.2 NG/DL (ref 0.8–1.7)
TSI SER-ACNC: 0.64 MIU/ML (ref 0.36–3.74)

## 2022-10-22 PROCEDURE — 90662 IIV NO PRSV INCREASED AG IM: CPT | Performed by: INTERNAL MEDICINE

## 2022-10-22 PROCEDURE — 99214 OFFICE O/P EST MOD 30 MIN: CPT | Performed by: INTERNAL MEDICINE

## 2022-10-22 PROCEDURE — 84439 ASSAY OF FREE THYROXINE: CPT | Performed by: INTERNAL MEDICINE

## 2022-10-22 PROCEDURE — 84443 ASSAY THYROID STIM HORMONE: CPT | Performed by: INTERNAL MEDICINE

## 2022-10-22 PROCEDURE — G0008 ADMIN INFLUENZA VIRUS VAC: HCPCS | Performed by: INTERNAL MEDICINE

## 2022-12-21 ENCOUNTER — OFFICE VISIT (OUTPATIENT)
Dept: FAMILY MEDICINE CLINIC | Facility: CLINIC | Age: 67
End: 2022-12-21
Payer: MEDICARE

## 2022-12-21 VITALS
WEIGHT: 220 LBS | HEART RATE: 64 BPM | RESPIRATION RATE: 12 BRPM | SYSTOLIC BLOOD PRESSURE: 138 MMHG | OXYGEN SATURATION: 98 % | DIASTOLIC BLOOD PRESSURE: 88 MMHG | BODY MASS INDEX: 34 KG/M2 | TEMPERATURE: 98 F

## 2022-12-21 DIAGNOSIS — R05.1 ACUTE COUGH: Primary | ICD-10-CM

## 2022-12-21 PROCEDURE — 87637 SARSCOV2&INF A&B&RSV AMP PRB: CPT | Performed by: INTERNAL MEDICINE

## 2022-12-21 PROCEDURE — 99214 OFFICE O/P EST MOD 30 MIN: CPT | Performed by: INTERNAL MEDICINE

## 2022-12-21 RX ORDER — AMOXICILLIN AND CLAVULANATE POTASSIUM 500; 125 MG/1; MG/1
1 TABLET, FILM COATED ORAL 3 TIMES DAILY
Qty: 30 TABLET | Refills: 0 | Status: SHIPPED | OUTPATIENT
Start: 2022-12-21 | End: 2022-12-31

## 2022-12-22 ENCOUNTER — TELEPHONE (OUTPATIENT)
Dept: FAMILY MEDICINE CLINIC | Facility: CLINIC | Age: 67
End: 2022-12-22

## 2022-12-22 LAB
FLUAV + FLUBV RNA SPEC NAA+PROBE: DETECTED
FLUAV + FLUBV RNA SPEC NAA+PROBE: NOT DETECTED
RSV RNA SPEC NAA+PROBE: NOT DETECTED
SARS-COV-2 RNA RESP QL NAA+PROBE: NOT DETECTED

## 2022-12-27 ENCOUNTER — OFFICE VISIT (OUTPATIENT)
Dept: FAMILY MEDICINE CLINIC | Facility: CLINIC | Age: 67
End: 2022-12-27
Payer: MEDICARE

## 2022-12-27 VITALS
HEART RATE: 83 BPM | WEIGHT: 223 LBS | DIASTOLIC BLOOD PRESSURE: 70 MMHG | HEIGHT: 67.5 IN | BODY MASS INDEX: 34.59 KG/M2 | TEMPERATURE: 98 F | SYSTOLIC BLOOD PRESSURE: 130 MMHG | OXYGEN SATURATION: 95 % | RESPIRATION RATE: 18 BRPM

## 2022-12-27 DIAGNOSIS — G47.33 OSA (OBSTRUCTIVE SLEEP APNEA): Primary | ICD-10-CM

## 2022-12-27 PROCEDURE — 99214 OFFICE O/P EST MOD 30 MIN: CPT | Performed by: NURSE PRACTITIONER

## 2022-12-27 NOTE — PATIENT INSTRUCTIONS
Continue sleep therapy. Follow-up in 6 months - sooner if needed. Advised if still with sleep apnea and not using CPAP has a  7 fold increase in risk of heart attack, stroke, abnormal heart rhythm  and death,  increased risk of driving accidents. Advised to refrain from driving when sleepy. COMPLIANCE is required by insurance for 4 hours a night most nights of the week. Recommend weight loss, and maintain and optimal BMI with Exercise 30 minutes most days of the week to target heart rate . Advised patient to change filters,masks,hoses  and tubes and equiptment on a  regular schedule. Filters and seals shall be changed every 1 month,  Hoses every 3 months,   CPAP mask and humidifier  chamber changed every 6 month  with the Durable medical equipment provider.

## 2023-01-30 ENCOUNTER — OFFICE VISIT (OUTPATIENT)
Dept: FAMILY MEDICINE CLINIC | Facility: CLINIC | Age: 68
End: 2023-01-30
Payer: MEDICARE

## 2023-01-30 VITALS
SYSTOLIC BLOOD PRESSURE: 130 MMHG | BODY MASS INDEX: 35 KG/M2 | DIASTOLIC BLOOD PRESSURE: 70 MMHG | OXYGEN SATURATION: 98 % | WEIGHT: 224.38 LBS | TEMPERATURE: 98 F | RESPIRATION RATE: 16 BRPM | HEART RATE: 87 BPM

## 2023-01-30 DIAGNOSIS — M25.551 RIGHT HIP PAIN: Primary | ICD-10-CM

## 2023-01-30 PROCEDURE — 99213 OFFICE O/P EST LOW 20 MIN: CPT | Performed by: INTERNAL MEDICINE

## 2023-01-30 RX ORDER — NITROFURANTOIN 25; 75 MG/1; MG/1
100 CAPSULE ORAL ONCE
COMMUNITY
Start: 2022-12-16

## 2023-01-30 RX ORDER — CYCLOBENZAPRINE HCL 10 MG
10 TABLET ORAL 3 TIMES DAILY
Qty: 30 TABLET | Refills: 0 | Status: SHIPPED | OUTPATIENT
Start: 2023-01-30 | End: 2023-01-31

## 2023-01-31 ENCOUNTER — HOSPITAL ENCOUNTER (OUTPATIENT)
Dept: GENERAL RADIOLOGY | Age: 68
Discharge: HOME | End: 2023-01-31
Attending: INTERNAL MEDICINE
Payer: MEDICARE

## 2023-01-31 DIAGNOSIS — M25.551 RIGHT HIP PAIN: ICD-10-CM

## 2023-01-31 PROCEDURE — 73502 X-RAY EXAM HIP UNI 2-3 VIEWS: CPT | Performed by: INTERNAL MEDICINE

## 2023-01-31 RX ORDER — CYCLOBENZAPRINE HCL 10 MG
10 TABLET ORAL 3 TIMES DAILY
Qty: 15 TABLET | Refills: 0 | Status: SHIPPED | OUTPATIENT
Start: 2023-01-31 | End: 2023-02-20

## 2023-02-01 ENCOUNTER — PATIENT MESSAGE (OUTPATIENT)
Dept: FAMILY MEDICINE CLINIC | Facility: CLINIC | Age: 68
End: 2023-02-01

## 2023-02-01 RX ORDER — LOSARTAN POTASSIUM 25 MG/1
25 TABLET ORAL DAILY
Qty: 90 TABLET | Refills: 1 | Status: SHIPPED | OUTPATIENT
Start: 2023-02-01 | End: 2023-07-31

## 2023-02-24 ENCOUNTER — TELEPHONE (OUTPATIENT)
Dept: FAMILY MEDICINE CLINIC | Facility: CLINIC | Age: 68
End: 2023-02-24

## 2023-02-24 ENCOUNTER — OFFICE VISIT (OUTPATIENT)
Dept: FAMILY MEDICINE CLINIC | Facility: CLINIC | Age: 68
End: 2023-02-24
Payer: MEDICARE

## 2023-02-24 VITALS
OXYGEN SATURATION: 97 % | SYSTOLIC BLOOD PRESSURE: 142 MMHG | BODY MASS INDEX: 34.69 KG/M2 | WEIGHT: 221 LBS | TEMPERATURE: 98 F | HEIGHT: 67 IN | RESPIRATION RATE: 18 BRPM | DIASTOLIC BLOOD PRESSURE: 86 MMHG | HEART RATE: 76 BPM

## 2023-02-24 DIAGNOSIS — R39.9 UTI SYMPTOMS: Primary | ICD-10-CM

## 2023-02-24 LAB
BILIRUBIN: NEGATIVE
GLUCOSE (URINE DIPSTICK): NEGATIVE MG/DL
KETONES (URINE DIPSTICK): NEGATIVE MG/DL
MULTISTIX LOT#: ABNORMAL NUMERIC
NITRITE, URINE: NEGATIVE
PH, URINE: 7 (ref 4.5–8)
PROTEIN (URINE DIPSTICK): NEGATIVE MG/DL
SPECIFIC GRAVITY: 1.01 (ref 1–1.03)
URINE-COLOR: YELLOW
UROBILINOGEN,SEMI-QN: 0.2 MG/DL (ref 0–1.9)

## 2023-02-24 PROCEDURE — 87088 URINE BACTERIA CULTURE: CPT | Performed by: PHYSICIAN ASSISTANT

## 2023-02-24 PROCEDURE — 99213 OFFICE O/P EST LOW 20 MIN: CPT | Performed by: PHYSICIAN ASSISTANT

## 2023-02-24 PROCEDURE — 87086 URINE CULTURE/COLONY COUNT: CPT | Performed by: PHYSICIAN ASSISTANT

## 2023-02-24 PROCEDURE — 81003 URINALYSIS AUTO W/O SCOPE: CPT | Performed by: PHYSICIAN ASSISTANT

## 2023-02-24 PROCEDURE — 87186 SC STD MICRODIL/AGAR DIL: CPT | Performed by: PHYSICIAN ASSISTANT

## 2023-02-24 RX ORDER — NITROFURANTOIN 25; 75 MG/1; MG/1
100 CAPSULE ORAL 2 TIMES DAILY
Qty: 14 CAPSULE | Refills: 0 | Status: SHIPPED | OUTPATIENT
Start: 2023-02-24 | End: 2023-03-03

## 2023-02-24 NOTE — TELEPHONE ENCOUNTER
Pt has burning at the end of urination - discoloration on pad. Last uti was \"a long time ago\"    Unable to go to [facility] tonight due to watching great-grandkids    Please advise?  8am with Jessi Koch? Treat and still be seen? Discussed with Dr. Denver Paling - needs to be seen. Spoke again with pt - appt made   Future Appointments   Date Time Provider Karl Faith   2/25/2023  8:00 AM DUNCAN Larry [facility] [place]   6/26/2023  8:00 AM Solange Davis APRN [facility] [place] [facility] [place]   8/15/2023  8:15 AM Constantino Solo MD [facility] [address]     If symptoms worsen will go to [facility] or [facility] tonight. Pt will call office in morning and cancel if sees provider tonight.

## 2023-03-01 RX ORDER — SIMVASTATIN 20 MG
20 TABLET ORAL NIGHTLY
Qty: 90 TABLET | Refills: 3 | Status: SHIPPED | OUTPATIENT
Start: 2023-03-01 | End: 2023-05-30

## 2023-03-07 ENCOUNTER — OFFICE VISIT (OUTPATIENT)
Dept: FAMILY MEDICINE CLINIC | Facility: CLINIC | Age: 68
End: 2023-03-07
Payer: MEDICARE

## 2023-03-07 VITALS
DIASTOLIC BLOOD PRESSURE: 90 MMHG | OXYGEN SATURATION: 97 % | HEART RATE: 77 BPM | TEMPERATURE: 98 F | RESPIRATION RATE: 18 BRPM | BODY MASS INDEX: 34.03 KG/M2 | HEIGHT: 67.5 IN | SYSTOLIC BLOOD PRESSURE: 133 MMHG | WEIGHT: 219.38 LBS

## 2023-03-07 DIAGNOSIS — I10 PRIMARY HYPERTENSION: Primary | ICD-10-CM

## 2023-03-07 PROCEDURE — 99213 OFFICE O/P EST LOW 20 MIN: CPT | Performed by: INTERNAL MEDICINE

## 2023-03-08 PROBLEM — I10 PRIMARY HYPERTENSION: Status: ACTIVE | Noted: 2023-03-08

## 2023-03-15 ENCOUNTER — TELEPHONE (OUTPATIENT)
Dept: FAMILY MEDICINE CLINIC | Facility: CLINIC | Age: 68
End: 2023-03-15

## 2023-03-15 NOTE — TELEPHONE ENCOUNTER
Patient said that whatever she eats \"comes right out\". She denies having any nausea, vomiting or fever. She said that the pharmacist told her to take Imodium. She said she also got crackers, bananas, Pedialyte. Any other suggestions?

## 2023-03-15 NOTE — TELEPHONE ENCOUNTER
DIARRHEA SINCE Monday. NEGATIVE COVID. WANTS TO KNOW WHAT OTC MED SHE CAN TAKE. UNABLE TO KEEP ANYTHING DOWN.

## 2023-03-17 ENCOUNTER — HOSPITAL ENCOUNTER (EMERGENCY)
Age: 68
Discharge: HOME | End: 2023-03-17
Attending: EMERGENCY MEDICINE
Payer: MEDICARE

## 2023-03-17 ENCOUNTER — HOSPITAL ENCOUNTER (OUTPATIENT)
Age: 68
Discharge: ANOTHER HOSPITAL | End: 2023-03-17
Payer: MEDICARE

## 2023-03-17 ENCOUNTER — APPOINTMENT (OUTPATIENT)
Dept: GENERAL RADIOLOGY | Age: 68
End: 2023-03-17
Attending: EMERGENCY MEDICINE
Payer: MEDICARE

## 2023-03-17 VITALS
BODY MASS INDEX: 33.59 KG/M2 | HEIGHT: 67 IN | RESPIRATION RATE: 18 BRPM | OXYGEN SATURATION: 97 % | TEMPERATURE: 98 F | SYSTOLIC BLOOD PRESSURE: 150 MMHG | DIASTOLIC BLOOD PRESSURE: 62 MMHG | WEIGHT: 214 LBS | HEART RATE: 72 BPM

## 2023-03-17 VITALS
OXYGEN SATURATION: 97 % | TEMPERATURE: 98 F | DIASTOLIC BLOOD PRESSURE: 82 MMHG | HEART RATE: 80 BPM | SYSTOLIC BLOOD PRESSURE: 164 MMHG | RESPIRATION RATE: 18 BRPM

## 2023-03-17 DIAGNOSIS — R06.02 SOB (SHORTNESS OF BREATH): ICD-10-CM

## 2023-03-17 DIAGNOSIS — R19.7 DIARRHEA, UNSPECIFIED TYPE: Primary | ICD-10-CM

## 2023-03-17 DIAGNOSIS — R06.00 DYSPNEA, UNSPECIFIED TYPE: ICD-10-CM

## 2023-03-17 DIAGNOSIS — R10.13 EPIGASTRIC PAIN: Primary | ICD-10-CM

## 2023-03-17 LAB
ALBUMIN SERPL-MCNC: 3.6 G/DL (ref 3.4–5)
ALBUMIN/GLOB SERPL: 1 {RATIO} (ref 1–2)
ALP LIVER SERPL-CCNC: 77 U/L
ALT SERPL-CCNC: 33 U/L
ANION GAP SERPL CALC-SCNC: 7 MMOL/L (ref 0–18)
AST SERPL-CCNC: 25 U/L (ref 15–37)
BASOPHILS # BLD AUTO: 0.03 X10(3) UL (ref 0–0.2)
BASOPHILS NFR BLD AUTO: 0.4 %
BILIRUB SERPL-MCNC: 0.2 MG/DL (ref 0.1–2)
BILIRUB UR QL STRIP.AUTO: NEGATIVE
BUN BLD-MCNC: 14 MG/DL (ref 7–18)
CALCIUM BLD-MCNC: 8.9 MG/DL (ref 8.5–10.1)
CHLORIDE SERPL-SCNC: 108 MMOL/L (ref 98–112)
CLARITY UR REFRACT.AUTO: CLEAR
CO2 SERPL-SCNC: 24 MMOL/L (ref 21–32)
COLOR UR AUTO: YELLOW
CREAT BLD-MCNC: 0.71 MG/DL
D DIMER PPP FEU-MCNC: 0.63 UG/ML FEU (ref ?–0.68)
EOSINOPHIL # BLD AUTO: 0.18 X10(3) UL (ref 0–0.7)
EOSINOPHIL NFR BLD AUTO: 2.7 %
ERYTHROCYTE [DISTWIDTH] IN BLOOD BY AUTOMATED COUNT: 12.8 %
GFR SERPLBLD BASED ON 1.73 SQ M-ARVRAT: 93 ML/MIN/1.73M2 (ref 60–?)
GLOBULIN PLAS-MCNC: 3.5 G/DL (ref 2.8–4.4)
GLUCOSE BLD-MCNC: 95 MG/DL (ref 70–99)
GLUCOSE UR STRIP.AUTO-MCNC: NEGATIVE MG/DL
HCT VFR BLD AUTO: 39.9 %
HGB BLD-MCNC: 13.2 G/DL
IMM GRANULOCYTES # BLD AUTO: 0.02 X10(3) UL (ref 0–1)
IMM GRANULOCYTES NFR BLD: 0.3 %
KETONES UR STRIP.AUTO-MCNC: NEGATIVE MG/DL
LYMPHOCYTES # BLD AUTO: 1.61 X10(3) UL (ref 1–4)
LYMPHOCYTES NFR BLD AUTO: 24.1 %
MCH RBC QN AUTO: 29.6 PG (ref 26–34)
MCHC RBC AUTO-ENTMCNC: 33.1 G/DL (ref 31–37)
MCV RBC AUTO: 89.5 FL
MONOCYTES # BLD AUTO: 1.29 X10(3) UL (ref 0.1–1)
MONOCYTES NFR BLD AUTO: 19.3 %
NEUTROPHILS # BLD AUTO: 3.55 X10 (3) UL (ref 1.5–7.7)
NEUTROPHILS # BLD AUTO: 3.55 X10(3) UL (ref 1.5–7.7)
NEUTROPHILS NFR BLD AUTO: 53.2 %
NITRITE UR QL STRIP.AUTO: NEGATIVE
OSMOLALITY SERPL CALC.SUM OF ELEC: 288 MOSM/KG (ref 275–295)
PH UR STRIP.AUTO: 5.5 [PH] (ref 5–8)
PLATELET # BLD AUTO: 342 10(3)UL (ref 150–450)
POTASSIUM SERPL-SCNC: 3.6 MMOL/L (ref 3.5–5.1)
PROT SERPL-MCNC: 7.1 G/DL (ref 6.4–8.2)
PROT UR STRIP.AUTO-MCNC: NEGATIVE MG/DL
RBC # BLD AUTO: 4.46 X10(6)UL
SODIUM SERPL-SCNC: 139 MMOL/L (ref 136–145)
SP GR UR STRIP.AUTO: 1.01 (ref 1–1.03)
TROPONIN I HIGH SENSITIVITY: 7 NG/L
UROBILINOGEN UR STRIP.AUTO-MCNC: 0.2 MG/DL
WBC # BLD AUTO: 6.7 X10(3) UL (ref 4–11)

## 2023-03-17 PROCEDURE — 96360 HYDRATION IV INFUSION INIT: CPT

## 2023-03-17 PROCEDURE — 99285 EMERGENCY DEPT VISIT HI MDM: CPT

## 2023-03-17 PROCEDURE — 99284 EMERGENCY DEPT VISIT MOD MDM: CPT

## 2023-03-17 PROCEDURE — 71046 X-RAY EXAM CHEST 2 VIEWS: CPT | Performed by: EMERGENCY MEDICINE

## 2023-03-17 PROCEDURE — 87086 URINE CULTURE/COLONY COUNT: CPT | Performed by: EMERGENCY MEDICINE

## 2023-03-17 PROCEDURE — 85025 COMPLETE CBC W/AUTO DIFF WBC: CPT | Performed by: EMERGENCY MEDICINE

## 2023-03-17 PROCEDURE — 84484 ASSAY OF TROPONIN QUANT: CPT | Performed by: EMERGENCY MEDICINE

## 2023-03-17 PROCEDURE — 99205 OFFICE O/P NEW HI 60 MIN: CPT | Performed by: PHYSICIAN ASSISTANT

## 2023-03-17 PROCEDURE — 81015 MICROSCOPIC EXAM OF URINE: CPT | Performed by: EMERGENCY MEDICINE

## 2023-03-17 PROCEDURE — 93010 ELECTROCARDIOGRAM REPORT: CPT

## 2023-03-17 PROCEDURE — 85379 FIBRIN DEGRADATION QUANT: CPT | Performed by: EMERGENCY MEDICINE

## 2023-03-17 PROCEDURE — 93005 ELECTROCARDIOGRAM TRACING: CPT

## 2023-03-17 PROCEDURE — 80053 COMPREHEN METABOLIC PANEL: CPT | Performed by: EMERGENCY MEDICINE

## 2023-03-17 PROCEDURE — 81001 URINALYSIS AUTO W/SCOPE: CPT | Performed by: EMERGENCY MEDICINE

## 2023-03-18 LAB
ATRIAL RATE: 78 BPM
P AXIS: 40 DEGREES
P-R INTERVAL: 146 MS
Q-T INTERVAL: 420 MS
QRS DURATION: 84 MS
QTC CALCULATION (BEZET): 478 MS
R AXIS: 13 DEGREES
T AXIS: 48 DEGREES
VENTRICULAR RATE: 78 BPM

## 2023-03-18 NOTE — ED INITIAL ASSESSMENT (HPI)
Pt to ER for evaluation of dyspnea with exertion x 1 week, intermittent abd pain that comes and goes with her episodes of diarrhea, and she feels lightheaded at times. She denies blood in stool. Resps easy at this time. Pt speaks to me in complete sentences. No chest pain.

## 2023-03-18 NOTE — DISCHARGE INSTRUCTIONS
Patient presents to ED via EMS for evaluation of chest pain, bilateral leg pain and decrease urination. Patient O2 dropped to 90% per EMS and was started on 2L NC. 2L NC continued in ED. Family at bedside. Patient non verbal at baseline however able to follow commands.    Please report to the [facility] emergency department for further investigation.

## 2023-03-22 ENCOUNTER — OFFICE VISIT (OUTPATIENT)
Dept: FAMILY MEDICINE CLINIC | Facility: CLINIC | Age: 68
End: 2023-03-22
Payer: MEDICARE

## 2023-03-22 VITALS
SYSTOLIC BLOOD PRESSURE: 142 MMHG | HEART RATE: 72 BPM | RESPIRATION RATE: 18 BRPM | OXYGEN SATURATION: 98 % | TEMPERATURE: 98 F | DIASTOLIC BLOOD PRESSURE: 76 MMHG

## 2023-03-22 DIAGNOSIS — R06.00 DYSPNEA, UNSPECIFIED TYPE: Primary | ICD-10-CM

## 2023-03-22 PROCEDURE — 99214 OFFICE O/P EST MOD 30 MIN: CPT | Performed by: INTERNAL MEDICINE

## 2023-05-16 ENCOUNTER — TELEPHONE (OUTPATIENT)
Dept: FAMILY MEDICINE CLINIC | Facility: CLINIC | Age: 68
End: 2023-05-16

## 2023-05-16 NOTE — TELEPHONE ENCOUNTER
Patient said that she started having chest pain last night and she thought it was indigestion. She took some Javier Pepper and it didn't rellay help. She said the pain is to the left of her breast bone and to the back of her neck. She said she has some swelling  \"allover\" her body. She said her blood pressure was 143/79 the last time she had PT. She said her friend was worried it is her heart so she is driving her to [facility] right now. Dr Noelle Mandujano advised.

## 2023-05-16 NOTE — TELEPHONE ENCOUNTER
CHEST PAIN RIGHT UNDER LEFT BREAST. NO SOB. \"FEELS SWOLLEN\". THOUGHT IT WAS INDIGESTION BUT FEEL IT IN THE BACK OF HER NECK.

## 2023-05-22 ENCOUNTER — OFFICE VISIT (OUTPATIENT)
Dept: FAMILY MEDICINE CLINIC | Facility: CLINIC | Age: 68
End: 2023-05-22
Payer: MEDICARE

## 2023-05-22 VITALS
WEIGHT: 215.38 LBS | SYSTOLIC BLOOD PRESSURE: 130 MMHG | HEIGHT: 67 IN | RESPIRATION RATE: 19 BRPM | OXYGEN SATURATION: 95 % | HEART RATE: 71 BPM | BODY MASS INDEX: 33.8 KG/M2 | DIASTOLIC BLOOD PRESSURE: 60 MMHG | TEMPERATURE: 98 F

## 2023-05-22 DIAGNOSIS — Z09 FOLLOW-UP EXAM: Primary | ICD-10-CM

## 2023-05-22 DIAGNOSIS — F41.9 ANXIETY: ICD-10-CM

## 2023-05-22 DIAGNOSIS — K21.9 GASTROESOPHAGEAL REFLUX DISEASE, UNSPECIFIED WHETHER ESOPHAGITIS PRESENT: ICD-10-CM

## 2023-05-22 PROBLEM — N39.3 STRESS INCONTINENCE IN FEMALE: Status: ACTIVE | Noted: 2023-03-21

## 2023-05-22 PROBLEM — M62.89 PELVIC FLOOR DYSFUNCTION: Status: ACTIVE | Noted: 2021-10-05

## 2023-05-22 PROCEDURE — 99213 OFFICE O/P EST LOW 20 MIN: CPT

## 2023-05-22 RX ORDER — SUCRALFATE 1 G/1
1 TABLET ORAL
Qty: 120 TABLET | Refills: 0 | Status: CANCELLED | OUTPATIENT
Start: 2023-05-22 | End: 2023-06-21

## 2023-05-22 RX ORDER — IBUPROFEN 600 MG/1
600 TABLET ORAL EVERY 6 HOURS PRN
COMMUNITY
Start: 2023-05-12

## 2023-05-22 RX ORDER — TRAMADOL HYDROCHLORIDE 50 MG/1
50 TABLET ORAL EVERY 6 HOURS PRN
COMMUNITY
Start: 2023-05-12

## 2023-05-22 RX ORDER — ACETAMINOPHEN 500 MG
TABLET ORAL
COMMUNITY
Start: 2023-05-12

## 2023-05-22 RX ORDER — COVID-19 MOLECULAR TEST ASSAY
KIT MISCELLANEOUS
COMMUNITY
Start: 2023-05-10 | End: 2023-05-22 | Stop reason: ALTCHOICE

## 2023-05-22 RX ORDER — ACETAMINOPHEN 325 MG/1
TABLET ORAL
COMMUNITY
Start: 2023-05-12

## 2023-05-22 RX ORDER — IBUPROFEN 600 MG/1
600 TABLET ORAL
COMMUNITY
Start: 2023-05-12

## 2023-05-22 RX ORDER — PHENAZOPYRIDINE HYDROCHLORIDE 200 MG/1
TABLET, FILM COATED ORAL
COMMUNITY
Start: 2023-05-12

## 2023-05-22 RX ORDER — TRAMADOL HYDROCHLORIDE 50 MG/1
50 TABLET ORAL
COMMUNITY
Start: 2023-05-12

## 2023-05-22 RX ORDER — VITAMIN B COMPLEX
TABLET ORAL
COMMUNITY

## 2023-06-06 ENCOUNTER — LABORATORY ENCOUNTER (OUTPATIENT)
Dept: LAB | Age: 68
End: 2023-06-06
Attending: INTERNAL MEDICINE
Payer: MEDICARE

## 2023-06-06 ENCOUNTER — OFFICE VISIT (OUTPATIENT)
Dept: FAMILY MEDICINE CLINIC | Facility: CLINIC | Age: 68
End: 2023-06-06
Payer: MEDICARE

## 2023-06-06 VITALS
BODY MASS INDEX: 33 KG/M2 | WEIGHT: 213.38 LBS | HEART RATE: 68 BPM | OXYGEN SATURATION: 98 % | SYSTOLIC BLOOD PRESSURE: 132 MMHG | TEMPERATURE: 98 F | DIASTOLIC BLOOD PRESSURE: 70 MMHG | RESPIRATION RATE: 18 BRPM

## 2023-06-06 DIAGNOSIS — N39.3 STRESS INCONTINENCE OF URINE: ICD-10-CM

## 2023-06-06 DIAGNOSIS — N39.3 STRESS INCONTINENCE OF URINE: Primary | ICD-10-CM

## 2023-06-06 LAB
ALBUMIN SERPL-MCNC: 4.1 G/DL (ref 3.4–5)
ALBUMIN/GLOB SERPL: 1.3 {RATIO} (ref 1–2)
ALP LIVER SERPL-CCNC: 77 U/L
ALT SERPL-CCNC: 25 U/L
ANION GAP SERPL CALC-SCNC: 2 MMOL/L (ref 0–18)
AST SERPL-CCNC: 14 U/L (ref 15–37)
BASOPHILS # BLD AUTO: 0.07 X10(3) UL (ref 0–0.2)
BASOPHILS NFR BLD AUTO: 1 %
BILIRUB SERPL-MCNC: 0.4 MG/DL (ref 0.1–2)
BILIRUB UR QL STRIP.AUTO: NEGATIVE
BUN BLD-MCNC: 9 MG/DL (ref 7–18)
CALCIUM BLD-MCNC: 8.9 MG/DL (ref 8.5–10.1)
CHLORIDE SERPL-SCNC: 110 MMOL/L (ref 98–112)
CLARITY UR REFRACT.AUTO: CLEAR
CO2 SERPL-SCNC: 27 MMOL/L (ref 21–32)
COLOR UR AUTO: YELLOW
CREAT BLD-MCNC: 0.66 MG/DL
EOSINOPHIL # BLD AUTO: 0.16 X10(3) UL (ref 0–0.7)
EOSINOPHIL NFR BLD AUTO: 2.4 %
ERYTHROCYTE [DISTWIDTH] IN BLOOD BY AUTOMATED COUNT: 13.7 %
FASTING STATUS PATIENT QL REPORTED: NO
GFR SERPLBLD BASED ON 1.73 SQ M-ARVRAT: 95 ML/MIN/1.73M2 (ref 60–?)
GLOBULIN PLAS-MCNC: 3.2 G/DL (ref 2.8–4.4)
GLUCOSE BLD-MCNC: 97 MG/DL (ref 70–99)
GLUCOSE UR STRIP.AUTO-MCNC: NEGATIVE MG/DL
HCT VFR BLD AUTO: 43 %
HGB BLD-MCNC: 13.8 G/DL
IMM GRANULOCYTES # BLD AUTO: 0.01 X10(3) UL (ref 0–1)
IMM GRANULOCYTES NFR BLD: 0.1 %
KETONES UR STRIP.AUTO-MCNC: NEGATIVE MG/DL
LEUKOCYTE ESTERASE UR QL STRIP.AUTO: NEGATIVE
LYMPHOCYTES # BLD AUTO: 1.79 X10(3) UL (ref 1–4)
LYMPHOCYTES NFR BLD AUTO: 26.5 %
MCH RBC QN AUTO: 30 PG (ref 26–34)
MCHC RBC AUTO-ENTMCNC: 32.1 G/DL (ref 31–37)
MCV RBC AUTO: 93.5 FL
MONOCYTES # BLD AUTO: 0.81 X10(3) UL (ref 0.1–1)
MONOCYTES NFR BLD AUTO: 12 %
NEUTROPHILS # BLD AUTO: 3.91 X10 (3) UL (ref 1.5–7.7)
NEUTROPHILS # BLD AUTO: 3.91 X10(3) UL (ref 1.5–7.7)
NEUTROPHILS NFR BLD AUTO: 58 %
NITRITE UR QL STRIP.AUTO: NEGATIVE
OSMOLALITY SERPL CALC.SUM OF ELEC: 287 MOSM/KG (ref 275–295)
PH UR STRIP.AUTO: 6 [PH] (ref 5–8)
PLATELET # BLD AUTO: 288 10(3)UL (ref 150–450)
POTASSIUM SERPL-SCNC: 3.7 MMOL/L (ref 3.5–5.1)
PROT SERPL-MCNC: 7.3 G/DL (ref 6.4–8.2)
PROT UR STRIP.AUTO-MCNC: NEGATIVE MG/DL
RBC # BLD AUTO: 4.6 X10(6)UL
RBC UR QL AUTO: NEGATIVE
SODIUM SERPL-SCNC: 139 MMOL/L (ref 136–145)
SP GR UR STRIP.AUTO: 1.01 (ref 1–1.03)
UROBILINOGEN UR STRIP.AUTO-MCNC: <2 MG/DL
WBC # BLD AUTO: 6.8 X10(3) UL (ref 4–11)

## 2023-06-06 PROCEDURE — 80053 COMPREHEN METABOLIC PANEL: CPT

## 2023-06-06 PROCEDURE — 99214 OFFICE O/P EST MOD 30 MIN: CPT | Performed by: INTERNAL MEDICINE

## 2023-06-06 PROCEDURE — 81001 URINALYSIS AUTO W/SCOPE: CPT

## 2023-06-06 PROCEDURE — 36415 COLL VENOUS BLD VENIPUNCTURE: CPT

## 2023-06-06 PROCEDURE — 85025 COMPLETE CBC W/AUTO DIFF WBC: CPT

## 2023-06-07 ENCOUNTER — TELEPHONE (OUTPATIENT)
Dept: FAMILY MEDICINE CLINIC | Facility: CLINIC | Age: 68
End: 2023-06-07

## 2023-06-07 DIAGNOSIS — Z12.31 SCREENING MAMMOGRAM, ENCOUNTER FOR: Primary | ICD-10-CM

## 2023-06-07 NOTE — TELEPHONE ENCOUNTER
MAMMO ORDER IN [facility] WILL  BEFORE PT CAN GET HER MAMMO DONE, ORDER EXP IN July & SHE IS NOT DUE UNTIL AUGUST, PLEASE PUT NEW ORDER IN EPIC

## 2023-06-26 ENCOUNTER — OFFICE VISIT (OUTPATIENT)
Dept: FAMILY MEDICINE CLINIC | Facility: CLINIC | Age: 68
End: 2023-06-26
Payer: MEDICARE

## 2023-06-26 VITALS
WEIGHT: 217.38 LBS | SYSTOLIC BLOOD PRESSURE: 128 MMHG | OXYGEN SATURATION: 97 % | HEIGHT: 67 IN | TEMPERATURE: 97 F | DIASTOLIC BLOOD PRESSURE: 68 MMHG | BODY MASS INDEX: 34.12 KG/M2 | HEART RATE: 66 BPM | RESPIRATION RATE: 16 BRPM

## 2023-06-26 DIAGNOSIS — G47.33 OSA (OBSTRUCTIVE SLEEP APNEA): Primary | ICD-10-CM

## 2023-06-26 PROCEDURE — 99214 OFFICE O/P EST MOD 30 MIN: CPT | Performed by: NURSE PRACTITIONER

## 2023-06-26 RX ORDER — BACILLUS COAGULANS/INULIN 1B-250 MG
1 CAPSULE ORAL AS DIRECTED
COMMUNITY
Start: 2021-08-31 | End: 2023-06-26

## 2023-06-26 RX ORDER — RIBOFLAVIN (VITAMIN B2) 100 MG
100 TABLET ORAL DAILY
COMMUNITY
End: 2023-06-26

## 2023-06-26 RX ORDER — PYRIDOXINE HCL (VITAMIN B6) 100 MG
1 TABLET ORAL AS DIRECTED
COMMUNITY

## 2023-06-26 RX ORDER — NITROFURANTOIN 25; 75 MG/1; MG/1
100 CAPSULE ORAL NIGHTLY
COMMUNITY
End: 2023-06-26 | Stop reason: ALTCHOICE

## 2023-07-24 RX ORDER — LOSARTAN POTASSIUM 25 MG/1
25 TABLET ORAL DAILY
Qty: 90 TABLET | Refills: 0 | Status: SHIPPED | OUTPATIENT
Start: 2023-07-24

## 2023-07-24 NOTE — TELEPHONE ENCOUNTER
LOSARTAN 25 MG Oral Tab     Hypertension Medications Protocol [id number]/21/2023 07:13 PM   Protocol Details CMP or BMP in past 12 months    Last serum creatinine< 2.0    Appointment in past 6 or next 3 months      Last office visit:   3/7/23    Future Appointments   Date Time Provider Karl Nunesi   8/15/2023  8:15 AM Neli Rico MD [facility] [address]   8/26/2023  9:00 AM Michelle Salgado MD [facility] [place]   8/28/2023  8:40 AM [facility] Abbie [place]   12/11/2023  8:00 AM Poonam Davis APRN [facility] [place] [facility] [place]   Last filled:  2/1/23  #90 with 1 refill   Last labs:  6/6/23  Crea:  0.66

## 2023-08-26 ENCOUNTER — OFFICE VISIT (OUTPATIENT)
Dept: FAMILY MEDICINE CLINIC | Facility: CLINIC | Age: 68
End: 2023-08-26
Payer: MEDICARE

## 2023-08-26 VITALS
DIASTOLIC BLOOD PRESSURE: 68 MMHG | HEIGHT: 67 IN | HEART RATE: 67 BPM | BODY MASS INDEX: 34.26 KG/M2 | WEIGHT: 218.25 LBS | OXYGEN SATURATION: 97 % | RESPIRATION RATE: 16 BRPM | TEMPERATURE: 98 F | SYSTOLIC BLOOD PRESSURE: 132 MMHG

## 2023-08-26 DIAGNOSIS — I10 PRIMARY HYPERTENSION: ICD-10-CM

## 2023-08-26 DIAGNOSIS — E78.00 PURE HYPERCHOLESTEROLEMIA: ICD-10-CM

## 2023-08-26 DIAGNOSIS — N39.498 OTHER URINARY INCONTINENCE: ICD-10-CM

## 2023-08-26 DIAGNOSIS — G25.81 RESTLESS LEGS SYNDROME (RLS): ICD-10-CM

## 2023-08-26 DIAGNOSIS — Z00.00 ENCOUNTER FOR ANNUAL HEALTH EXAMINATION: Primary | ICD-10-CM

## 2023-08-26 DIAGNOSIS — R06.02 SOB (SHORTNESS OF BREATH): ICD-10-CM

## 2023-08-26 DIAGNOSIS — M62.89 PELVIC FLOOR DYSFUNCTION: ICD-10-CM

## 2023-08-26 PROBLEM — K57.92 DIVERTICULITIS: Status: RESOLVED | Noted: 2018-07-16 | Resolved: 2023-08-26

## 2023-08-26 PROCEDURE — G0439 PPPS, SUBSEQ VISIT: HCPCS | Performed by: INTERNAL MEDICINE

## 2023-08-26 PROCEDURE — 1125F AMNT PAIN NOTED PAIN PRSNT: CPT | Performed by: INTERNAL MEDICINE

## 2023-08-28 ENCOUNTER — HOSPITAL ENCOUNTER (OUTPATIENT)
Dept: MAMMOGRAPHY | Age: 68
Discharge: HOME | End: 2023-08-28
Attending: INTERNAL MEDICINE
Payer: MEDICARE

## 2023-08-28 DIAGNOSIS — Z12.31 SCREENING MAMMOGRAM, ENCOUNTER FOR: ICD-10-CM

## 2023-08-28 PROCEDURE — 77063 BREAST TOMOSYNTHESIS BI: CPT | Performed by: INTERNAL MEDICINE

## 2023-08-28 PROCEDURE — 77067 SCR MAMMO BI INCL CAD: CPT | Performed by: INTERNAL MEDICINE

## 2023-08-29 ENCOUNTER — RT VISIT (OUTPATIENT)
Dept: RESPIRATORY THERAPY | Facility: HOSPITAL | Age: 68
End: 2023-08-29
Attending: INTERNAL MEDICINE
Payer: MEDICARE

## 2023-08-29 DIAGNOSIS — R06.02 SOB (SHORTNESS OF BREATH): ICD-10-CM

## 2023-08-29 PROCEDURE — 94726 PLETHYSMOGRAPHY LUNG VOLUMES: CPT | Performed by: INTERNAL MEDICINE

## 2023-08-29 PROCEDURE — 94729 DIFFUSING CAPACITY: CPT | Performed by: INTERNAL MEDICINE

## 2023-08-29 PROCEDURE — 94060 EVALUATION OF WHEEZING: CPT | Performed by: INTERNAL MEDICINE

## 2023-10-03 ENCOUNTER — HOSPITAL ENCOUNTER (OUTPATIENT)
Age: 68
Discharge: HOME | End: 2023-10-03
Payer: MEDICARE

## 2023-10-03 VITALS
HEIGHT: 67 IN | TEMPERATURE: 99 F | SYSTOLIC BLOOD PRESSURE: 150 MMHG | HEART RATE: 84 BPM | DIASTOLIC BLOOD PRESSURE: 74 MMHG | WEIGHT: 219 LBS | BODY MASS INDEX: 34.37 KG/M2 | OXYGEN SATURATION: 95 % | RESPIRATION RATE: 18 BRPM

## 2023-10-03 DIAGNOSIS — R05.9 COUGH: Primary | ICD-10-CM

## 2023-10-03 DIAGNOSIS — J06.9 VIRAL UPPER RESPIRATORY ILLNESS: ICD-10-CM

## 2023-10-03 LAB
S PYO AG THROAT QL: NEGATIVE
SARS-COV-2 RNA RESP QL NAA+PROBE: NOT DETECTED

## 2023-10-03 PROCEDURE — 87880 STREP A ASSAY W/OPTIC: CPT | Performed by: PHYSICIAN ASSISTANT

## 2023-10-03 PROCEDURE — U0002 COVID-19 LAB TEST NON-CDC: HCPCS | Performed by: PHYSICIAN ASSISTANT

## 2023-10-03 PROCEDURE — 99213 OFFICE O/P EST LOW 20 MIN: CPT | Performed by: PHYSICIAN ASSISTANT

## 2023-10-03 RX ORDER — ALBUTEROL SULFATE 90 UG/1
2 AEROSOL, METERED RESPIRATORY (INHALATION) EVERY 4 HOURS PRN
Qty: 1 EACH | Refills: 0 | Status: SHIPPED | OUTPATIENT
Start: 2023-10-03 | End: 2023-11-02

## 2023-10-03 RX ORDER — BENZONATATE 100 MG/1
100 CAPSULE ORAL 3 TIMES DAILY PRN
Qty: 30 CAPSULE | Refills: 0 | Status: SHIPPED | OUTPATIENT
Start: 2023-10-03 | End: 2023-11-02

## 2023-10-03 RX ORDER — FLUTICASONE PROPIONATE 50 MCG
2 SPRAY, SUSPENSION (ML) NASAL DAILY
Qty: 16 G | Refills: 0 | Status: SHIPPED | OUTPATIENT
Start: 2023-10-03 | End: 2023-11-02

## 2023-10-03 RX ORDER — IBUPROFEN 600 MG/1
600 TABLET ORAL ONCE
Status: COMPLETED | OUTPATIENT
Start: 2023-10-03 | End: 2023-10-03

## 2023-10-03 NOTE — DISCHARGE INSTRUCTIONS
If you develop fever, shortness of breath or other worsening please return for chest x-ray to rule out pneumonia. Tessalon for cough suppression. 2 puffs of inhaler every 4 hours. Purchase Sudafed from the pharmacist and take as directed. 2 sprays of Flonase each nostril.   Continue your antihistamine

## 2023-10-12 ENCOUNTER — OFFICE VISIT (OUTPATIENT)
Dept: FAMILY MEDICINE CLINIC | Facility: CLINIC | Age: 68
End: 2023-10-12
Payer: MEDICARE

## 2023-10-12 VITALS
HEART RATE: 78 BPM | BODY MASS INDEX: 35 KG/M2 | SYSTOLIC BLOOD PRESSURE: 138 MMHG | RESPIRATION RATE: 30 BRPM | OXYGEN SATURATION: 96 % | TEMPERATURE: 98 F | DIASTOLIC BLOOD PRESSURE: 62 MMHG | WEIGHT: 224 LBS

## 2023-10-12 DIAGNOSIS — R05.1 ACUTE COUGH: ICD-10-CM

## 2023-10-12 DIAGNOSIS — J01.10 ACUTE NON-RECURRENT FRONTAL SINUSITIS: Primary | ICD-10-CM

## 2023-10-12 PROCEDURE — 99213 OFFICE O/P EST LOW 20 MIN: CPT

## 2023-10-12 RX ORDER — AMOXICILLIN AND CLAVULANATE POTASSIUM 875; 125 MG/1; MG/1
1 TABLET, FILM COATED ORAL 2 TIMES DAILY
Qty: 20 TABLET | Refills: 0 | Status: SHIPPED | OUTPATIENT
Start: 2023-10-12 | End: 2023-10-22

## 2023-10-13 RX ORDER — LOSARTAN POTASSIUM 25 MG/1
25 TABLET ORAL DAILY
Qty: 90 TABLET | Refills: 0 | Status: SHIPPED | OUTPATIENT
Start: 2023-10-13

## 2023-10-13 NOTE — TELEPHONE ENCOUNTER
OV 08/26  Refill 07/24 #90  LABS 06/06    Requested Prescriptions     Pending Prescriptions Disp Refills    LOSARTAN 25 MG Oral Tab [Pharmacy Med Name: Losartan Potassium 25 MG Oral Tablet] 90 tablet 0     Sig: Take 1 tablet by mouth once daily     Future Appointments   Date Time Provider Karl Faith   10/18/2023  9:30 AM [facility] TM/STRESS/ECHO DAVID Demarco [address]

## 2023-10-18 ENCOUNTER — HOSPITAL ENCOUNTER (OUTPATIENT)
Dept: CV DIAGNOSTICS | Age: 68
Discharge: HOME | End: 2023-10-18
Attending: INTERNAL MEDICINE
Payer: MEDICARE

## 2023-10-18 DIAGNOSIS — R06.02 SOB (SHORTNESS OF BREATH): ICD-10-CM

## 2023-10-18 PROCEDURE — 93306 TTE W/DOPPLER COMPLETE: CPT | Performed by: INTERNAL MEDICINE

## 2023-10-20 RX ORDER — LOSARTAN POTASSIUM 25 MG/1
25 TABLET ORAL DAILY
Qty: 90 TABLET | Refills: 0 | OUTPATIENT
Start: 2023-10-20

## 2023-10-20 NOTE — TELEPHONE ENCOUNTER
Losartan 25 MG oral tab    Hypertension Medications Protocol [id number]/20/2023 12:51 PM   Protocol Details CMP or BMP in past 12 months    Last serum creatinine< 2.0    Appointment in past 6 or next 3 months        Last office visit:  8/26/23    No future appointments.   Last filled:  10/13/23  #90 with 0 refills   Last labs:    Duplicate request O positive

## 2023-11-16 ENCOUNTER — TELEPHONE (OUTPATIENT)
Dept: FAMILY MEDICINE CLINIC | Facility: CLINIC | Age: 68
End: 2023-11-16

## 2023-11-16 NOTE — TELEPHONE ENCOUNTER
She is eligible due to age (>70) BUT NO CHRONIC lung disease, so it is up to her. I believe vaccinations help more than hurt.

## 2023-12-08 ENCOUNTER — LABORATORY ENCOUNTER (OUTPATIENT)
Dept: LAB | Age: 68
End: 2023-12-08
Payer: MEDICARE

## 2023-12-08 ENCOUNTER — OFFICE VISIT (OUTPATIENT)
Dept: FAMILY MEDICINE CLINIC | Facility: CLINIC | Age: 68
End: 2023-12-08
Payer: MEDICARE

## 2023-12-08 VITALS
HEART RATE: 88 BPM | WEIGHT: 226.63 LBS | BODY MASS INDEX: 35.57 KG/M2 | TEMPERATURE: 98 F | SYSTOLIC BLOOD PRESSURE: 130 MMHG | HEIGHT: 67 IN | DIASTOLIC BLOOD PRESSURE: 75 MMHG | OXYGEN SATURATION: 96 %

## 2023-12-08 DIAGNOSIS — B00.9 HERPES: Primary | ICD-10-CM

## 2023-12-08 DIAGNOSIS — B00.9 HERPES: ICD-10-CM

## 2023-12-08 PROCEDURE — 86695 HERPES SIMPLEX TYPE 1 TEST: CPT

## 2023-12-08 PROCEDURE — 86696 HERPES SIMPLEX TYPE 2 TEST: CPT

## 2023-12-08 PROCEDURE — 36415 COLL VENOUS BLD VENIPUNCTURE: CPT

## 2023-12-08 PROCEDURE — 99213 OFFICE O/P EST LOW 20 MIN: CPT

## 2023-12-08 RX ORDER — SIMVASTATIN 20 MG
20 TABLET ORAL NIGHTLY
COMMUNITY
Start: 2023-11-22

## 2023-12-08 RX ORDER — ACYCLOVIR 800 MG/1
800 TABLET ORAL 2 TIMES DAILY
Qty: 20 TABLET | Refills: 0 | Status: SHIPPED | OUTPATIENT
Start: 2023-12-08 | End: 2023-12-18

## 2023-12-08 RX ORDER — MILK THISTLE 100 %
1 POWDER (GRAM) MISCELLANEOUS DAILY
COMMUNITY
Start: 2021-08-31

## 2023-12-09 ENCOUNTER — TELEPHONE (OUTPATIENT)
Dept: FAMILY MEDICINE CLINIC | Facility: CLINIC | Age: 68
End: 2023-12-09

## 2023-12-09 NOTE — TELEPHONE ENCOUNTER
Pt said that she thinks she may be having a side effect to the acyclovir 800 MG Oral Tab that she was prescribed, pt said that she took 2 doses yesterday and felt fine, but woke up this morning with a brownish color to her urine and pain urinating, she feels like she has to use the bathroom, but is unable to. She said these symptoms didn't start until she started medication. Dr Steph Baca is sending her a prescription for UTI, but pt wants to know if she should stop taking the med prescribed.

## 2023-12-09 NOTE — TELEPHONE ENCOUNTER
Patient unable to urinate, and has pulsating pressure over bladder; not emptying. Advised-  waiting to hear from urologist at [facility]; update to Dr Christiano Dimas- needs to go to [address] [place] [address] or [facility] where her urogyne is on staff. Expresses understanding.

## 2023-12-11 LAB
HSV 1 GLYCOPROTEIN G, IGG: POSITIVE
HSV 2 GLYCOPROTEIN G, IGG: POSITIVE

## 2024-01-25 NOTE — TELEPHONE ENCOUNTER
simvastatin 20 MG Oral Tab,   LOSARTAN 25 MG Oral Tab   César from [place] requesting refill.

## 2024-01-26 RX ORDER — SIMVASTATIN 20 MG
20 TABLET ORAL NIGHTLY
Qty: 30 TABLET | Refills: 0 | Status: SHIPPED | OUTPATIENT
Start: 2024-01-26

## 2024-01-26 RX ORDER — LOSARTAN POTASSIUM 25 MG/1
25 TABLET ORAL DAILY
Qty: 30 TABLET | Refills: 0 | Status: SHIPPED | OUTPATIENT
Start: 2024-01-26

## 2024-01-30 ENCOUNTER — OFFICE VISIT (OUTPATIENT)
Dept: FAMILY MEDICINE CLINIC | Facility: CLINIC | Age: 69
End: 2024-01-30
Payer: MEDICARE

## 2024-01-30 VITALS
DIASTOLIC BLOOD PRESSURE: 70 MMHG | WEIGHT: 228.63 LBS | BODY MASS INDEX: 35.88 KG/M2 | TEMPERATURE: 98 F | OXYGEN SATURATION: 97 % | HEIGHT: 67 IN | HEART RATE: 79 BPM | SYSTOLIC BLOOD PRESSURE: 165 MMHG

## 2024-01-30 DIAGNOSIS — R51.9 ACUTE NONINTRACTABLE HEADACHE, UNSPECIFIED HEADACHE TYPE: ICD-10-CM

## 2024-01-30 DIAGNOSIS — R06.02 SOB (SHORTNESS OF BREATH): Primary | ICD-10-CM

## 2024-01-30 DIAGNOSIS — J02.9 SORE THROAT: ICD-10-CM

## 2024-01-30 LAB
COVID19 BINAX NOW ANTIGEN: NOT DETECTED
OPERATOR ID: NORMAL

## 2024-01-30 PROCEDURE — 94640 AIRWAY INHALATION TREATMENT: CPT

## 2024-01-30 PROCEDURE — 99213 OFFICE O/P EST LOW 20 MIN: CPT

## 2024-01-30 RX ORDER — IPRATROPIUM BROMIDE AND ALBUTEROL SULFATE 2.5; .5 MG/3ML; MG/3ML
3 SOLUTION RESPIRATORY (INHALATION) EVERY 12 HOURS
Qty: 60 ML | Refills: 0 | Status: SHIPPED | OUTPATIENT
Start: 2024-01-30 | End: 2024-02-09

## 2024-01-30 RX ORDER — PREDNISONE 20 MG/1
20 TABLET ORAL DAILY
Qty: 5 TABLET | Refills: 0 | Status: CANCELLED | OUTPATIENT
Start: 2024-01-30 | End: 2024-02-04

## 2024-01-30 RX ORDER — IPRATROPIUM BROMIDE AND ALBUTEROL SULFATE 2.5; .5 MG/3ML; MG/3ML
3 SOLUTION RESPIRATORY (INHALATION) ONCE
Status: COMPLETED | OUTPATIENT
Start: 2024-01-30 | End: 2024-01-30

## 2024-01-30 RX ADMIN — IPRATROPIUM BROMIDE AND ALBUTEROL SULFATE 3 ML: 2.5; .5 SOLUTION RESPIRATORY (INHALATION) at 15:22:00

## 2024-01-30 NOTE — PROGRESS NOTES
Armida Suarez is a 68 year old female.  HPI:     Patient in office for shortness of breath that has been going on for 2 weeks but worse in the past few days as well as headache, sore throat and low grade fever that started yesterday.  She took a home COVID-19 test 3 days ago that was negative.  She has not used any over the counter medications for symptoms. She reports feeling short of breath with any exertion (i.e walking from car in parking lot or running to grab the phone.    She had a cardiac echo done 3 months ago and pulmonary function test done in August.      Current Outpatient Medications   Medication Sig Dispense Refill    simvastatin 20 MG Oral Tab Take 1 tablet (20 mg total) by mouth nightly. 30 tablet 0    losartan 25 MG Oral Tab Take 1 tablet (25 mg total) by mouth daily. 30 tablet 0    Milk Thistle Does not apply Powder Take 1 capsule by mouth daily.      Coenzyme Q10 (COQ10) 100 MG Oral Cap CoQ10      estradiol 0.1 MG/GM Vaginal Cream APPLY A PEA SIZED AMOUNT OF CREAM PER VAGINA TWICE WEEKLY      Milk Thistle 1000 MG Oral Cap Milk Thistle 1000 MG Oral Capsule QTY: 0 capsule Days: 0 Refills: 0  Written: 08/31/21 Patient Instructions:      Ascorbic Acid (VITAMIN C OR) Take 1,000 mg by mouth 2 (two) times daily.      Calcium-Magnesium 200-100 MG Oral Tab Take 1 tablet by mouth nightly.      Cholecalciferol (VITAMIN D3) 1.25 MG (37547 UT) Oral Cap Take 5,000 Int'l Units by mouth daily.      omega-3 fatty acids 1000 MG Oral Cap Take 1,000 mg by mouth daily.      Lactobacillus (PROBIOTIC ACIDOPHILUS OR) Take 2 tablets by mouth 2 (two) times daily.      Fexofenadine HCl 60 MG Oral Tab Take 1 tablet (60 mg total) by mouth daily.      NON FORMULARY Anxie-T stress Support (Patient not taking: Reported on 1/30/2024)      Pantoprazole Sodium 40 MG Oral Tab EC Take 1 tablet (40 mg total) by mouth as needed. daily before meals (Patient not taking: Reported on 1/30/2024)        Past Medical History:   Diagnosis  Date    Abdominal pain, unspecified site 07/07/2011    Acute sinusitis, unspecified 08/20/2011    Allergic rhinitis, cause unspecified 3/29/2010    Bronchitis, not specified as acute or chronic 3/17/2011    Disequilibrium     Dizziness and giddiness     04/09/2012    Fuchs' corneal dystrophy     Influenza with other respiratory manifestations 02/07/2011    Other psoriasis 3/29/2010    Other urinary incontinence 3/29/2010    Pure hypercholesterolemia 3/29/2010    Sprain of neck 1/20/2011      Social History:  Social History     Socioeconomic History    Marital status: OTHER   Tobacco Use    Smoking status: Never    Smokeless tobacco: Never    Tobacco comments:     Current Non smoker    Vaping Use    Vaping Use: Never used   Substance and Sexual Activity    Alcohol use: No     Alcohol/week: 0.0 standard drinks of alcohol    Drug use: No    Sexual activity: Not Currently   Other Topics Concern    Exercise Yes     Comment: 4 x week           REVIEW OF SYSTEMS:     Review of Systems   Constitutional:  Positive for fever. Negative for activity change, appetite change and fatigue.   HENT:  Positive for sore throat. Negative for congestion and hearing loss.    Eyes:  Negative for discharge and redness.   Respiratory:  Positive for shortness of breath. Negative for wheezing.    Cardiovascular:  Negative for chest pain.   Gastrointestinal:  Negative for abdominal distention and abdominal pain.   Endocrine: Negative for cold intolerance and heat intolerance.   Genitourinary:  Negative for difficulty urinating and urgency.   Musculoskeletal:  Negative for arthralgias and back pain.   Skin:  Negative for color change.   Allergic/Immunologic: Negative for environmental allergies.   Neurological:  Positive for headaches. Negative for dizziness and syncope.   Hematological:  Negative for adenopathy. Does not bruise/bleed easily.   Psychiatric/Behavioral:  Negative for agitation, behavioral problems and confusion.        EXAM:   BP  (!) 165/70   Pulse 79   Temp 98.1 °F (36.7 °C) (Temporal)   Ht 5' 7\" (1.702 m)   Wt 228 lb 9.6 oz (103.7 kg)   SpO2 97%   BMI 35.80 kg/m²     Physical Exam  Constitutional:       Appearance: Normal appearance.   HENT:      Head: Normocephalic and atraumatic.      Right Ear: Tympanic membrane normal.      Left Ear: Tympanic membrane normal.      Nose: Congestion and rhinorrhea present.      Mouth/Throat:      Mouth: Mucous membranes are moist.      Pharynx: Oropharynx is clear. Posterior oropharyngeal erythema present.   Eyes:      Extraocular Movements: Extraocular movements intact.      Conjunctiva/sclera: Conjunctivae normal.      Pupils: Pupils are equal, round, and reactive to light.   Cardiovascular:      Rate and Rhythm: Normal rate and regular rhythm.      Pulses: Normal pulses.      Heart sounds: Normal heart sounds.   Pulmonary:      Effort: Pulmonary effort is normal.      Breath sounds: Normal breath sounds.   Musculoskeletal:         General: Normal range of motion.      Cervical back: Normal range of motion.   Lymphadenopathy:      Cervical: Cervical adenopathy present.   Skin:     General: Skin is warm and dry.      Capillary Refill: Capillary refill takes less than 2 seconds.   Neurological:      Mental Status: She is alert and oriented to person, place, and time.   Psychiatric:         Mood and Affect: Mood normal.         Behavior: Behavior normal.          ASSESSMENT AND PLAN:     1. SOB (shortness of breath)  Duo neb done in office and patient reports some improvement to symptoms.  Prednisone ordered and instructions provided.  - ipratropium-albuterol (Duoneb) 0.5-2.5 (3) MG/3ML inhalation solution 3 mL    2. Sore throat  Recommended use of lozenges over the counter and hot tea with honey.    3. Acute nonintractable headache, unspecified headache type  Recommended use of tylenol over the counter for headache symptoms.     The patient indicates understanding of these issues and agrees to the  plan.      Yarely Doe, APRN  1/30/2024

## 2024-02-05 ENCOUNTER — OFFICE VISIT (OUTPATIENT)
Dept: FAMILY MEDICINE CLINIC | Facility: CLINIC | Age: 69
End: 2024-02-05
Payer: MEDICARE

## 2024-02-05 ENCOUNTER — TELEPHONE (OUTPATIENT)
Dept: FAMILY MEDICINE CLINIC | Facility: CLINIC | Age: 69
End: 2024-02-05

## 2024-02-05 VITALS
OXYGEN SATURATION: 96 % | BODY MASS INDEX: 34.53 KG/M2 | WEIGHT: 220 LBS | HEART RATE: 81 BPM | SYSTOLIC BLOOD PRESSURE: 150 MMHG | DIASTOLIC BLOOD PRESSURE: 75 MMHG | TEMPERATURE: 98 F | HEIGHT: 67 IN

## 2024-02-05 DIAGNOSIS — I10 PRIMARY HYPERTENSION: ICD-10-CM

## 2024-02-05 DIAGNOSIS — J01.10 ACUTE NON-RECURRENT FRONTAL SINUSITIS: Primary | ICD-10-CM

## 2024-02-05 DIAGNOSIS — E78.00 HYPERCHOLESTEREMIA: ICD-10-CM

## 2024-02-05 DIAGNOSIS — R05.1 ACUTE COUGH: ICD-10-CM

## 2024-02-05 DIAGNOSIS — E78.00 PURE HYPERCHOLESTEROLEMIA: ICD-10-CM

## 2024-02-05 DIAGNOSIS — R51.9 ACUTE NONINTRACTABLE HEADACHE, UNSPECIFIED HEADACHE TYPE: Primary | ICD-10-CM

## 2024-02-05 PROCEDURE — 99213 OFFICE O/P EST LOW 20 MIN: CPT

## 2024-02-05 PROCEDURE — 87637 SARSCOV2&INF A&B&RSV AMP PRB: CPT

## 2024-02-05 RX ORDER — IBUPROFEN 600 MG/1
600 TABLET ORAL EVERY 6 HOURS PRN
Qty: 30 TABLET | Refills: 0 | Status: SHIPPED | OUTPATIENT
Start: 2024-02-05

## 2024-02-05 RX ORDER — AMOXICILLIN AND CLAVULANATE POTASSIUM 875; 125 MG/1; MG/1
1 TABLET, FILM COATED ORAL 2 TIMES DAILY
Qty: 20 TABLET | Refills: 0 | Status: SHIPPED | OUTPATIENT
Start: 2024-02-05 | End: 2024-02-15

## 2024-02-05 NOTE — TELEPHONE ENCOUNTER
Patient has a lab appointment scheduled 2/26/24 and an appointment with Dr Morel 2/27/34 for a physical.  Please place lab orders.

## 2024-02-05 NOTE — PROGRESS NOTES
Armida Suarez is a 68 year old female.  HPI:     Patient in office with symptoms of cough, fever, headache and hoarseness.  She was seen in office 6 days ago for symptoms of cough and short of breath and given nebulizer with duoneb to be used twice a day for 14 days.  She reports this has been helpful.  She has used motrin for fever.  She reports feeling sinus pressure, ear pressure and swollen glands as well. She has been taking sudafed over the counter for symptoms and the box said if taking for more then 7 days to contact her doctor and tomorrow will be 7 days.     Current Outpatient Medications   Medication Sig Dispense Refill    ipratropium-albuterol 0.5-2.5 (3) MG/3ML Inhalation Solution Take 3 mL by nebulization every 12 (twelve) hours for 10 days. 60 mL 0    simvastatin 20 MG Oral Tab Take 1 tablet (20 mg total) by mouth nightly. 30 tablet 0    losartan 25 MG Oral Tab Take 1 tablet (25 mg total) by mouth daily. 30 tablet 0    Milk Thistle Does not apply Powder Take 1 capsule by mouth daily.      NON FORMULARY Anxie-T stress Support (Patient not taking: Reported on 1/30/2024)      Coenzyme Q10 (COQ10) 100 MG Oral Cap CoQ10      estradiol 0.1 MG/GM Vaginal Cream APPLY A PEA SIZED AMOUNT OF CREAM PER VAGINA TWICE WEEKLY      Milk Thistle 1000 MG Oral Cap Milk Thistle 1000 MG Oral Capsule QTY: 0 capsule Days: 0 Refills: 0  Written: 08/31/21 Patient Instructions:      Pantoprazole Sodium 40 MG Oral Tab EC Take 1 tablet (40 mg total) by mouth as needed. daily before meals (Patient not taking: Reported on 1/30/2024)      Ascorbic Acid (VITAMIN C OR) Take 1,000 mg by mouth 2 (two) times daily.      Calcium-Magnesium 200-100 MG Oral Tab Take 1 tablet by mouth nightly.      Cholecalciferol (VITAMIN D3) 1.25 MG (93168 UT) Oral Cap Take 5,000 Int'l Units by mouth daily.      omega-3 fatty acids 1000 MG Oral Cap Take 1,000 mg by mouth daily.      Lactobacillus (PROBIOTIC ACIDOPHILUS OR) Take 2 tablets by mouth 2  (two) times daily.      Fexofenadine HCl 60 MG Oral Tab Take 1 tablet (60 mg total) by mouth daily.        Past Medical History:   Diagnosis Date    Abdominal pain, unspecified site 07/07/2011    Acute sinusitis, unspecified 08/20/2011    Allergic rhinitis, cause unspecified 3/29/2010    Bronchitis, not specified as acute or chronic 3/17/2011    Disequilibrium     Dizziness and giddiness     04/09/2012    Fuchs' corneal dystrophy     Influenza with other respiratory manifestations 02/07/2011    Other psoriasis 3/29/2010    Other urinary incontinence 3/29/2010    Pure hypercholesterolemia 3/29/2010    Sprain of neck 1/20/2011      Social History:  Social History     Socioeconomic History    Marital status: OTHER   Tobacco Use    Smoking status: Never    Smokeless tobacco: Never    Tobacco comments:     Current Non smoker    Vaping Use    Vaping Use: Never used   Substance and Sexual Activity    Alcohol use: No     Alcohol/week: 0.0 standard drinks of alcohol    Drug use: No    Sexual activity: Not Currently   Other Topics Concern    Exercise Yes     Comment: 4 x week           REVIEW OF SYSTEMS:     Review of Systems   Constitutional:  Positive for fever. Negative for activity change and appetite change.   HENT:  Positive for congestion, sinus pressure, sinus pain and voice change. Negative for hearing loss.    Eyes:  Negative for discharge and redness.   Respiratory:  Positive for cough. Negative for shortness of breath and wheezing.    Cardiovascular:  Negative for chest pain.   Gastrointestinal:  Negative for abdominal distention and abdominal pain.   Endocrine: Negative for cold intolerance and heat intolerance.   Genitourinary:  Negative for difficulty urinating and urgency.   Musculoskeletal:  Negative for arthralgias and back pain.   Skin:  Negative for color change.   Allergic/Immunologic: Negative for environmental allergies.   Neurological:  Positive for headaches. Negative for dizziness and syncope.    Hematological:  Negative for adenopathy. Does not bruise/bleed easily.   Psychiatric/Behavioral:  Negative for agitation, behavioral problems and confusion.        EXAM:   /75   Pulse 81   Temp 98.3 °F (36.8 °C) (Temporal)   Ht 5' 7\" (1.702 m)   Wt 220 lb (99.8 kg)   SpO2 96%   BMI 34.46 kg/m²     Physical Exam  Constitutional:       Appearance: Normal appearance.   HENT:      Head: Normocephalic and atraumatic.      Right Ear: Tympanic membrane is retracted.      Left Ear: Tympanic membrane is retracted.      Nose:      Right Turbinates: Swollen.      Left Turbinates: Swollen.      Right Sinus: Maxillary sinus tenderness and frontal sinus tenderness present.      Left Sinus: Maxillary sinus tenderness and frontal sinus tenderness present.      Mouth/Throat:      Mouth: Mucous membranes are moist.      Pharynx: Oropharynx is clear.   Eyes:      Extraocular Movements: Extraocular movements intact.      Conjunctiva/sclera: Conjunctivae normal.      Pupils: Pupils are equal, round, and reactive to light.   Cardiovascular:      Rate and Rhythm: Normal rate and regular rhythm.      Pulses: Normal pulses.      Heart sounds: Normal heart sounds.   Pulmonary:      Effort: Pulmonary effort is normal.      Breath sounds: Normal breath sounds.   Musculoskeletal:         General: Normal range of motion.      Cervical back: Normal range of motion.   Lymphadenopathy:      Cervical: Cervical adenopathy present.   Skin:     General: Skin is warm and dry.      Capillary Refill: Capillary refill takes less than 2 seconds.   Neurological:      Mental Status: She is alert and oriented to person, place, and time.   Psychiatric:         Mood and Affect: Mood normal.         Behavior: Behavior normal.          ASSESSMENT AND PLAN:     1. Acute non-recurrent frontal sinusitis  Augmentin sent to pharmacy and instructions provided.   - amoxicillin clavulanate 875-125 MG Oral Tab; Take 1 tablet by mouth 2 (two) times daily for  10 days.  Dispense: 20 tablet; Refill: 0  - ibuprofen 600 MG Oral Tab; Take 1 tablet (600 mg total) by mouth every 6 (six) hours as needed for Pain.  Dispense: 30 tablet; Refill: 0    2. Acute cough  Viral swab done and patient ill be contacted with results.  - SARS-CoV-2/Flu A and B/RSV by PCR ([facility]) [E]; Future  - SARS-CoV-2/Flu A and B/RSV by PCR ([facility]) [E]    The patient indicates understanding of these issues and agrees to the plan.      Yarely Doe, APRN  2/5/2024

## 2024-02-06 LAB
FLUAV + FLUBV RNA SPEC NAA+PROBE: NOT DETECTED
FLUAV + FLUBV RNA SPEC NAA+PROBE: NOT DETECTED
RSV RNA SPEC NAA+PROBE: NOT DETECTED
SARS-COV-2 RNA RESP QL NAA+PROBE: NOT DETECTED

## 2024-02-21 ENCOUNTER — TELEPHONE (OUTPATIENT)
Dept: FAMILY MEDICINE CLINIC | Facility: CLINIC | Age: 69
End: 2024-02-21

## 2024-02-21 NOTE — TELEPHONE ENCOUNTER
DOES SHE STILL NEED TO HAVE BLOOD WORK DONE ON MONDAY SINCE SHE IS COMING TO SEE DR BROWNLEE ON TUESDAY?  SHE IS ASKING SINCE SHE WAS JUST RELEASED  FROM THE HOSPITAL AND HER MEDS CHANGED

## 2024-02-27 ENCOUNTER — OFFICE VISIT (OUTPATIENT)
Dept: FAMILY MEDICINE CLINIC | Facility: CLINIC | Age: 69
End: 2024-02-27
Payer: MEDICARE

## 2024-02-27 VITALS
DIASTOLIC BLOOD PRESSURE: 72 MMHG | TEMPERATURE: 98 F | BODY MASS INDEX: 36 KG/M2 | RESPIRATION RATE: 18 BRPM | WEIGHT: 228 LBS | SYSTOLIC BLOOD PRESSURE: 136 MMHG | HEART RATE: 66 BPM | OXYGEN SATURATION: 95 %

## 2024-02-27 DIAGNOSIS — I21.02 STEMI INVOLVING LEFT ANTERIOR DESCENDING CORONARY ARTERY (HCC): Primary | ICD-10-CM

## 2024-02-27 DIAGNOSIS — Z95.5 PRESENCE OF DRUG-ELUTING STENT IN LEFT CIRCUMFLEX CORONARY ARTERY: ICD-10-CM

## 2024-02-27 DIAGNOSIS — Z98.61 POSTSURGICAL PERCUTANEOUS TRANSLUMINAL CORONARY ANGIOPLASTY (PTCA) STATUS: ICD-10-CM

## 2024-02-27 DIAGNOSIS — I10 PRIMARY HYPERTENSION: ICD-10-CM

## 2024-02-27 PROCEDURE — 99214 OFFICE O/P EST MOD 30 MIN: CPT | Performed by: INTERNAL MEDICINE

## 2024-02-27 RX ORDER — CLOPIDOGREL BISULFATE 75 MG/1
75 TABLET ORAL DAILY
COMMUNITY
Start: 2024-02-21

## 2024-02-27 RX ORDER — ATORVASTATIN CALCIUM 80 MG/1
80 TABLET, FILM COATED ORAL NIGHTLY
COMMUNITY
Start: 2024-02-20

## 2024-02-27 RX ORDER — CARVEDILOL 3.12 MG/1
3.12 TABLET ORAL 2 TIMES DAILY WITH MEALS
COMMUNITY
Start: 2024-02-20

## 2024-02-27 RX ORDER — LOSARTAN POTASSIUM 25 MG/1
25 TABLET ORAL DAILY
Qty: 90 TABLET | Refills: 3 | Status: SHIPPED | OUTPATIENT
Start: 2024-02-27 | End: 2025-02-21

## 2024-02-27 RX ORDER — METHENAMINE HIPPURATE 1000 MG/1
1 TABLET ORAL 2 TIMES DAILY
COMMUNITY

## 2024-02-27 RX ORDER — ASPIRIN 81 MG/1
81 TABLET ORAL DAILY
COMMUNITY
Start: 2024-02-21

## 2024-02-27 NOTE — PROGRESS NOTES
Armida Suarez is a 68 year old female.  HPI:   Pt called 911 2/18/2024 due to chest pain and nausea, pain between the shoulder blades. She has a cardiac cath 2/19 after troponin peaked at 52,000 overnight,   She had 2 stents placed; LAD and left circumflex. Her EF 50%.  (Dr Mitchell/ [facility]) She was sent home on a beta blocker, ASA, and Clopidogrel. She has follow up in 2 days with cardiology and will start cardiac rehab soon.  No chest pain since return from hospital.   Current Outpatient Medications   Medication Sig Dispense Refill    aspirin 81 MG Oral Tab EC Take 1 tablet (81 mg total) by mouth daily.      atorvastatin 80 MG Oral Tab Take 1 tablet (80 mg total) by mouth nightly.      carvedilol 3.125 MG Oral Tab Take 1 tablet (3.125 mg total) by mouth 2 (two) times daily with meals.      clopidogrel 75 MG Oral Tab Take 1 tablet (75 mg total) by mouth daily.      methenamine 1 g Oral Tab Take 1 tablet (1 g total) by mouth 2 (two) times daily.      losartan 25 MG Oral Tab Take 1 tablet (25 mg total) by mouth daily. 90 tablet 3    estradiol 0.1 MG/GM Vaginal Cream APPLY A PEA SIZED AMOUNT OF CREAM PER VAGINA TWICE WEEKLY      Cholecalciferol (VITAMIN D3) 1.25 MG (76895 UT) Oral Cap Take 5,000 Int'l Units by mouth daily.      ibuprofen 600 MG Oral Tab Take 1 tablet (600 mg total) by mouth every 6 (six) hours as needed for Pain. (Patient not taking: Reported on 2/27/2024) 30 tablet 0    Milk Thistle Does not apply Powder Take 1 capsule by mouth daily. (Patient not taking: Reported on 2/27/2024)      NON FORMULARY Anxie-T stress Support (Patient not taking: Reported on 1/30/2024)      Coenzyme Q10 (COQ10) 100 MG Oral Cap CoQ10 (Patient not taking: Reported on 2/27/2024)      Milk Thistle 1000 MG Oral Cap Milk Thistle 1000 MG Oral Capsule QTY: 0 capsule Days: 0 Refills: 0  Written: 08/31/21 Patient Instructions: (Patient not taking: Reported on 2/27/2024)      Pantoprazole Sodium 40 MG Oral Tab EC Take 1 tablet (40  mg total) by mouth as needed. daily before meals (Patient not taking: Reported on 1/30/2024)      Ascorbic Acid (VITAMIN C OR) Take 1,000 mg by mouth 2 (two) times daily. (Patient not taking: Reported on 2/27/2024)      Calcium-Magnesium 200-100 MG Oral Tab Take 1 tablet by mouth nightly. (Patient not taking: Reported on 2/27/2024)      omega-3 fatty acids 1000 MG Oral Cap Take 1,000 mg by mouth daily. (Patient not taking: Reported on 2/27/2024)      Lactobacillus (PROBIOTIC ACIDOPHILUS OR) Take 2 tablets by mouth 2 (two) times daily. (Patient not taking: Reported on 2/27/2024)      Fexofenadine HCl 60 MG Oral Tab Take 1 tablet (60 mg total) by mouth daily. (Patient not taking: Reported on 2/27/2024)        Past Medical History:   Diagnosis Date    Abdominal pain, unspecified site 07/07/2011    Acute sinusitis, unspecified 08/20/2011    Allergic rhinitis, cause unspecified 3/29/2010    Bronchitis, not specified as acute or chronic 3/17/2011    Disequilibrium     Dizziness and giddiness     04/09/2012    Fuchs' corneal dystrophy     Influenza with other respiratory manifestations 02/07/2011    Other psoriasis 3/29/2010    Other urinary incontinence 3/29/2010    Pure hypercholesterolemia 3/29/2010    Sprain of neck 1/20/2011      Social History:  Social History     Socioeconomic History    Marital status: OTHER   Tobacco Use    Smoking status: Never    Smokeless tobacco: Never    Tobacco comments:     Current Non smoker    Vaping Use    Vaping Use: Never used   Substance and Sexual Activity    Alcohol use: No     Alcohol/week: 0.0 standard drinks of alcohol    Drug use: No    Sexual activity: Not Currently   Other Topics Concern    Exercise Yes     Comment: 4 x week         REVIEW OF SYSTEMS:   GENERAL HEALTH: feels well otherwise  SKIN: denies any unusual skin lesions or rashes  RESPIRATORY: denies shortness of breath with exertion  CARDIOVASCULAR: denies chest pain on exertion  GI: denies abdominal pain and denies  heartburn  NEURO: denies headaches    EXAM:   /72   Pulse 66   Temp 98.3 °F (36.8 °C) (Temporal)   Resp 18   Wt 228 lb (103.4 kg)   SpO2 95%   BMI 35.71 kg/m²   GENERAL: well developed, well nourished,in no apparent distress  SKIN: no rashes,no suspicious lesions  HEENT: atraumatic, normocephalic,ears and throat are clear  NECK: supple,no adenopathy,no bruits  LUNGS: clear to auscultation  CARDIO: RRR without murmur  GI: good BS's,no masses, HSM or tenderness  EXTREMITIES: no cyanosis, clubbing or edema    ASSESSMENT AND PLAN:     Encounter Diagnoses   Name Primary?    STEMI involving left anterior descending coronary artery (HCC) Yes    Postsurgical percutaneous transluminal coronary angioplasty (PTCA) status     Presence of drug-eluting stent in left circumflex coronary artery     Primary hypertension    Discussed future appt and cardiac rehab. Continue all meds and see me in 1 month.     No orders of the defined types were placed in this encounter.      Meds & Refills for this Visit:  Requested Prescriptions     Signed Prescriptions Disp Refills    losartan 25 MG Oral Tab 90 tablet 3     Sig: Take 1 tablet (25 mg total) by mouth daily.       Imaging & Consults:  None    Follow up as needed.    The patient indicates understanding of these issues and agrees to the plan.

## 2024-02-28 ENCOUNTER — TELEPHONE (OUTPATIENT)
Dept: FAMILY MEDICINE CLINIC | Facility: CLINIC | Age: 69
End: 2024-02-28

## 2024-02-28 NOTE — TELEPHONE ENCOUNTER
Spoke with patient and she verbalized understanding. She states she has no symptoms at this time.

## 2024-03-18 ENCOUNTER — LABORATORY ENCOUNTER (OUTPATIENT)
Dept: LAB | Age: 69
End: 2024-03-18
Attending: INTERNAL MEDICINE
Payer: MEDICARE

## 2024-03-18 DIAGNOSIS — I10 PRIMARY HYPERTENSION: ICD-10-CM

## 2024-03-18 DIAGNOSIS — E78.00 PURE HYPERCHOLESTEROLEMIA: ICD-10-CM

## 2024-03-18 LAB
ALBUMIN SERPL-MCNC: 4 G/DL (ref 3.4–5)
ALBUMIN/GLOB SERPL: 1.3 {RATIO} (ref 1–2)
ALP LIVER SERPL-CCNC: 71 U/L
ALT SERPL-CCNC: 34 U/L
ANION GAP SERPL CALC-SCNC: 4 MMOL/L (ref 0–18)
AST SERPL-CCNC: 19 U/L (ref 15–37)
BASOPHILS # BLD AUTO: 0.05 X10(3) UL (ref 0–0.2)
BASOPHILS NFR BLD AUTO: 0.8 %
BILIRUB SERPL-MCNC: 0.7 MG/DL (ref 0.1–2)
BUN BLD-MCNC: 12 MG/DL (ref 9–23)
CALCIUM BLD-MCNC: 9.1 MG/DL (ref 8.5–10.1)
CHLORIDE SERPL-SCNC: 110 MMOL/L (ref 98–112)
CHOLEST SERPL-MCNC: 129 MG/DL (ref ?–200)
CO2 SERPL-SCNC: 27 MMOL/L (ref 21–32)
CREAT BLD-MCNC: 0.72 MG/DL
EGFRCR SERPLBLD CKD-EPI 2021: 90 ML/MIN/1.73M2 (ref 60–?)
EOSINOPHIL # BLD AUTO: 0.19 X10(3) UL (ref 0–0.7)
EOSINOPHIL NFR BLD AUTO: 2.9 %
ERYTHROCYTE [DISTWIDTH] IN BLOOD BY AUTOMATED COUNT: 13.1 %
FASTING PATIENT LIPID ANSWER: YES
FASTING STATUS PATIENT QL REPORTED: YES
GLOBULIN PLAS-MCNC: 3.2 G/DL (ref 2.8–4.4)
GLUCOSE BLD-MCNC: 111 MG/DL (ref 70–99)
HCT VFR BLD AUTO: 41.8 %
HDLC SERPL-MCNC: 48 MG/DL (ref 40–59)
HGB BLD-MCNC: 13.9 G/DL
IMM GRANULOCYTES # BLD AUTO: 0.01 X10(3) UL (ref 0–1)
IMM GRANULOCYTES NFR BLD: 0.2 %
LDLC SERPL CALC-MCNC: 64 MG/DL (ref ?–100)
LYMPHOCYTES # BLD AUTO: 1.13 X10(3) UL (ref 1–4)
LYMPHOCYTES NFR BLD AUTO: 17 %
MCH RBC QN AUTO: 30 PG (ref 26–34)
MCHC RBC AUTO-ENTMCNC: 33.3 G/DL (ref 31–37)
MCV RBC AUTO: 90.1 FL
MONOCYTES # BLD AUTO: 0.78 X10(3) UL (ref 0.1–1)
MONOCYTES NFR BLD AUTO: 11.7 %
NEUTROPHILS # BLD AUTO: 4.48 X10 (3) UL (ref 1.5–7.7)
NEUTROPHILS # BLD AUTO: 4.48 X10(3) UL (ref 1.5–7.7)
NEUTROPHILS NFR BLD AUTO: 67.4 %
NONHDLC SERPL-MCNC: 81 MG/DL (ref ?–130)
OSMOLALITY SERPL CALC.SUM OF ELEC: 292 MOSM/KG (ref 275–295)
PLATELET # BLD AUTO: 256 10(3)UL (ref 150–450)
POTASSIUM SERPL-SCNC: 4 MMOL/L (ref 3.5–5.1)
PROT SERPL-MCNC: 7.2 G/DL (ref 6.4–8.2)
RBC # BLD AUTO: 4.64 X10(6)UL
SODIUM SERPL-SCNC: 141 MMOL/L (ref 136–145)
T4 FREE SERPL-MCNC: 1.2 NG/DL (ref 0.8–1.7)
TRIGL SERPL-MCNC: 91 MG/DL (ref 30–149)
TSI SER-ACNC: 0.48 MIU/ML (ref 0.36–3.74)
VLDLC SERPL CALC-MCNC: 14 MG/DL (ref 0–30)
WBC # BLD AUTO: 6.6 X10(3) UL (ref 4–11)

## 2024-03-18 PROCEDURE — 84439 ASSAY OF FREE THYROXINE: CPT

## 2024-03-18 PROCEDURE — 36415 COLL VENOUS BLD VENIPUNCTURE: CPT

## 2024-03-18 PROCEDURE — 85025 COMPLETE CBC W/AUTO DIFF WBC: CPT

## 2024-03-18 PROCEDURE — 80053 COMPREHEN METABOLIC PANEL: CPT

## 2024-03-18 PROCEDURE — 84443 ASSAY THYROID STIM HORMONE: CPT

## 2024-03-18 PROCEDURE — 80061 LIPID PANEL: CPT

## 2024-03-19 ENCOUNTER — OFFICE VISIT (OUTPATIENT)
Dept: FAMILY MEDICINE CLINIC | Facility: CLINIC | Age: 69
End: 2024-03-19
Payer: MEDICARE

## 2024-03-19 VITALS
OXYGEN SATURATION: 96 % | HEART RATE: 66 BPM | DIASTOLIC BLOOD PRESSURE: 74 MMHG | WEIGHT: 220.25 LBS | TEMPERATURE: 98 F | BODY MASS INDEX: 35 KG/M2 | SYSTOLIC BLOOD PRESSURE: 128 MMHG

## 2024-03-19 DIAGNOSIS — Z98.61 POSTSURGICAL PERCUTANEOUS TRANSLUMINAL CORONARY ANGIOPLASTY (PTCA) STATUS: Primary | ICD-10-CM

## 2024-03-19 DIAGNOSIS — F41.9 ANXIETY: ICD-10-CM

## 2024-03-19 PROCEDURE — G2211 COMPLEX E/M VISIT ADD ON: HCPCS | Performed by: INTERNAL MEDICINE

## 2024-03-19 PROCEDURE — 99213 OFFICE O/P EST LOW 20 MIN: CPT | Performed by: INTERNAL MEDICINE

## 2024-03-19 NOTE — PROGRESS NOTES
Armida Suarez is a 68 year old female.  HPI:   Pt had a stent placed.   Current Outpatient Medications   Medication Sig Dispense Refill    aspirin 81 MG Oral Tab EC Take 1 tablet (81 mg total) by mouth daily.      atorvastatin 80 MG Oral Tab Take 1 tablet (80 mg total) by mouth nightly.      carvedilol 3.125 MG Oral Tab Take 1 tablet (3.125 mg total) by mouth 2 (two) times daily with meals.      clopidogrel 75 MG Oral Tab Take 1 tablet (75 mg total) by mouth daily.      methenamine 1 g Oral Tab Take 1 tablet (1 g total) by mouth 2 (two) times daily.      losartan 25 MG Oral Tab Take 1 tablet (25 mg total) by mouth daily. 90 tablet 3    Coenzyme Q10 (COQ10) 100 MG Oral Cap       estradiol 0.1 MG/GM Vaginal Cream APPLY A PEA SIZED AMOUNT OF CREAM PER VAGINA TWICE WEEKLY      Milk Thistle 1000 MG Oral Cap       Pantoprazole Sodium 40 MG Oral Tab EC Take 1 tablet (40 mg total) by mouth as needed. daily before meals      Ascorbic Acid (VITAMIN C OR) Take 1,000 mg by mouth 2 (two) times daily.      omega-3 fatty acids 1000 MG Oral Cap Take 1,000 mg by mouth daily.      Lactobacillus (PROBIOTIC ACIDOPHILUS OR) Take 2 tablets by mouth 2 (two) times daily.      ibuprofen 600 MG Oral Tab Take 1 tablet (600 mg total) by mouth every 6 (six) hours as needed for Pain. (Patient not taking: Reported on 2/27/2024) 30 tablet 0      Past Medical History:   Diagnosis Date    Abdominal pain, unspecified site 07/07/2011    Acute sinusitis, unspecified 08/20/2011    Allergic rhinitis, cause unspecified 3/29/2010    Bronchitis, not specified as acute or chronic 3/17/2011    Disequilibrium     Dizziness and giddiness     04/09/2012    Fuchs' corneal dystrophy     Influenza with other respiratory manifestations 02/07/2011    Other psoriasis 3/29/2010    Other urinary incontinence 3/29/2010    Pure hypercholesterolemia 3/29/2010    Sprain of neck 1/20/2011      Social History:  Social History     Socioeconomic History    Marital status:  OTHER   Tobacco Use    Smoking status: Never    Smokeless tobacco: Never    Tobacco comments:     Current Non smoker    Vaping Use    Vaping Use: Never used   Substance and Sexual Activity    Alcohol use: No     Alcohol/week: 0.0 standard drinks of alcohol    Drug use: No    Sexual activity: Not Currently   Other Topics Concern    Exercise Yes     Comment: 4 x week         REVIEW OF SYSTEMS:   GENERAL HEALTH: feels well otherwise  SKIN: denies any unusual skin lesions or rashes  RESPIRATORY: denies shortness of breath with exertion  CARDIOVASCULAR: denies chest pain on exertion  GI: denies abdominal pain and denies heartburn  NEURO: denies headaches    EXAM:   /74   Pulse 66   Temp 98.1 °F (36.7 °C) (Temporal)   Wt 220 lb 4 oz (99.9 kg)   SpO2 96%   BMI 34.50 kg/m²   GENERAL: well developed, well nourished,in no apparent distress  SKIN: no rashes,no suspicious lesions  HEENT: atraumatic, normocephalic,ears and throat are clear  NECK: supple,no adenopathy,no bruits  LUNGS: clear to auscultation  CARDIO: RRR without murmur  GI: good BS's,no masses, HSM or tenderness  EXTREMITIES: no cyanosis, clubbing or edema    ASSESSMENT AND PLAN:   No diagnosis found.    No orders of the defined types were placed in this encounter.      Meds & Refills for this Visit:  Requested Prescriptions      No prescriptions requested or ordered in this encounter       Imaging & Consults:  None    Follow up as needed.    The patient indicates understanding of these issues and agrees to the plan.  The patient is asked to return in ***.

## 2024-03-19 NOTE — PROGRESS NOTES
Armida Suarez is a 68 year old female.    HPI:   Patient had stent placed and is here to review labwork. Patient is working on improving her diet and exercise. She has gotten rid of sugar in her diet and is watching her portion control. She states she is overall feeling really good and has her stress test on Friday to get a baseline to start cardiac rehab. She has follow up with cardiology in May.     Current Outpatient Medications   Medication Sig Dispense Refill    aspirin 81 MG Oral Tab EC Take 1 tablet (81 mg total) by mouth daily.      atorvastatin 80 MG Oral Tab Take 1 tablet (80 mg total) by mouth nightly.      carvedilol 3.125 MG Oral Tab Take 1 tablet (3.125 mg total) by mouth 2 (two) times daily with meals.      clopidogrel 75 MG Oral Tab Take 1 tablet (75 mg total) by mouth daily.      methenamine 1 g Oral Tab Take 1 tablet (1 g total) by mouth 2 (two) times daily.      losartan 25 MG Oral Tab Take 1 tablet (25 mg total) by mouth daily. 90 tablet 3    Coenzyme Q10 (COQ10) 100 MG Oral Cap       estradiol 0.1 MG/GM Vaginal Cream APPLY A PEA SIZED AMOUNT OF CREAM PER VAGINA TWICE WEEKLY      Milk Thistle 1000 MG Oral Cap       Pantoprazole Sodium 40 MG Oral Tab EC Take 1 tablet (40 mg total) by mouth as needed. daily before meals      Ascorbic Acid (VITAMIN C OR) Take 1,000 mg by mouth 2 (two) times daily.      omega-3 fatty acids 1000 MG Oral Cap Take 1,000 mg by mouth daily.      Lactobacillus (PROBIOTIC ACIDOPHILUS OR) Take 2 tablets by mouth 2 (two) times daily.      ibuprofen 600 MG Oral Tab Take 1 tablet (600 mg total) by mouth every 6 (six) hours as needed for Pain. (Patient not taking: Reported on 2/27/2024) 30 tablet 0      Past Medical History:   Diagnosis Date    Abdominal pain, unspecified site 07/07/2011    Acute sinusitis, unspecified 08/20/2011    Allergic rhinitis, cause unspecified 3/29/2010    Bronchitis, not specified as acute or chronic 3/17/2011    Disequilibrium     Dizziness and  giddiness     04/09/2012    Fuchs' corneal dystrophy     Influenza with other respiratory manifestations 02/07/2011    Other psoriasis 3/29/2010    Other urinary incontinence 3/29/2010    Pure hypercholesterolemia 3/29/2010    Sprain of neck 1/20/2011      Social History:  Social History     Socioeconomic History    Marital status: OTHER   Tobacco Use    Smoking status: Never    Smokeless tobacco: Never    Tobacco comments:     Current Non smoker    Vaping Use    Vaping Use: Never used   Substance and Sexual Activity    Alcohol use: No     Alcohol/week: 0.0 standard drinks of alcohol    Drug use: No    Sexual activity: Not Currently   Other Topics Concern    Exercise Yes     Comment: 4 x week         REVIEW OF SYSTEMS:   GENERAL HEALTH: feels well otherwise  SKIN: denies any unusual skin lesions or rashes  RESPIRATORY: denies shortness of breath with exertion  CARDIOVASCULAR: denies chest pain on exertion  GI: denies abdominal pain and denies heartburn  NEURO: denies headaches    EXAM:   /74   Pulse 66   Temp 98.1 °F (36.7 °C) (Temporal)   Wt 220 lb 4 oz (99.9 kg)   SpO2 96%   BMI 34.50 kg/m²   GENERAL: well developed, well nourished,in no apparent distress  SKIN: no rashes,no suspicious lesions  HEENT: atraumatic, normocephalic,ears and throat are clear  NECK: supple,no adenopathy,no bruits  LUNGS: clear to auscultation  CARDIO: RRR without murmur  GI: good BS's,no masses, HSM or tenderness  EXTREMITIES: no cyanosis, clubbing or edema    ASSESSMENT AND PLAN:     Discussion surrounding work restrictions and post stent care,  Meds reviewed and discussed side effects.     No orders of the defined types were placed in this encounter.      Meds & Refills for this Visit:  Requested Prescriptions      No prescriptions requested or ordered in this encounter       Imaging & Consults:  None    Follow up as needed.    The patient indicates understanding of these issues and agrees to the plan.  The patient is asked  to return as needed.

## 2024-03-22 NOTE — PROGRESS NOTES
Pt has been back to work she has been doing 3 90 minute massages and resting 30 minutes in between, by the third massage she is excessively tired.  No chest pain or SOB.  I encouraged her to cut back to only 1 or 2 massages until she completes her cardiac rehab. No new meds and no labs drawn today. Offered psychosocial support and discussed depression/anxiety after heart intervention.

## 2024-04-02 ENCOUNTER — OFFICE VISIT (OUTPATIENT)
Dept: FAMILY MEDICINE CLINIC | Facility: CLINIC | Age: 69
End: 2024-04-02
Payer: MEDICARE

## 2024-04-02 VITALS
SYSTOLIC BLOOD PRESSURE: 118 MMHG | RESPIRATION RATE: 18 BRPM | WEIGHT: 217 LBS | DIASTOLIC BLOOD PRESSURE: 64 MMHG | BODY MASS INDEX: 34 KG/M2 | OXYGEN SATURATION: 96 % | HEART RATE: 71 BPM | TEMPERATURE: 99 F

## 2024-04-02 DIAGNOSIS — I25.10 CORONARY ARTERY DISEASE INVOLVING NATIVE CORONARY ARTERY OF NATIVE HEART WITHOUT ANGINA PECTORIS: ICD-10-CM

## 2024-04-02 DIAGNOSIS — J04.0 LARYNGITIS: Primary | ICD-10-CM

## 2024-04-02 DIAGNOSIS — N39.0 URINARY TRACT INFECTION WITHOUT HEMATURIA, SITE UNSPECIFIED: ICD-10-CM

## 2024-04-02 PROCEDURE — G2211 COMPLEX E/M VISIT ADD ON: HCPCS | Performed by: INTERNAL MEDICINE

## 2024-04-02 PROCEDURE — 99214 OFFICE O/P EST MOD 30 MIN: CPT | Performed by: INTERNAL MEDICINE

## 2024-04-02 RX ORDER — BENZONATATE 200 MG/1
200 CAPSULE ORAL 3 TIMES DAILY PRN
Qty: 30 CAPSULE | Refills: 0 | Status: SHIPPED | OUTPATIENT
Start: 2024-04-02 | End: 2024-04-12

## 2024-04-02 RX ORDER — NITROFURANTOIN 25; 75 MG/1; MG/1
100 CAPSULE ORAL 2 TIMES DAILY
COMMUNITY
Start: 2024-03-28

## 2024-04-02 NOTE — PROGRESS NOTES
HPI:   Armida Suarez is a 68 year old female who presents for upper respiratory symptoms for  5  days. Patient reports congestion, dry cough, loss of voice .  Covid swab negative    Influenza swab negative     XR Chest    History: Female, 68 years old. Cough, persistent; Acute cough.    Comparison: No prior exam.    Findings:    No focal consolidation, pleural effusion, or pneumothorax. Heart size and mediastinal contours are within normal limits. Age-indeterminate, chronic appearing compression deformities in the thoracic spine.    She was already treated for a UTI  Culture Result 50,000 - 100,000 CFU/mL Escherichia coli (A)     03/31/2024 7:05 AM CDT [facility]     Comment:     Lab Results - (ABNORMAL) CULTURE URINE (03/28/2024 12:18 PM CDT)  Specimen (Source) Anatomical Location / Laterality Collection Method / Volume Collection Time Received Time   Urine URINE SPECIMEN / Unknown   03/28/2024 12:18 PM CDT 03/28/2024 12:18 PM CDT     Lab Results - (ABNORMAL) CULTURE URINE (03/28/2024 12:18 PM CDT)  Narrative         Lab Results - (ABNORMAL) CULTURE URINE (03/28/2024 12:18 PM CDT)  Organism Antibiotic Method Susceptibility   Escherichia coli Amoxicillin + Clavulanate MARIEL >=32 mcg/mL: Resistant    Escherichia coli Ampicillin MARIEL >=32 mcg/mL: Resistant    Escherichia coli Ampicillin + Sulbactam MARIEL >=32 mcg/mL: Resistant    Escherichia coli Cefazolin MARIEL >=64 mcg/mL: Resistant    Escherichia coli Cefepime MARIEL <=1 mcg/mL: Susceptible    Escherichia coli Ceftazidime MARIEL <=1 mcg/mL: Susceptible    Escherichia coli Ceftriaxone MARIEL <=1 mcg/mL: Susceptible    Escherichia coli Ciprofloxacin MARIEL <=0.25 mcg/mL: Susceptible    Escherichia coli Ertapenem MARIEL <=0.5 mcg/mL: Susceptible    Escherichia coli Gentamicin MARIEL <=1 mcg/mL: Susceptible    Escherichia coli Levofloxacin MARIEL <=0.12 mcg/mL: Susceptible    Escherichia coli Nitrofurantoin MARIEL <=16 mcg/mL: Susceptible    Escherichia coli Piperacillin +  Tazobactam MARIEL 8 mcg/mL: Susceptible    Escherichia coli Tobramycin MARIEL <=1 mcg/mL: Susceptible    Escherichia coli Trimethoprim + Sulfamethoxazole MARIEL <=20 mcg/mL: Susceptible      Lab Results - (ABNORMAL) CULTURE URINE (03/28/2024 12:18 PM CDT)  Authorizing Provider Result Type       Tc Bustillo MD - 03/29/2024  Formatting of this note might be different from the original.  XR Chest 2 Views    History: Female, 68 years old. Cough, persistent; Acute cough.    Comparison: No prior exam.    Findings:    No focal consolidation, pleural effusion, or pneumothorax. Heart size and mediastinal contours are within normal limits. Age-indeterminate, chronic appearing compression deformities in the thoracic spine.    IMPRESSION:  Impression:    No acute pulmonary process.  Current Outpatient Medications   Medication Sig Dispense Refill    aspirin 81 MG Oral Tab EC Take 1 tablet (81 mg total) by mouth daily.      atorvastatin 80 MG Oral Tab Take 1 tablet (80 mg total) by mouth nightly.      carvedilol 3.125 MG Oral Tab Take 1 tablet (3.125 mg total) by mouth 2 (two) times daily with meals.      clopidogrel 75 MG Oral Tab Take 1 tablet (75 mg total) by mouth daily.      methenamine 1 g Oral Tab Take 1 tablet (1 g total) by mouth 2 (two) times daily.      losartan 25 MG Oral Tab Take 1 tablet (25 mg total) by mouth daily. 90 tablet 3    ibuprofen 600 MG Oral Tab Take 1 tablet (600 mg total) by mouth every 6 (six) hours as needed for Pain. (Patient not taking: Reported on 2/27/2024) 30 tablet 0    Coenzyme Q10 (COQ10) 100 MG Oral Cap       estradiol 0.1 MG/GM Vaginal Cream APPLY A PEA SIZED AMOUNT OF CREAM PER VAGINA TWICE WEEKLY      Milk Thistle 1000 MG Oral Cap       Pantoprazole Sodium 40 MG Oral Tab EC Take 1 tablet (40 mg total) by mouth as needed. daily before meals      Ascorbic Acid (VITAMIN C OR) Take 1,000 mg by mouth 2 (two) times daily.      omega-3 fatty acids 1000 MG Oral Cap Take 1,000 mg by mouth daily.       Lactobacillus (PROBIOTIC ACIDOPHILUS OR) Take 2 tablets by mouth 2 (two) times daily.        Past Medical History:   Diagnosis Date    Abdominal pain, unspecified site 07/07/2011    Acute sinusitis, unspecified 08/20/2011    Allergic rhinitis, cause unspecified 3/29/2010    Bronchitis, not specified as acute or chronic 3/17/2011    Disequilibrium     Dizziness and giddiness     04/09/2012    Fuchs' corneal dystrophy     Influenza with other respiratory manifestations 02/07/2011    Other psoriasis 3/29/2010    Other urinary incontinence 3/29/2010    Pure hypercholesterolemia 3/29/2010    Sprain of neck 1/20/2011      Past Surgical History:   Procedure Laterality Date    HYSTERECTOMY      Complete (no cancer)     OOPHORECTOMY        Family History   Problem Relation Age of Onset    Other (Other) Mother         MVA    Breast Cancer Maternal Aunt 60    Breast Cancer Maternal Aunt 50    Pulmonary Disease Other         Grandmother with PUD     Cancer Other         Lung cancer    Breast Cancer Cousin 50      Social History     Socioeconomic History    Marital status: OTHER   Tobacco Use    Smoking status: Never    Smokeless tobacco: Never    Tobacco comments:     Current Non smoker    Vaping Use    Vaping Use: Never used   Substance and Sexual Activity    Alcohol use: No     Alcohol/week: 0.0 standard drinks of alcohol    Drug use: No    Sexual activity: Not Currently   Other Topics Concern    Exercise Yes     Comment: 4 x week          REVIEW OF SYSTEMS:   GENERAL: feels well otherwise  SKIN: no rashes  EYES:denies blurred vision or double vision  HEENT: congested  LUNGS: denies shortness of breath with exertion  CARDIOVASCULAR: denies chest pain on exertion  GI: no nausea or abdominal pain  NEURO: denies headaches    EXAM:   There were no vitals taken for this visit.  GENERAL: well developed, well nourished,in no apparent distress  SKIN: no rashes,no suspicious lesions  EYES:PERRLA, EOMI, normal optic disk,conjunctiva  are clear  HEENT: atraumatic, normocephalic,ears and throat are clear  NECK: supple,no adenopathy,no bruits  LUNGS: clear to auscultation  CARDIO: RRR without murmur  GI: good BS's,no masses, HSM or tenderness    ASSESSMENT AND PLAN:   Armida Suarez is a 68 year old female who presents with  UTI and laryngitis . PLAN: OTC decongestants, throat lozenges and tylenol and keflex of UTI .  The patient indicates understanding of these issues and agrees to the plan.  The patient is asked to return if sx's persist or worsen.

## 2024-06-04 ENCOUNTER — TELEPHONE (OUTPATIENT)
Dept: FAMILY MEDICINE CLINIC | Facility: CLINIC | Age: 69
End: 2024-06-04

## 2024-06-04 DIAGNOSIS — R73.02 IMPAIRED GLUCOSE TOLERANCE: ICD-10-CM

## 2024-06-04 DIAGNOSIS — I25.10 CORONARY ARTERY DISEASE INVOLVING NATIVE CORONARY ARTERY OF NATIVE HEART WITHOUT ANGINA PECTORIS: Primary | ICD-10-CM

## 2024-06-04 NOTE — TELEPHONE ENCOUNTER
Patient scheduled wellness in August.  She wants to do labs a few days earlier.  Appointment for labs scheduled, please order labs.  No call back needed unless there is an issue.

## 2024-07-25 ENCOUNTER — OFFICE VISIT (OUTPATIENT)
Dept: FAMILY MEDICINE CLINIC | Facility: CLINIC | Age: 69
End: 2024-07-25
Payer: MEDICARE

## 2024-07-25 VITALS
DIASTOLIC BLOOD PRESSURE: 69 MMHG | RESPIRATION RATE: 22 BRPM | BODY MASS INDEX: 33.07 KG/M2 | TEMPERATURE: 101 F | HEART RATE: 70 BPM | OXYGEN SATURATION: 96 % | HEIGHT: 67.5 IN | SYSTOLIC BLOOD PRESSURE: 139 MMHG | WEIGHT: 213.19 LBS

## 2024-07-25 DIAGNOSIS — R05.1 ACUTE COUGH: ICD-10-CM

## 2024-07-25 DIAGNOSIS — J01.10 ACUTE NON-RECURRENT FRONTAL SINUSITIS: Primary | ICD-10-CM

## 2024-07-25 LAB
COVID19 BINAX NOW ANTIGEN: NOT DETECTED
OPERATOR ID: NORMAL

## 2024-07-25 PROCEDURE — 99214 OFFICE O/P EST MOD 30 MIN: CPT

## 2024-07-25 RX ORDER — BENZONATATE 100 MG/1
CAPSULE ORAL
COMMUNITY
Start: 2024-03-29

## 2024-07-25 RX ORDER — BENZONATATE 200 MG/1
200 CAPSULE ORAL 3 TIMES DAILY PRN
Qty: 21 CAPSULE | Refills: 0 | Status: SHIPPED | OUTPATIENT
Start: 2024-07-25 | End: 2024-08-01

## 2024-07-25 RX ORDER — CHOLECALCIFEROL (VITAMIN D3) 25 MCG
CAPSULE ORAL
COMMUNITY

## 2024-07-25 RX ORDER — AMOXICILLIN AND CLAVULANATE POTASSIUM 875; 125 MG/1; MG/1
1 TABLET, FILM COATED ORAL 2 TIMES DAILY
Qty: 20 TABLET | Refills: 0 | Status: SHIPPED | OUTPATIENT
Start: 2024-07-25 | End: 2024-08-04

## 2024-07-25 RX ORDER — PREDNISONE 20 MG/1
20 TABLET ORAL 2 TIMES DAILY
Qty: 10 TABLET | Refills: 0 | Status: SHIPPED | OUTPATIENT
Start: 2024-07-25 | End: 2024-07-30

## 2024-07-25 NOTE — PROGRESS NOTES
Armida Suarez is a 69 year old female.  HPI:     Patient in office for cough, sinus pressure and fever that started yesterday.  She has tried 1 dose of mucinex without relief and took tessalon pears with minimal relief.  She is going to her granddaughters wedding in 2 days and rehearsal dinner is tonight and she is concerned about being sick around others.      Current Outpatient Medications   Medication Sig Dispense Refill    nitrofurantoin monohydrate macro 100 MG Oral Cap Take 1 capsule (100 mg total) by mouth 2 (two) times daily.      aspirin 81 MG Oral Tab EC Take 1 tablet (81 mg total) by mouth daily.      atorvastatin 80 MG Oral Tab Take 1 tablet (80 mg total) by mouth nightly.      carvedilol 3.125 MG Oral Tab Take 1 tablet (3.125 mg total) by mouth 2 (two) times daily with meals.      clopidogrel 75 MG Oral Tab Take 1 tablet (75 mg total) by mouth daily.      methenamine 1 g Oral Tab Take 1 tablet (1 g total) by mouth 2 (two) times daily. (Patient not taking: Reported on 4/2/2024)      losartan 25 MG Oral Tab Take 1 tablet (25 mg total) by mouth daily. 90 tablet 3    ibuprofen 600 MG Oral Tab Take 1 tablet (600 mg total) by mouth every 6 (six) hours as needed for Pain. (Patient not taking: Reported on 2/27/2024) 30 tablet 0    Coenzyme Q10 (COQ10) 100 MG Oral Cap       estradiol 0.1 MG/GM Vaginal Cream APPLY A PEA SIZED AMOUNT OF CREAM PER VAGINA TWICE WEEKLY      Milk Thistle 1000 MG Oral Cap       Pantoprazole Sodium 40 MG Oral Tab EC Take 1 tablet (40 mg total) by mouth as needed. daily before meals      Ascorbic Acid (VITAMIN C OR) Take 1,000 mg by mouth 2 (two) times daily.      omega-3 fatty acids 1000 MG Oral Cap Take 1,000 mg by mouth daily.      Lactobacillus (PROBIOTIC ACIDOPHILUS OR) Take 2 tablets by mouth 2 (two) times daily.        Past Medical History:    Abdominal pain, unspecified site    Acute sinusitis, unspecified    Allergic rhinitis, cause unspecified    Bronchitis, not specified  as acute or chronic    Disequilibrium    Dizziness and giddiness    04/09/2012    Fuchs' corneal dystrophy    Influenza with other respiratory manifestations    Other psoriasis    Other urinary incontinence    Pure hypercholesterolemia    Sprain of neck      Social History:  Social History     Socioeconomic History    Marital status: OTHER   Tobacco Use    Smoking status: Never    Smokeless tobacco: Never    Tobacco comments:     Current Non smoker    Vaping Use    Vaping status: Never Used   Substance and Sexual Activity    Alcohol use: No     Alcohol/week: 0.0 standard drinks of alcohol    Drug use: No    Sexual activity: Not Currently   Other Topics Concern    Exercise Yes     Comment: 4 x week      Social Determinants of Health     Food Insecurity: Low Risk  (2/18/2024)    Received from [facility]    Food Insecurity     Have there been times that your food ran out, and you didn't have money to get more?: No     Are there times that you worry that this might happen?: No   Transportation Needs: Low Risk  (2/18/2024)    Received from [facility]    Transportation Needs     Do you have trouble getting transportation to medical appointments?: No   Housing Stability: Low Risk  (2/18/2024)    Received from [facility]    Housing Stability     Are you concerned about having a safe and reliable place to live?: No          REVIEW OF SYSTEMS:     Review of Systems   Constitutional:  Positive for fever. Negative for activity change and appetite change.   HENT:  Positive for sinus pressure and sinus pain. Negative for congestion, hearing loss and sore throat.    Eyes:  Negative for discharge and redness.   Respiratory:  Positive for cough. Negative for shortness of breath and wheezing.    Cardiovascular:  Negative for chest pain.   Gastrointestinal:  Negative for abdominal distention and abdominal pain.   Endocrine: Negative for cold intolerance and heat  intolerance.   Genitourinary:  Negative for difficulty urinating and urgency.   Musculoskeletal:  Negative for arthralgias and back pain.   Skin:  Negative for color change.   Allergic/Immunologic: Negative for environmental allergies.   Neurological:  Positive for headaches. Negative for dizziness and syncope.   Hematological:  Negative for adenopathy. Does not bruise/bleed easily.   Psychiatric/Behavioral:  Negative for agitation, behavioral problems and confusion.        EXAM:   /70   Pulse 70   Temp (!) 100.5 °F (38.1 °C) (Temporal)   Resp 22   Ht 5' 7.5\" (1.715 m)   Wt 213 lb 3.2 oz (96.7 kg)   SpO2 96%   BMI 32.90 kg/m²     Physical Exam  Constitutional:       Appearance: Normal appearance.   HENT:      Head: Normocephalic and atraumatic.      Right Ear: A middle ear effusion is present. Tympanic membrane is retracted.      Left Ear: A middle ear effusion is present. Tympanic membrane is retracted.      Nose: Congestion and rhinorrhea present.      Right Turbinates: Pale.      Left Turbinates: Pale.      Right Sinus: Maxillary sinus tenderness and frontal sinus tenderness present.      Left Sinus: Maxillary sinus tenderness and frontal sinus tenderness present.      Mouth/Throat:      Mouth: Mucous membranes are moist.      Pharynx: Oropharynx is clear.   Eyes:      Extraocular Movements: Extraocular movements intact.      Conjunctiva/sclera: Conjunctivae normal.      Pupils: Pupils are equal, round, and reactive to light.   Cardiovascular:      Rate and Rhythm: Normal rate and regular rhythm.      Pulses: Normal pulses.      Heart sounds: Normal heart sounds.   Pulmonary:      Effort: Pulmonary effort is normal.      Breath sounds: Normal breath sounds.   Musculoskeletal:         General: Normal range of motion.      Cervical back: Normal range of motion.   Lymphadenopathy:      Cervical: Cervical adenopathy present.   Skin:     General: Skin is warm and dry.      Capillary Refill: Capillary  refill takes less than 2 seconds.   Neurological:      Mental Status: She is alert and oriented to person, place, and time.   Psychiatric:         Mood and Affect: Mood normal.         Behavior: Behavior normal.          ASSESSMENT AND PLAN:     1. Acute non-recurrent frontal sinusitis  Augmentin sent to pharmacy and instructions provided.  Patient said she may only take a few days until she feels better.  Spoke with patient about needs to finish antibiotic due to bacteria becoming resistant.  Recommended taking at least 7 days of antibiotic  - amoxicillin clavulanate 875-125 MG Oral Tab; Take 1 tablet by mouth 2 (two) times daily for 10 days.  Dispense: 20 tablet; Refill: 0    2. Acute cough  Rapid COVID done and negative  - COVID19 BinaxNOW Antigen    Spoke with patient on upper respiratory infection and that majority of upper respiratory infections are viral. Viral infections do not respond to antibiotics. The choice to try an antibiotic is based on the length of time the URI has persisted as well as symptoms of sinus pressure, headache, fever, thick post nasal drip and purulent appearing nasal drainage from the sinus.      The patient indicates understanding of these issues and agrees to the plan.      Yarely Doe, APRN  7/25/2024

## 2024-08-20 ENCOUNTER — LABORATORY ENCOUNTER (OUTPATIENT)
Dept: LAB | Age: 69
End: 2024-08-20
Attending: INTERNAL MEDICINE
Payer: MEDICARE

## 2024-08-20 DIAGNOSIS — R73.02 IMPAIRED GLUCOSE TOLERANCE: ICD-10-CM

## 2024-08-20 DIAGNOSIS — I25.10 CORONARY ARTERY DISEASE INVOLVING NATIVE CORONARY ARTERY OF NATIVE HEART WITHOUT ANGINA PECTORIS: ICD-10-CM

## 2024-08-20 LAB
ALBUMIN SERPL-MCNC: 4.4 G/DL (ref 3.2–4.8)
ALBUMIN/GLOB SERPL: 1.7 {RATIO} (ref 1–2)
ALP LIVER SERPL-CCNC: 76 U/L
ALT SERPL-CCNC: 17 U/L
ANION GAP SERPL CALC-SCNC: 5 MMOL/L (ref 0–18)
AST SERPL-CCNC: 20 U/L (ref ?–34)
BASOPHILS # BLD AUTO: 0.04 X10(3) UL (ref 0–0.2)
BASOPHILS NFR BLD AUTO: 0.7 %
BILIRUB SERPL-MCNC: 0.6 MG/DL (ref 0.2–1.1)
BUN BLD-MCNC: 9 MG/DL (ref 9–23)
CALCIUM BLD-MCNC: 9.7 MG/DL (ref 8.7–10.4)
CHLORIDE SERPL-SCNC: 110 MMOL/L (ref 98–112)
CHOLEST SERPL-MCNC: 137 MG/DL (ref ?–200)
CO2 SERPL-SCNC: 26 MMOL/L (ref 21–32)
CREAT BLD-MCNC: 0.62 MG/DL
EGFRCR SERPLBLD CKD-EPI 2021: 96 ML/MIN/1.73M2 (ref 60–?)
EOSINOPHIL # BLD AUTO: 0.34 X10(3) UL (ref 0–0.7)
EOSINOPHIL NFR BLD AUTO: 5.8 %
ERYTHROCYTE [DISTWIDTH] IN BLOOD BY AUTOMATED COUNT: 13.5 %
EST. AVERAGE GLUCOSE BLD GHB EST-MCNC: 137 MG/DL (ref 68–126)
FASTING PATIENT LIPID ANSWER: YES
FASTING STATUS PATIENT QL REPORTED: YES
GLOBULIN PLAS-MCNC: 2.6 G/DL (ref 2–3.5)
GLUCOSE BLD-MCNC: 101 MG/DL (ref 70–99)
HBA1C MFR BLD: 6.4 % (ref ?–5.7)
HCT VFR BLD AUTO: 40.7 %
HDLC SERPL-MCNC: 49 MG/DL (ref 40–59)
HGB BLD-MCNC: 13.5 G/DL
IMM GRANULOCYTES # BLD AUTO: 0.01 X10(3) UL (ref 0–1)
IMM GRANULOCYTES NFR BLD: 0.2 %
LDLC SERPL CALC-MCNC: 74 MG/DL (ref ?–100)
LYMPHOCYTES # BLD AUTO: 1.22 X10(3) UL (ref 1–4)
LYMPHOCYTES NFR BLD AUTO: 20.9 %
MCH RBC QN AUTO: 30.1 PG (ref 26–34)
MCHC RBC AUTO-ENTMCNC: 33.2 G/DL (ref 31–37)
MCV RBC AUTO: 90.8 FL
MONOCYTES # BLD AUTO: 0.74 X10(3) UL (ref 0.1–1)
MONOCYTES NFR BLD AUTO: 12.6 %
NEUTROPHILS # BLD AUTO: 3.5 X10 (3) UL (ref 1.5–7.7)
NEUTROPHILS # BLD AUTO: 3.5 X10(3) UL (ref 1.5–7.7)
NEUTROPHILS NFR BLD AUTO: 59.8 %
NONHDLC SERPL-MCNC: 88 MG/DL (ref ?–130)
OSMOLALITY SERPL CALC.SUM OF ELEC: 291 MOSM/KG (ref 275–295)
PLATELET # BLD AUTO: 296 10(3)UL (ref 150–450)
POTASSIUM SERPL-SCNC: 4 MMOL/L (ref 3.5–5.1)
PROT SERPL-MCNC: 7 G/DL (ref 5.7–8.2)
RBC # BLD AUTO: 4.48 X10(6)UL
SODIUM SERPL-SCNC: 141 MMOL/L (ref 136–145)
TRIGL SERPL-MCNC: 71 MG/DL (ref 30–149)
VLDLC SERPL CALC-MCNC: 11 MG/DL (ref 0–30)
WBC # BLD AUTO: 5.9 X10(3) UL (ref 4–11)

## 2024-08-20 PROCEDURE — 80053 COMPREHEN METABOLIC PANEL: CPT

## 2024-08-20 PROCEDURE — 36415 COLL VENOUS BLD VENIPUNCTURE: CPT

## 2024-08-20 PROCEDURE — 83036 HEMOGLOBIN GLYCOSYLATED A1C: CPT

## 2024-08-20 PROCEDURE — 85025 COMPLETE CBC W/AUTO DIFF WBC: CPT

## 2024-08-20 PROCEDURE — 80061 LIPID PANEL: CPT

## 2024-08-21 ENCOUNTER — OFFICE VISIT (OUTPATIENT)
Dept: FAMILY MEDICINE CLINIC | Facility: CLINIC | Age: 69
End: 2024-08-21
Payer: MEDICARE

## 2024-08-21 VITALS
BODY MASS INDEX: 33.04 KG/M2 | HEIGHT: 67.5 IN | DIASTOLIC BLOOD PRESSURE: 80 MMHG | HEART RATE: 70 BPM | TEMPERATURE: 98 F | SYSTOLIC BLOOD PRESSURE: 118 MMHG | OXYGEN SATURATION: 96 % | RESPIRATION RATE: 16 BRPM | WEIGHT: 213 LBS

## 2024-08-21 DIAGNOSIS — Z00.00 ENCOUNTER FOR ANNUAL HEALTH EXAMINATION: ICD-10-CM

## 2024-08-21 DIAGNOSIS — I10 PRIMARY HYPERTENSION: ICD-10-CM

## 2024-08-21 DIAGNOSIS — G25.81 RESTLESS LEGS SYNDROME (RLS): Primary | ICD-10-CM

## 2024-08-21 DIAGNOSIS — Z95.5 PRESENCE OF DRUG-ELUTING STENT IN LEFT CIRCUMFLEX CORONARY ARTERY: ICD-10-CM

## 2024-08-21 DIAGNOSIS — Z12.31 VISIT FOR SCREENING MAMMOGRAM: ICD-10-CM

## 2024-08-21 DIAGNOSIS — G47.33 OSA (OBSTRUCTIVE SLEEP APNEA): ICD-10-CM

## 2024-08-21 DIAGNOSIS — M62.89 PELVIC FLOOR DYSFUNCTION: ICD-10-CM

## 2024-08-21 DIAGNOSIS — E78.00 PURE HYPERCHOLESTEROLEMIA: ICD-10-CM

## 2024-08-21 PROCEDURE — G0439 PPPS, SUBSEQ VISIT: HCPCS | Performed by: INTERNAL MEDICINE

## 2024-08-21 NOTE — PROGRESS NOTES
Subjective:   Armida Suarez is a 69 year old female who presents for a Medicare Subsequent Annual Wellness visit (Pt already had Initial Annual Wellness) and scheduled follow up of multiple significant but stable problems.   She has had chronic recurrent UTI after a urethral sling.  She had a stent placed 2/19/2024 in the LAD.     History/Other:   Fall Risk Assessment:   She has been screened for Falls and is low risk.      Cognitive Assessment:   She had a completely normal cognitive assessment - see flowsheet entries     Functional Ability/Status:   Armida Suarez has some abnormal functions as listed below:  She has Vision problems based on screening of functional status.       Depression Screening (PHQ):  PHQ-2 SCORE: 0  , done 8/14/2024        <5 minutes spent screening and counseling for depression    Advanced Directives:   She does NOT have a Living Will. [Do you have a living will?: No]  She does NOT have a Power of  for Health Care. [Do you have a healthcare power of ?: No]  Discussed Advance Care Planning with patient (and family/surrogate if present). Standard forms made available to patient in After Visit Summary.      Patient Active Problem List   Diagnosis    Pure hypercholesterolemia    Other psoriasis    Allergic rhinitis, unspecified    Other specified urinary incontinence    Herpes simplex virus type 1 (HSV-1) dermatitis    Cellulitis of left upper extremity    BHANU (obstructive sleep apnea)    Restless legs syndrome (RLS)    Pelvic floor dysfunction    Urinary urgency    Urinary frequency    Urge urinary incontinence    Overactive bladder    Primary hypertension    Stress incontinence in female    SOB (shortness of breath)    Postsurgical percutaneous transluminal coronary angioplasty (PTCA) status    Presence of drug-eluting stent in left circumflex coronary artery     Allergies:  She is allergic to ropinirole and seasonal.    Current Medications:  Outpatient Medications  Marked as Taking for the 8/21/24 encounter (Office Visit) with Ashely Morel MD   Medication Sig    Cholecalciferol (VITAMIN D-3) 25 MCG (1000 UT) Oral Cap     nitrofurantoin monohydrate macro 100 MG Oral Cap Take 1 capsule (100 mg total) by mouth 2 (two) times daily.    aspirin 81 MG Oral Tab EC Take 1 tablet (81 mg total) by mouth daily.    atorvastatin 80 MG Oral Tab Take 1 tablet (80 mg total) by mouth nightly.    carvedilol 3.125 MG Oral Tab Take 1 tablet (3.125 mg total) by mouth 2 (two) times daily with meals.    clopidogrel 75 MG Oral Tab Take 1 tablet (75 mg total) by mouth daily.    methenamine 1 g Oral Tab Take 1 tablet (1 g total) by mouth 2 (two) times daily.    losartan 25 MG Oral Tab Take 1 tablet (25 mg total) by mouth daily.    Coenzyme Q10 (COQ10) 100 MG Oral Cap     estradiol 0.1 MG/GM Vaginal Cream APPLY A PEA SIZED AMOUNT OF CREAM PER VAGINA TWICE WEEKLY    Milk Thistle 1000 MG Oral Cap     Pantoprazole Sodium 40 MG Oral Tab EC Take 1 tablet (40 mg total) by mouth as needed. daily before meals    Ascorbic Acid (VITAMIN C OR) Take 1,000 mg by mouth 2 (two) times daily.    Lactobacillus (PROBIOTIC ACIDOPHILUS OR) Take 2 tablets by mouth 2 (two) times daily.       Medical History:  She  has a past medical history of Abdominal pain, unspecified site (07/07/2011), Acute sinusitis, unspecified (08/20/2011), Allergic rhinitis, cause unspecified (3/29/2010), Bronchitis, not specified as acute or chronic (3/17/2011), Disequilibrium, Dizziness and giddiness, Fuchs' corneal dystrophy, Influenza with other respiratory manifestations (02/07/2011), Other psoriasis (3/29/2010), Other urinary incontinence (3/29/2010), Pure hypercholesterolemia (3/29/2010), and Sprain of neck (1/20/2011).  Surgical History:  She  has a past surgical history that includes hysterectomy and oophorectomy.   Family History:  Her family history includes Breast Cancer (age of onset: 50) in her cousin and maternal aunt; Breast Cancer  (age of onset: 60) in her maternal aunt; Cancer in an other family member; Other in her mother; Pulmonary Disease in an other family member.  Social History:  She  reports that she has never smoked. She has never used smokeless tobacco. She reports that she does not drink alcohol and does not use drugs.    Tobacco:  She has never smoked tobacco.    CAGE Alcohol Screen:   CAGE screening score of 0 on 8/14/2024, showing low risk of alcohol abuse.      Patient Care Team:  Ashely Morel MD as PCP - General (Family Practice)  Orlando Allen MD (UROLOGY)  Charli Mosley (Internal Medicine)  Ashely Morel MD (Family Medicine)    Review of Systems  GENERAL: feels well otherwise  SKIN: denies any unusual skin lesions  EYES: denies blurred vision or double vision  HEENT: denies nasal congestion, sinus pain or ST  LUNGS: denies shortness of breath with exertion  CARDIOVASCULAR: denies chest pain on exertion  GI: denies abdominal pain, denies heartburn  : denies dysuria, vaginal discharge or itching, no complaint of urinary incontinence   MUSCULOSKELETAL: denies back pain  NEURO: denies headaches  PSYCHE: denies depression or anxiety  HEMATOLOGIC: denies hx of anemia  ENDOCRINE: denies thyroid history  ALL/ASTHMA: denies hx of allergy or asthma    Objective:   Physical Exam  General Appearance:  Alert, cooperative, no distress, appears stated age   Head:  Normocephalic, without obvious abnormality, atraumatic   Eyes:  PERRL, conjunctiva/corneas clear, EOM's intact both eyes   Ears:  Normal TM's and external ear canals, both ears   Nose: Nares normal, septum midline,mucosa normal, no drainage or sinus tenderness   Throat: Lips, mucosa, and tongue normal; teeth and gums normal   Neck: Supple, symmetrical, trachea midline, no adenopathy;  thyroid: not enlarged, symmetric, no tenderness/mass/nodules; no carotid bruit or JVD   Back:   Symmetric, no curvature, ROM normal, no CVA tenderness   Lungs:   Clear to auscultation  bilaterally, respirations unlabored   Heart:  Regular rate and rhythm, S1 and S2 normal, no murmur, rub, or gallop   Abdomen:   Soft, non-tender, bowel sounds active all four quadrants,  no masses, no organomegaly   Pelvic: Deferred   Extremities: Extremities normal, atraumatic, no cyanosis or edema   Pulses: 2+ and symmetric   Skin: Skin color, texture, turgor normal, no rashes or lesions   Lymph nodes: Cervical, supraclavicular, and axillary nodes normal   Neurologic: Normal       /80   Pulse 70   Temp 98.3 °F (36.8 °C) (Temporal)   Resp 16   Ht 5' 7.5\" (1.715 m)   Wt 213 lb (96.6 kg)   SpO2 96%   BMI 32.87 kg/m²  Estimated body mass index is 32.87 kg/m² as calculated from the following:    Height as of this encounter: 5' 7.5\" (1.715 m).    Weight as of this encounter: 213 lb (96.6 kg).    Medicare Hearing Assessment:   Hearing Screening    Time taken: 8/21/2024  9:03 AM  Entry User: Ashely Morel MD  Screening Method: Whisper Test  Whisper Test Result: Pass         Visual Acuity:   Right Eye Visual Acuity: Corrected Right Eye Chart Acuity: 20/30   Left Eye Visual Acuity: Corrected Left Eye Chart Acuity: 20/40   Both Eyes Visual Acuity: Corrected Both Eyes Chart Acuity: 20/30   Able To Tolerate Visual Acuity: Yes        Assessment & Plan:   Armida Suarez is a 69 year old female who presents for a Medicare Assessment.     1. Restless legs syndrome (RLS) (Primary)  2. Pure hypercholesterolemia  3. Primary hypertension  4. Presence of drug-eluting stent in left circumflex coronary artery  5. Pelvic floor dysfunction  Overview:  Formatting of this note might be different from the original.  Added automatically from request for surgery [id number]    Formatting of this note might be different from the original.  Added automatically from request for surgery [id number]    6. BHANU (obstructive sleep apnea)  7. Visit for screening mammogram  -     3D Mammogram Digital Screen, Bilateral (CPT=77067/89094); Future;  Expected date: 08/21/2024  8. Encounter for annual health examination  1. Restless legs syndrome (RLS)  Well controlled, CCM    2. Pure hypercholesterolemia  Well controlled, CCM    3. Primary hypertension  Well controlled, CCM    4. Presence of drug-eluting stent in left circumflex coronary artery  Seeing cardiology this month    5. Pelvic floor dysfunction  Exercise and cystoscopy    6. BHANU (obstructive sleep apnea)  Compliant with meds    7. Visit for screening mammogram  ordered  - 3D Mammogram Digital Screen, Bilateral (CPT=77067/83128); Future    8. Encounter for annual health examination  done          The patient indicates understanding of these issues and agrees to the plan.  Reinforced healthy diet, lifestyle, and exercise.      No follow-ups on file.     Ashely Morel MD, 8/21/2024     Supplementary Documentation:   General Health:  In the past six months, have you lost more than 10 pounds without trying?: 2 - No  Has your appetite been poor?: No  Type of Diet: Balanced;Low Salt  How does the patient maintain a good energy level?: Appropriate Exercise  How would you describe your daily physical activity?: Moderate  How would you describe your current health state?: Good  How do you maintain positive mental well-being?: Social Interaction;Games;Visiting Friends;Visiting Family  On a scale of 0 to 10, with 0 being no pain and 10 being severe pain, what is your pain level?: 1 - (Mild)  In the past six months, have you experienced urine leakage?: 1-Yes  At any time do you feel concerned for the safety/well-being of yourself and/or your children, in your home or elsewhere?: No  Have you had any immunizations at another office such as Influenza, Hepatitis B, Tetanus, or Pneumococcal?: No    Health Maintenance   Topic Date Due    Zoster Vaccines (2 of 3) 02/24/2014    COVID-19 Vaccine (3 - 2023-24 season) 09/01/2023    Annual Physical  08/26/2024    Mammogram  08/28/2024    Influenza Vaccine (1) 10/01/2024     Colorectal Cancer Screening  08/03/2025    DEXA Scan  Completed    Annual Depression Screening  Completed    Fall Risk Screening (Annual)  Completed    Pneumococcal Vaccine: 65+ Years  Completed

## 2024-09-12 ENCOUNTER — HOSPITAL ENCOUNTER (OUTPATIENT)
Dept: MAMMOGRAPHY | Age: 69
Discharge: HOME | End: 2024-09-12
Attending: INTERNAL MEDICINE
Payer: MEDICARE

## 2024-09-12 DIAGNOSIS — Z12.31 VISIT FOR SCREENING MAMMOGRAM: ICD-10-CM

## 2024-09-12 PROCEDURE — 77063 BREAST TOMOSYNTHESIS BI: CPT | Performed by: INTERNAL MEDICINE

## 2024-09-12 PROCEDURE — 77067 SCR MAMMO BI INCL CAD: CPT | Performed by: INTERNAL MEDICINE

## 2024-10-08 NOTE — PATIENT INSTRUCTIONS
Abdomen , soft, nontender, nondistended , no guarding or rigidity , no masses palpable , normal bowel sounds , Liver and Spleen , no hepatomegaly present , no hepatosplenomegaly , liver nontender , spleen not palpable Get labs tomorrow with other labs. If normal, consider medications for the limb movement disorder. Start CPAP on auto titration. Get machine from [employer] (Inspect Manager) on [address] [facility]  ([phone]).      Recheck about 2 weeks after starting to use the [facility]

## 2024-11-14 ENCOUNTER — OFFICE VISIT (OUTPATIENT)
Dept: FAMILY MEDICINE CLINIC | Facility: CLINIC | Age: 69
End: 2024-11-14
Payer: MEDICARE

## 2024-11-14 ENCOUNTER — HOSPITAL ENCOUNTER (OUTPATIENT)
Dept: GENERAL RADIOLOGY | Age: 69
Discharge: HOME | End: 2024-11-14
Payer: MEDICARE

## 2024-11-14 ENCOUNTER — ANCILLARY ORDERS (OUTPATIENT)
Dept: FAMILY MEDICINE CLINIC | Facility: CLINIC | Age: 69
End: 2024-11-14

## 2024-11-14 VITALS
DIASTOLIC BLOOD PRESSURE: 62 MMHG | OXYGEN SATURATION: 96 % | WEIGHT: 210.5 LBS | TEMPERATURE: 98 F | HEART RATE: 65 BPM | HEIGHT: 67.5 IN | SYSTOLIC BLOOD PRESSURE: 126 MMHG | BODY MASS INDEX: 32.65 KG/M2 | RESPIRATION RATE: 12 BRPM

## 2024-11-14 DIAGNOSIS — Z78.0 POST-MENOPAUSAL: ICD-10-CM

## 2024-11-14 DIAGNOSIS — M54.50 RIGHT LOW BACK PAIN, UNSPECIFIED CHRONICITY, UNSPECIFIED WHETHER SCIATICA PRESENT: Primary | ICD-10-CM

## 2024-11-14 DIAGNOSIS — M62.830 LUMBAR PARASPINAL MUSCLE SPASM: ICD-10-CM

## 2024-11-14 DIAGNOSIS — M54.50 RIGHT LOW BACK PAIN, UNSPECIFIED CHRONICITY, UNSPECIFIED WHETHER SCIATICA PRESENT: ICD-10-CM

## 2024-11-14 PROCEDURE — 99213 OFFICE O/P EST LOW 20 MIN: CPT

## 2024-11-14 PROCEDURE — 72220 X-RAY EXAM SACRUM TAILBONE: CPT

## 2024-11-14 PROCEDURE — 72100 X-RAY EXAM L-S SPINE 2/3 VWS: CPT

## 2024-11-14 PROCEDURE — 72072 X-RAY EXAM THORAC SPINE 3VWS: CPT

## 2024-11-14 RX ORDER — PREDNISONE 20 MG/1
40 TABLET ORAL DAILY
Qty: 10 TABLET | Refills: 0 | Status: SHIPPED | OUTPATIENT
Start: 2024-11-14 | End: 2024-11-19

## 2024-11-14 RX ORDER — CYCLOBENZAPRINE HCL 5 MG
5 TABLET ORAL 2 TIMES DAILY PRN
Qty: 28 TABLET | Refills: 0 | Status: SHIPPED | OUTPATIENT
Start: 2024-11-14 | End: 2024-11-28

## 2024-11-14 NOTE — PROGRESS NOTES
HPI:   Armida is a 69 yr. Old female here today for right sided back pain.  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  Pain is not constant, not every day.  Work makes her symptoms worse, cannot get through consecutive massages without pain and needing to stretch.  Denies pain that radiates down the leg.  Does reports right sided paraspinal muscle tightness. Has tried taking Tylenol and moist heat application and reports improvement with these.    Denies recent injury         Current Outpatient Medications   Medication Sig Dispense Refill    predniSONE 20 MG Oral Tab Take 2 tablets (40 mg total) by mouth daily for 5 days. 10 tablet 0    cyclobenzaprine 5 MG Oral Tab Take 1 tablet (5 mg total) by mouth 2 (two) times daily as needed for Muscle spasms. 28 tablet 0    Cholecalciferol (VITAMIN D-3) 25 MCG (1000 UT) Oral Cap       aspirin 81 MG Oral Tab EC Take 1 tablet (81 mg total) by mouth daily.      atorvastatin 80 MG Oral Tab Take 1 tablet (80 mg total) by mouth nightly.      carvedilol 3.125 MG Oral Tab Take 1 tablet (3.125 mg total) by mouth 2 (two) times daily with meals.      clopidogrel 75 MG Oral Tab Take 1 tablet (75 mg total) by mouth daily.      losartan 25 MG Oral Tab Take 1 tablet (25 mg total) by mouth daily. 90 tablet 3    Coenzyme Q10 (COQ10) 100 MG Oral Cap       estradiol 0.1 MG/GM Vaginal Cream APPLY A PEA SIZED AMOUNT OF CREAM PER VAGINA TWICE WEEKLY      Milk Thistle 1000 MG Oral Cap       Ascorbic Acid (VITAMIN C OR) Take 1,000 mg by mouth 2 (two) times daily.      omega-3 fatty acids 1000 MG Oral Cap Take 1,000 mg by mouth daily.      Lactobacillus (PROBIOTIC ACIDOPHILUS OR) Take 2 tablets by mouth 2 (two) times daily.      methenamine 1 g Oral Tab Take 1 tablet (1 g total) by mouth 2 (two) times daily. (Patient not taking: Reported on 11/14/2024)        Past Medical History:     Abdominal pain, unspecified site    Acute sinusitis, unspecified    Allergic rhinitis, cause unspecified    Bronchitis, not specified as acute or chronic    Disequilibrium    Dizziness and giddiness    04/09/2012    Fuchs' corneal dystrophy    Influenza with other respiratory manifestations    Other psoriasis    Other urinary incontinence    Pure hypercholesterolemia    Sprain of neck      Past Surgical History:   Procedure Laterality Date    Hysterectomy      Complete (no cancer)     Oophorectomy        Family History   Problem Relation Age of Onset    Other (Other) Mother         MVA    Breast Cancer Maternal Aunt 60    Breast Cancer Maternal Aunt 50    Pulmonary Disease Other         Grandmother with PUD     Cancer Other         Lung cancer    Breast Cancer Cousin 50      Social History     Socioeconomic History    Marital status: OTHER   Tobacco Use    Smoking status: Never    Smokeless tobacco: Never    Tobacco comments:     Current Non smoker    Vaping Use    Vaping status: Never Used   Substance and Sexual Activity    Alcohol use: No     Alcohol/week: 0.0 standard drinks of alcohol    Drug use: No    Sexual activity: Not Currently   Other Topics Concern    Exercise Yes     Comment: 4 x week      Social Drivers of Health     Food Insecurity: Low Risk  (2/18/2024)    Received from [facility]    Food Insecurity     Have there been times that your food ran out, and you didn't have money to get more?: No     Are there times that you worry that this might happen?: No   Transportation Needs: Low Risk  (2/18/2024)    Received from [facility]    Transportation Needs     Do you have trouble getting transportation to medical appointments?: No   Housing Stability: Low Risk  (2/18/2024)    Received from [facility]    Housing Stability     Are you concerned  about having a safe and reliable place to live?: No         REVIEW OF SYSTEMS:   Review of Systems   Constitutional:  Negative for chills, fever and unexpected weight change.   Cardiovascular:  Negative for leg swelling.   Gastrointestinal:  Negative for abdominal pain, diarrhea, nausea and vomiting.   Musculoskeletal:  Positive for back pain (see hpi). Negative for gait problem and neck stiffness.   Skin:  Negative for rash.   Neurological:  Negative for weakness, light-headedness and numbness.       EXAM:   /62 (BP Location: Right arm, Patient Position: Sitting, Cuff Size: large)   Pulse 65   Temp 97.7 °F (36.5 °C) (Temporal)   Resp 12   Ht 5' 7.5\" (1.715 m)   Wt 210 lb 8 oz (95.5 kg)   SpO2 96%   BMI 32.48 kg/m²   Physical Exam  Vitals and nursing note reviewed.   Constitutional:       General: She is not in acute distress.     Appearance: Normal appearance.   HENT:      Head: Atraumatic.      Mouth/Throat:      Mouth: Mucous membranes are moist.   Cardiovascular:      Rate and Rhythm: Normal rate.   Pulmonary:      Effort: Pulmonary effort is normal.   Musculoskeletal:         General: Normal range of motion.      Cervical back: Neck supple.      Thoracic back: Spasms present. No tenderness.      Lumbar back: Spasms present. No swelling, edema, signs of trauma or bony tenderness. Negative right straight leg raise test and negative left straight leg raise test.   Skin:     General: Skin is warm and dry.   Neurological:      General: No focal deficit present.      Mental Status: She is alert and oriented to person, place, and time.      Motor: No weakness.         ASSESSMENT AND PLAN:   Diagnoses and all orders for this visit:    Right low back pain, unspecified chronicity, unspecified whether sciatica present  -     XR LUMBAR SPINE (MIN 2 VIEWS) (CPT=72100); Future  -     XR SACRUM + COCCYX (MIN 2 VIEWS) (CPT=72220); Future  -     XR THORACIC SPINE (1 VIEW)  (CPT=72020); Future  -     predniSONE 20  MG Oral Tab; Take 2 tablets (40 mg total) by mouth daily for 5 days.  -     XR THORACIC SPINE (3 VIEWS) (CPT=72072); Future    Lumbar paraspinal muscle spasm  -     cyclobenzaprine 5 MG Oral Tab; Take 1 tablet (5 mg total) by mouth 2 (two) times daily as needed for Muscle spasms.     -Discussed xray today.  Medications sent to pharmacy.  Recommend alternate heat and ice.  Attempt to modify work schedule to to give your back more time to recover between patients.  Medication safety discussed.  -Return in 1-2 weeks for persistent or worsening symptoms, sooner as needed, call with questions.  -Patient verbalized understanding and in agreement with plan.    Jaclyn Owens, DUNCAN

## 2024-12-02 ENCOUNTER — TELEPHONE (OUTPATIENT)
Dept: FAMILY MEDICINE CLINIC | Facility: CLINIC | Age: 69
End: 2024-12-02

## 2024-12-02 ENCOUNTER — OFFICE VISIT (OUTPATIENT)
Dept: FAMILY MEDICINE CLINIC | Facility: CLINIC | Age: 69
End: 2024-12-02
Payer: MEDICARE

## 2024-12-02 ENCOUNTER — LABORATORY ENCOUNTER (OUTPATIENT)
Dept: LAB | Age: 69
End: 2024-12-02
Attending: INTERNAL MEDICINE
Payer: MEDICARE

## 2024-12-02 VITALS
RESPIRATION RATE: 16 BRPM | HEART RATE: 64 BPM | BODY MASS INDEX: 33 KG/M2 | WEIGHT: 213.13 LBS | SYSTOLIC BLOOD PRESSURE: 130 MMHG | TEMPERATURE: 98 F | DIASTOLIC BLOOD PRESSURE: 74 MMHG | OXYGEN SATURATION: 97 %

## 2024-12-02 DIAGNOSIS — I10 PRIMARY HYPERTENSION: ICD-10-CM

## 2024-12-02 DIAGNOSIS — R73.01 IMPAIRED FASTING GLUCOSE: ICD-10-CM

## 2024-12-02 DIAGNOSIS — R30.0 DYSURIA: Primary | ICD-10-CM

## 2024-12-02 DIAGNOSIS — M54.50 ACUTE RIGHT-SIDED LOW BACK PAIN WITHOUT SCIATICA: Primary | ICD-10-CM

## 2024-12-02 DIAGNOSIS — E74.39 GLUCOSE INTOLERANCE: ICD-10-CM

## 2024-12-02 DIAGNOSIS — R26.89 IMBALANCE: ICD-10-CM

## 2024-12-02 LAB
ALBUMIN SERPL-MCNC: 4.1 G/DL (ref 3.2–4.8)
ALBUMIN/GLOB SERPL: 1.3 {RATIO} (ref 1–2)
ALP LIVER SERPL-CCNC: 71 U/L
ALT SERPL-CCNC: 22 U/L
ANION GAP SERPL CALC-SCNC: 8 MMOL/L (ref 0–18)
AST SERPL-CCNC: 22 U/L (ref ?–34)
BASOPHILS # BLD AUTO: 0.04 X10(3) UL (ref 0–0.2)
BASOPHILS NFR BLD AUTO: 0.5 %
BILIRUB SERPL-MCNC: 0.7 MG/DL (ref 0.2–1.1)
BILIRUBIN: NEGATIVE
BUN BLD-MCNC: 19 MG/DL (ref 9–23)
CALCIUM BLD-MCNC: 9.2 MG/DL (ref 8.7–10.4)
CHLORIDE SERPL-SCNC: 108 MMOL/L (ref 98–112)
CHOLEST SERPL-MCNC: 157 MG/DL (ref ?–200)
CO2 SERPL-SCNC: 25 MMOL/L (ref 21–32)
CREAT BLD-MCNC: 0.75 MG/DL
EGFRCR SERPLBLD CKD-EPI 2021: 86 ML/MIN/1.73M2 (ref 60–?)
EOSINOPHIL # BLD AUTO: 0.23 X10(3) UL (ref 0–0.7)
EOSINOPHIL NFR BLD AUTO: 3.1 %
ERYTHROCYTE [DISTWIDTH] IN BLOOD BY AUTOMATED COUNT: 13.2 %
EST. AVERAGE GLUCOSE BLD GHB EST-MCNC: 128 MG/DL (ref 68–126)
FASTING PATIENT LIPID ANSWER: NO
FASTING STATUS PATIENT QL REPORTED: NO
GLOBULIN PLAS-MCNC: 3.1 G/DL (ref 2–3.5)
GLUCOSE (URINE DIPSTICK): NEGATIVE MG/DL
GLUCOSE BLD-MCNC: 92 MG/DL (ref 70–99)
HBA1C MFR BLD: 6.1 % (ref ?–5.7)
HCT VFR BLD AUTO: 40.7 %
HDLC SERPL-MCNC: 53 MG/DL (ref 40–59)
HGB BLD-MCNC: 13.8 G/DL
IMM GRANULOCYTES # BLD AUTO: 0.03 X10(3) UL (ref 0–1)
IMM GRANULOCYTES NFR BLD: 0.4 %
KETONES (URINE DIPSTICK): NEGATIVE MG/DL
LDLC SERPL CALC-MCNC: 81 MG/DL (ref ?–100)
LYMPHOCYTES # BLD AUTO: 1.37 X10(3) UL (ref 1–4)
LYMPHOCYTES NFR BLD AUTO: 18.7 %
MCH RBC QN AUTO: 31.1 PG (ref 26–34)
MCHC RBC AUTO-ENTMCNC: 33.9 G/DL (ref 31–37)
MCV RBC AUTO: 91.7 FL
MONOCYTES # BLD AUTO: 0.91 X10(3) UL (ref 0.1–1)
MONOCYTES NFR BLD AUTO: 12.4 %
MULTISTIX LOT#: ABNORMAL NUMERIC
NEUTROPHILS # BLD AUTO: 4.76 X10 (3) UL (ref 1.5–7.7)
NEUTROPHILS # BLD AUTO: 4.76 X10(3) UL (ref 1.5–7.7)
NEUTROPHILS NFR BLD AUTO: 64.9 %
NITRITE, URINE: NEGATIVE
NONHDLC SERPL-MCNC: 104 MG/DL (ref ?–130)
OCCULT BLOOD: NEGATIVE
OSMOLALITY SERPL CALC.SUM OF ELEC: 294 MOSM/KG (ref 275–295)
PH, URINE: 7.5 (ref 4.5–8)
PLATELET # BLD AUTO: 245 10(3)UL (ref 150–450)
POTASSIUM SERPL-SCNC: 3.9 MMOL/L (ref 3.5–5.1)
PROT SERPL-MCNC: 7.2 G/DL (ref 5.7–8.2)
PROTEIN (URINE DIPSTICK): NEGATIVE MG/DL
RBC # BLD AUTO: 4.44 X10(6)UL
SODIUM SERPL-SCNC: 141 MMOL/L (ref 136–145)
SPECIFIC GRAVITY: 1.02 (ref 1–1.03)
TRIGL SERPL-MCNC: 128 MG/DL (ref 30–149)
URINE-COLOR: YELLOW
UROBILINOGEN,SEMI-QN: 0.2 MG/DL (ref 0–1.9)
VLDLC SERPL CALC-MCNC: 20 MG/DL (ref 0–30)
WBC # BLD AUTO: 7.3 X10(3) UL (ref 4–11)

## 2024-12-02 PROCEDURE — 85025 COMPLETE CBC W/AUTO DIFF WBC: CPT

## 2024-12-02 PROCEDURE — 87186 SC STD MICRODIL/AGAR DIL: CPT | Performed by: INTERNAL MEDICINE

## 2024-12-02 PROCEDURE — 87086 URINE CULTURE/COLONY COUNT: CPT | Performed by: INTERNAL MEDICINE

## 2024-12-02 PROCEDURE — 80053 COMPREHEN METABOLIC PANEL: CPT

## 2024-12-02 PROCEDURE — 80061 LIPID PANEL: CPT

## 2024-12-02 PROCEDURE — 83036 HEMOGLOBIN GLYCOSYLATED A1C: CPT

## 2024-12-02 PROCEDURE — 87088 URINE BACTERIA CULTURE: CPT | Performed by: INTERNAL MEDICINE

## 2024-12-02 PROCEDURE — 36415 COLL VENOUS BLD VENIPUNCTURE: CPT

## 2024-12-02 RX ORDER — ALBUTEROL SULFATE 90 UG/1
2 INHALANT RESPIRATORY (INHALATION) EVERY 4 HOURS PRN
COMMUNITY
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
## 2024-12-03 DIAGNOSIS — R73.01 IMPAIRED FASTING GLUCOSE: ICD-10-CM

## 2024-12-03 DIAGNOSIS — I10 PRIMARY HYPERTENSION: Primary | ICD-10-CM

## 2024-12-03 DIAGNOSIS — E74.39 GLUCOSE INTOLERANCE: ICD-10-CM

## 2024-12-03 DIAGNOSIS — E78.00 HYPERCHOLESTEREMIA: ICD-10-CM

## 2024-12-03 DIAGNOSIS — E78.00 PURE HYPERCHOLESTEROLEMIA: ICD-10-CM

## 2024-12-04 RX ORDER — SULFAMETHOXAZOLE AND TRIMETHOPRIM 800; 160 MG/1; MG/1
1 TABLET ORAL 2 TIMES DAILY
Qty: 10 TABLET | Refills: 0 | Status: SHIPPED | OUTPATIENT
Start: 2024-12-04 | End: 2024-12-09

## 2024-12-06 ENCOUNTER — HOSPITAL ENCOUNTER (OUTPATIENT)
Dept: BONE DENSITY | Age: 69
Discharge: HOME | End: 2024-12-06
Payer: MEDICARE

## 2024-12-06 DIAGNOSIS — Z78.0 POST-MENOPAUSAL: ICD-10-CM

## 2024-12-06 PROCEDURE — 77080 DXA BONE DENSITY AXIAL: CPT

## 2024-12-28 ENCOUNTER — TELEPHONE (OUTPATIENT)
Dept: FAMILY MEDICINE CLINIC | Facility: CLINIC | Age: 69
End: 2024-12-28

## 2024-12-30 NOTE — TELEPHONE ENCOUNTER
Patient advised. She said that Dr Kilgore called her and advised her to stop the Vitamin d and recheck level in 6 months.

## 2025-01-06 ENCOUNTER — OFFICE VISIT (OUTPATIENT)
Dept: FAMILY MEDICINE CLINIC | Facility: CLINIC | Age: 70
End: 2025-01-06
Payer: MEDICARE

## 2025-01-06 VITALS
HEART RATE: 67 BPM | BODY MASS INDEX: 34 KG/M2 | SYSTOLIC BLOOD PRESSURE: 122 MMHG | WEIGHT: 218 LBS | TEMPERATURE: 98 F | DIASTOLIC BLOOD PRESSURE: 70 MMHG | RESPIRATION RATE: 16 BRPM | OXYGEN SATURATION: 96 %

## 2025-01-06 DIAGNOSIS — R26.89 IMBALANCE: ICD-10-CM

## 2025-01-06 DIAGNOSIS — M62.830 LUMBAR PARASPINAL MUSCLE SPASM: ICD-10-CM

## 2025-01-06 DIAGNOSIS — M25.551 RIGHT HIP PAIN: Primary | ICD-10-CM

## 2025-01-06 PROCEDURE — 99214 OFFICE O/P EST MOD 30 MIN: CPT | Performed by: INTERNAL MEDICINE

## 2025-01-06 PROCEDURE — G2211 COMPLEX E/M VISIT ADD ON: HCPCS | Performed by: INTERNAL MEDICINE

## 2025-01-06 RX ORDER — CYCLOBENZAPRINE HCL 5 MG
5 TABLET ORAL 2 TIMES DAILY PRN
COMMUNITY
Start: 2024-12-24

## 2025-01-06 NOTE — PROGRESS NOTES
Armida Suarez is a 69 year old female.  HPI:   Pt fell at her sons house yesterday.  She was sitting at a high boy and folding forward to put her shoes on and the chair slid out from under her and she fell onto a chandrika floor striking her right hip and right arm.  She has soreness, no bruising and has iced, heated and taken tylenol.  No head injury.   Current Outpatient Medications   Medication Sig Dispense Refill    cyclobenzaprine 5 MG Oral Tab Take 1 tablet (5 mg total) by mouth 2 (two) times daily as needed for Muscle spasms.      albuterol 108 (90 Base) MCG/ACT Inhalation Aero Soln Inhale 2 puffs into the lungs every 4 (four) hours as needed.      Cholecalciferol (VITAMIN D-3) 25 MCG (1000 UT) Oral Cap       aspirin 81 MG Oral Tab EC Take 1 tablet (81 mg total) by mouth daily.      atorvastatin 80 MG Oral Tab Take 1 tablet (80 mg total) by mouth nightly.      carvedilol 3.125 MG Oral Tab Take 1 tablet (3.125 mg total) by mouth 2 (two) times daily with meals.      clopidogrel 75 MG Oral Tab Take 1 tablet (75 mg total) by mouth daily.      losartan 25 MG Oral Tab Take 1 tablet (25 mg total) by mouth daily. 90 tablet 3    Coenzyme Q10 (COQ10) 100 MG Oral Cap       estradiol 0.1 MG/GM Vaginal Cream APPLY A PEA SIZED AMOUNT OF CREAM PER VAGINA TWICE WEEKLY      Milk Thistle 1000 MG Oral Cap       Ascorbic Acid (VITAMIN C OR) Take 1,000 mg by mouth 2 (two) times daily.      omega-3 fatty acids 1000 MG Oral Cap Take 1,000 mg by mouth daily.      Lactobacillus (PROBIOTIC ACIDOPHILUS OR) Take 2 tablets by mouth 2 (two) times daily.        Past Medical History:    Abdominal pain, unspecified site    Acute sinusitis, unspecified    Allergic rhinitis, cause unspecified    Bronchitis, not specified as acute or chronic    Disequilibrium    Dizziness and giddiness    04/09/2012    Fuchs' corneal dystrophy    Influenza with other respiratory manifestations    Other psoriasis    Other urinary incontinence    Pure  hypercholesterolemia    Sprain of neck      Social History:  Social History     Socioeconomic History    Marital status: OTHER   Tobacco Use    Smoking status: Never    Smokeless tobacco: Never    Tobacco comments:     Current Non smoker    Vaping Use    Vaping status: Never Used   Substance and Sexual Activity    Alcohol use: No     Alcohol/week: 0.0 standard drinks of alcohol    Drug use: No    Sexual activity: Not Currently   Other Topics Concern    Exercise Yes     Comment: 4 x week      Social Drivers of Health     Food Insecurity: Low Risk  (2/18/2024)    Received from [facility]    Food Insecurity     Have there been times that your food ran out, and you didn't have money to get more?: No     Are there times that you worry that this might happen?: No   Transportation Needs: Low Risk  (2/18/2024)    Received from [facility]    Transportation Needs     Do you have trouble getting transportation to medical appointments?: No   Housing Stability: Low Risk  (2/18/2024)    Received from [facility]    Housing Stability     Are you concerned about having a safe and reliable place to live?: No        REVIEW OF SYSTEMS:   GENERAL HEALTH: feels well otherwise  SKIN: denies any unusual skin lesions or rashes  RESPIRATORY: denies shortness of breath with exertion  CARDIOVASCULAR: denies chest pain on exertion  GI: denies abdominal pain and denies heartburn  NEURO: denies headaches    EXAM:   /70   Pulse 67   Temp 98 °F (36.7 °C) (Temporal)   Resp 16   Wt 218 lb (98.9 kg)   SpO2 96%   BMI 33.64 kg/m²   GENERAL: well developed, well nourished,in no apparent distress  SKIN: no rashes,no suspicious lesions  HEENT: atraumatic, normocephalic,ears and throat are clear  NECK: supple,no adenopathy,no bruits  LUNGS: clear to auscultation  CARDIO: RRR  without murmur  GI: good BS's,no masses, HSM or tenderness  EXTREMITIES: no cyanosis, clubbing or edema    ASSESSMENT AND PLAN:     Encounter Diagnoses   Name Primary?    Right hip pain Yes    Imbalance     Lumbar paraspinal muscle spasm    Rest, ice, anti-inflammatories and gentle rotation.  If no improvement in 2 weeks return.           No orders of the defined types were placed in this encounter.      Meds & Refills for this Visit:  Requested Prescriptions      No prescriptions requested or ordered in this encounter       Imaging & Consults:  None    Follow up as needed.     The patient indicates understanding of these issues and agrees to the plan.

## 2025-01-10 ENCOUNTER — OFFICE VISIT (OUTPATIENT)
Dept: PHYSICAL THERAPY | Age: 70
End: 2025-01-10
Attending: INTERNAL MEDICINE
Payer: MEDICARE

## 2025-01-10 DIAGNOSIS — M54.50 ACUTE RIGHT-SIDED LOW BACK PAIN WITHOUT SCIATICA: Primary | ICD-10-CM

## 2025-01-10 DIAGNOSIS — R26.89 IMBALANCE: ICD-10-CM

## 2025-01-10 PROCEDURE — 97161 PT EVAL LOW COMPLEX 20 MIN: CPT

## 2025-01-10 PROCEDURE — 97140 MANUAL THERAPY 1/> REGIONS: CPT
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
## 2025-01-14 ENCOUNTER — OFFICE VISIT (OUTPATIENT)
Dept: PHYSICAL THERAPY | Age: 70
End: 2025-01-14
Attending: INTERNAL MEDICINE
Payer: MEDICARE

## 2025-01-14 PROCEDURE — 97112 NEUROMUSCULAR REEDUCATION: CPT

## 2025-01-14 PROCEDURE — 97110 THERAPEUTIC EXERCISES: CPT

## 2025-01-14 PROCEDURE — 97140 MANUAL THERAPY 1/> REGIONS: CPT

## 2025-01-14 NOTE — PROGRESS NOTES
Patient: Armida Suarez (69 year old, female) Referring Provider:  Insurance:   Diagnosis: Acute right-sided low back pain without sciatica (M54.50)  Imbalance (R26.89 Ashely Morel  MEDICARE   Date of Surgery: No data recorded Next MD visit:  COMMERCIAL   Precautions:  Fall Risk No data recorded Referral Information:    Date of Evaluation: Req: 0, Auth: 0, Exp:     01/10/25 POC Auth Visits:  No data recorded       Today's Date   1/14/2025    Subjective  Pt reports she felt better after evaluation, but that night had severe maylin horse in medial thigh. She states the day after, she was able to tolerate being on her feet more. Sunday, she was able to work without any pain. Then she used to the rest room and \"it\" went out \"big time\". She had to struggle to get up and get to another room, then try her piriformis stretches, took about 30 mins to let go and then Monday, woke up and felt ok. She reports she feels she had better bladder control with stimulator after last visit. She states she was able to negotiate 2 flights of stairs reciproically and was able to keep pace with the others in her group when walking.       Pain: 5/10     Objective  Continued R on L inominate     Assessment  less pain end of visit. Cues for DPM and TA bracing as pt tends to PPT with TA.    Goals (to be met in 8 visits)   Goals       Therapy Goals      Not Met Progress Toward Partially Met Met   Pt will improve transversus abdominis recruitment to perform proper isometric contraction without requiring verbal or tactile cuing to promote advancement of therex. [] [] [] []   Pt will demonstrate good understanding of proper posture and body mechanics to decrease pain and improve spinal safety. [] [] [] []   Pt will improve lumbar spine AROM lateral flexion to <60 cm Finger tip to floor to allow increase ease with bending forward to don shoes. [] [] [] []   Pt will report improved symptom centralization and absence of radicular symptoms for 3  consecutive days to improve function with ADLs. [] [] [] []   Pt will have decreased paraspinal mm tension to tolerate standing >90 minutes for work and home activities. [] [] [] []   Pt will demonstrate improved core strength to be able to perform modified dead bug with <3/10 pain. [] [] [] []   Pt will be independent and compliant with comprehensive HEP to maintain progress achieved in PT [] [] [] []                   Plan  Address SI dysfunction, core stabilization, glut strength for return to PLOF.    Treatment Last 4 Visits       1/10/2025 1/14/2025   Treatment   Treatment Day  2   Therapeutic Exercise Hip ADD ball squeeze 10\" 10x  MR hip ABD 10\" 10x  Seated hip ADD foam roll 10\" 3x  MR hip ABD 10\" 3x Hip ADD foam roll 10\" 10x  Hip ABD 10\" 10x isometric belt  Manual piriformis stretch 30 sec 3x RLE  Manual QL stretch 30 sec 3x SL RLE   Neuro Re-Ed  Les on chair, TA activation 10\" hold 10x   Manual Therapy SI MET for R anterior hamstring 10\" 10x  SL L down 3x 8\" hold 2/2 weakness   SI MET for R anterior hamstring 10\" 5x  SL L down 4x 10\" hold      Therapeutic Exercise Min 4 26   Neuro Re-Ed Min  8   Manual Therapy Min 8 8   Evaluation Min 33    Total of Timed Procedures 12 42   Total of Service Based 33 0   Total Treatment Time 45 42         HEP  Access Code: [email or web address]  URL: [email or web address]  Date: 01/14/2025  Prepared by: Julianne Miller    Exercises  - Supine 90/90 Abdominal Bracing  - 1 x daily - 7 x weekly - 3 sets - 10 reps - 5 sec hold    Charges  Low Complexity  1 manual 1 neuro re ed 1 ther ex

## 2025-01-17 ENCOUNTER — OFFICE VISIT (OUTPATIENT)
Dept: PHYSICAL THERAPY | Age: 70
End: 2025-01-17
Attending: INTERNAL MEDICINE
Payer: MEDICARE

## 2025-01-17 PROCEDURE — 97110 THERAPEUTIC EXERCISES: CPT

## 2025-01-17 PROCEDURE — 97112 NEUROMUSCULAR REEDUCATION: CPT

## 2025-01-17 PROCEDURE — 97140 MANUAL THERAPY 1/> REGIONS: CPT

## 2025-01-17 NOTE — PROGRESS NOTES
Patient: Armida Suarez (69 year old, female) Referring Provider:  Insurance:   Diagnosis: Acute right-sided low back pain without sciatica (M54.50)  Imbalance (R26.89 Ashely Morel  MEDICARE   Date of Surgery: No data recorded Next MD visit:  COMMERCIAL   Precautions:  Fall Risk No data recorded Referral Information:    Date of Evaluation: Req: 0, Auth: 0, Exp:     01/10/25 POC Auth Visits:          Today's Date   1/17/2025    Subjective  Pt states she was to do her HEP, walked on treadmill 40 mins without pain increase until she did her bridges and then \"it went out\"       Pain: 4/10     Objective  TTP and hypertonic R QL        Assessment  PT instructs pt in core bracing and holding bridges at home until core stability has been built to protect spine.    Goals (to be met in 8 visits)   Goals       Therapy Goals      Not Met Progress Toward Partially Met Met   Pt will improve transversus abdominis recruitment to perform proper isometric contraction without requiring verbal or tactile cuing to promote advancement of therex. [] [] [] []   Pt will demonstrate good understanding of proper posture and body mechanics to decrease pain and improve spinal safety. [] [] [] []   Pt will improve lumbar spine AROM lateral flexion to <60 cm Finger tip to floor to allow increase ease with bending forward to don shoes. [] [] [] []   Pt will report improved symptom centralization and absence of radicular symptoms for 3 consecutive days to improve function with ADLs. [] [] [] []   Pt will have decreased paraspinal mm tension to tolerate standing >90 minutes for work and home activities. [] [] [] []   Pt will demonstrate improved core strength to be able to perform modified dead bug with <3/10 pain. [] [] [] []   Pt will be independent and compliant with comprehensive HEP to maintain progress achieved in PT [] [] [] []                   Plan  Address SI dysfunction, core stabilization, glut strength for return to  PLOF.    Treatment Last 4 Visits       1/10/2025 1/14/2025 1/17/2025   Treatment   Treatment Day  2 3   Therapeutic Exercise Hip ADD ball squeeze 10\" 10x  MR hip ABD 10\" 10x  Seated hip ADD foam roll 10\" 3x  MR hip ABD 10\" 3x Hip ADD foam roll 10\" 10x  Hip ABD 10\" 10x isometric belt  Manual piriformis stretch 30 sec 3x RLE  Manual QL stretch 30 sec 3x SL RLE Hip ADD foam roll 10\" 10x  Hip ABD 10\" 10x isometric belt  Manual piriformis, hamstring, hip ADD, glut stretch 30 sec 3x RLE  Manual QL stretch 30 sec 3x SL RLE       Neuro Re-Ed  Les on chair, TA activation 10\" hold 10x DPM, pelvic floor lift  5\" hold on exhale 10x2   Manual Therapy SI MET for R anterior hamstring 10\" 10x  SL L down 3x 8\" hold 2/2 weakness   SI MET for R anterior hamstring 10\" 5x  SL L down 4x 10\" hold    SI MET for R anterior hamstring 10\" 8x  SL over pillow at pelvic crest STM to R QL effleurage at paraspinal    Therapeutic Exercise Min 4 26 20   Neuro Re-Ed Min  8 8   Manual Therapy Min 8 8 16   Evaluation Min 33     Total of Timed Procedures 12 42 44   Total of Service Based 33 0 0   Total Treatment Time 45 42 44         HEP       Charges           1 neuro re ed 1 manual 1 ther ex

## 2025-01-23 ENCOUNTER — OFFICE VISIT (OUTPATIENT)
Dept: FAMILY MEDICINE CLINIC | Facility: CLINIC | Age: 70
End: 2025-01-23
Payer: MEDICARE

## 2025-01-23 ENCOUNTER — TELEPHONE (OUTPATIENT)
Dept: FAMILY MEDICINE CLINIC | Facility: CLINIC | Age: 70
End: 2025-01-23

## 2025-01-23 VITALS
SYSTOLIC BLOOD PRESSURE: 138 MMHG | OXYGEN SATURATION: 96 % | HEART RATE: 75 BPM | RESPIRATION RATE: 18 BRPM | TEMPERATURE: 98 F | BODY MASS INDEX: 33 KG/M2 | DIASTOLIC BLOOD PRESSURE: 74 MMHG | WEIGHT: 215 LBS

## 2025-01-23 DIAGNOSIS — M54.16 LUMBAR RADICULAR PAIN: Primary | ICD-10-CM

## 2025-01-23 PROCEDURE — 99214 OFFICE O/P EST MOD 30 MIN: CPT | Performed by: INTERNAL MEDICINE

## 2025-01-23 RX ORDER — LIDOCAINE 50 MG/G
1 PATCH TOPICAL EVERY 24 HOURS
COMMUNITY
Start: 2025-01-22

## 2025-01-23 RX ORDER — DIAZEPAM 5 MG/1
5 TABLET ORAL
COMMUNITY
Start: 2025-01-22

## 2025-01-23 NOTE — TELEPHONE ENCOUNTER
Patient states that she spoke with HR and she wants to return to work next Wed 1/29/25.   Note placed up front for patient to .

## 2025-01-23 NOTE — PROGRESS NOTES
Armida Suarez is a 69 year old female.  HPI:   Pt has right sided SI joint pain at work WEDNESDAY 1/22/2025 and they had to call an ambulance.  She had elevated /89 in the ER.  She was treated with valuim, solumedrol, and toradol. She has valuim at home and flexeril and a lidocaine patch for pain.  She is moving a little better now, but carefully.  She is already in PT.  She took a flexeril this morning, but the valium makes her too disoriented.   Current Outpatient Medications   Medication Sig Dispense Refill    diazePAM 5 MG Oral Tab Take 1 tablet (5 mg total) by mouth.      lidocaine 5 % External Patch Place 1 patch onto the skin daily.      cyclobenzaprine 5 MG Oral Tab Take 1 tablet (5 mg total) by mouth 2 (two) times daily as needed for Muscle spasms.      albuterol 108 (90 Base) MCG/ACT Inhalation Aero Soln Inhale 2 puffs into the lungs every 4 (four) hours as needed.      Cholecalciferol (VITAMIN D-3) 25 MCG (1000 UT) Oral Cap       aspirin 81 MG Oral Tab EC Take 1 tablet (81 mg total) by mouth daily.      atorvastatin 80 MG Oral Tab Take 1 tablet (80 mg total) by mouth nightly.      carvedilol 3.125 MG Oral Tab Take 1 tablet (3.125 mg total) by mouth 2 (two) times daily with meals.      clopidogrel 75 MG Oral Tab Take 1 tablet (75 mg total) by mouth daily.      losartan 25 MG Oral Tab Take 1 tablet (25 mg total) by mouth daily. 90 tablet 3    Coenzyme Q10 (COQ10) 100 MG Oral Cap       estradiol 0.1 MG/GM Vaginal Cream APPLY A PEA SIZED AMOUNT OF CREAM PER VAGINA TWICE WEEKLY      Milk Thistle 1000 MG Oral Cap       Ascorbic Acid (VITAMIN C OR) Take 1,000 mg by mouth 2 (two) times daily.      omega-3 fatty acids 1000 MG Oral Cap Take 1,000 mg by mouth daily.      Lactobacillus (PROBIOTIC ACIDOPHILUS OR) Take 2 tablets by mouth 2 (two) times daily.        Past Medical History:    Abdominal pain, unspecified site    Acute sinusitis, unspecified    Allergic rhinitis, cause unspecified    Bronchitis,  not specified as acute or chronic    Disequilibrium    Dizziness and giddiness    04/09/2012    Fuchs' corneal dystrophy    Influenza with other respiratory manifestations    Other psoriasis    Other urinary incontinence    Pure hypercholesterolemia    Sprain of neck      Social History:  Social History     Socioeconomic History    Marital status: OTHER   Tobacco Use    Smoking status: Never    Smokeless tobacco: Never    Tobacco comments:     Current Non smoker    Vaping Use    Vaping status: Never Used   Substance and Sexual Activity    Alcohol use: No     Alcohol/week: 0.0 standard drinks of alcohol    Drug use: No    Sexual activity: Not Currently   Other Topics Concern    Exercise Yes     Comment: 4 x week      Social Drivers of Health     Food Insecurity: No Food Insecurity (1/23/2025)    NCSS - Food Insecurity     Worried About Running Out of Food in the Last Year: No     Ran Out of Food in the Last Year: No   Transportation Needs: No Transportation Needs (1/23/2025)    NCSS - Transportation     Lack of Transportation: No   Housing Stability: Not At Risk (1/23/2025)    NCSS - Housing/Utilities     Has Housing: Yes     Worried About Losing Housing: No     Unable to Get Utilities: No        REVIEW OF SYSTEMS:   GENERAL HEALTH: feels well otherwise  SKIN: denies any unusual skin lesions or rashes  RESPIRATORY: denies shortness of breath with exertion  CARDIOVASCULAR: denies chest pain on exertion  GI: denies abdominal pain and denies heartburn  NEURO: denies headaches    EXAM:   /74   Pulse 75   Temp 98 °F (36.7 °C) (Temporal)   Resp 18   Wt 215 lb (97.5 kg)   SpO2 96%   BMI 33.18 kg/m²   GENERAL: well developed, well nourished,in no apparent distress  SKIN: no rashes,no suspicious lesions  HEENT: atraumatic, normocephalic,ears and throat are clear  NECK: supple,no adenopathy,no bruits  LUNGS: clear to auscultation  CARDIO: RRR without murmur  GI: good BS's,no masses, HSM or tenderness  EXTREMITIES:  no cyanosis, clubbing or edema    ASSESSMENT AND PLAN:     Encounter Diagnosis   Name Primary?    Lumbar radicular pain Yes   Spasm improved, she needs therapy, core strengthening and weight loss. She has not had imaging and despite conservative measures continue to have episodes of spasm since NOVEMBER 2024.   MRI ordered  No orders of the defined types were placed in this encounter.      Meds & Refills for this Visit:  Requested Prescriptions      No prescriptions requested or ordered in this encounter       Imaging & Consults:  MRI SPINE LUMBAR (CPT=72148)    Follow up as needed.     The patient indicates understanding of these issues and agrees to the plan.

## 2025-01-23 NOTE — TELEPHONE ENCOUNTER
Patient was seen today.  She needs note for work stating she is clear to return to work on Sunday.  If dr wants her to stay off Sunday then she will.  Patient will  note tomorrow.

## 2025-01-24 ENCOUNTER — OFFICE VISIT (OUTPATIENT)
Dept: PHYSICAL THERAPY | Age: 70
End: 2025-01-24
Attending: INTERNAL MEDICINE
Payer: MEDICARE

## 2025-01-24 PROCEDURE — 97530 THERAPEUTIC ACTIVITIES: CPT

## 2025-01-24 PROCEDURE — 97112 NEUROMUSCULAR REEDUCATION: CPT

## 2025-01-24 PROCEDURE — 97110 THERAPEUTIC EXERCISES: CPT

## 2025-01-24 NOTE — PROGRESS NOTES
Patient: Armida Suarez (69 year old, female) Referring Provider:  Insurance:   Diagnosis: Acute right-sided low back pain without sciatica (M54.50)  Imbalance (R26.89 Ashely Morel  MEDICARE   Date of Surgery: 2/17/225 MRI Next MD visit:  COMMERCIAL   Precautions:  Fall Risk No data recorded Referral Information:    Date of Evaluation: Req: 0, Auth: 0, Exp:     01/10/25 POC Auth Visits:          Today's Date   1/24/2025    Subjective  Pt states she had an episode of back pain so severe transported EMS on 1/22/24. Saw Dr. Morel 1/23/25. She states she could not straighten her R leg when it occurred, felt her whole back was \"just locked\". She states Dr. Morel has ordered and MRI. She states when the pain occurred, it was so intense, her BP was through the roof and she was crying.She states she is unable to work as the episodes are unpredictable. SHe reports N/T in buttock to tailbone. She states. She states she will schedule MRI as soon as possible. Pain patches do help.       Pain: 6/10 (at rest)     Objective  TTP and hypertonic R QL    LLE leg length 97cm RLE 93 cm after m ob 96 cm RLE              Assessment  High tissue irritability this visit and large discrepancy in leg length addressed with MET for R on L anterior inominate. Given instability at SI, focus on isometric exercises and stabilization with ed on back brace to allow for kinesthestic reminder for MRADLs and work until MRI completed and update POC.    Goals (to be met in 8 visits)   Goals       Therapy Goals      Not Met Progress Toward Partially Met Met   Pt will improve transversus abdominis recruitment to perform proper isometric contraction without requiring verbal or tactile cuing to promote advancement of therex. [] [] [] []   Pt will demonstrate good understanding of proper posture and body mechanics to decrease pain and improve spinal safety. [] [] [] []   Pt will improve lumbar spine AROM lateral flexion to <60 cm Finger tip to floor to  allow increase ease with bending forward to don shoes. [] [] [] []   Pt will report improved symptom centralization and absence of radicular symptoms for 3 consecutive days to improve function with ADLs. [] [] [] []   Pt will have decreased paraspinal mm tension to tolerate standing >90 minutes for work and home activities. [] [] [] []   Pt will demonstrate improved core strength to be able to perform modified dead bug with <3/10 pain. [] [] [] []   Pt will be independent and compliant with comprehensive HEP to maintain progress achieved in PT [] [] [] []                   Plan  Address SI dysfunction, core stabilization, glut strength for return to PLOF.    Treatment Last 4 Visits       1/10/2025 1/14/2025 1/17/2025 1/24/2025   Treatment   Treatment Day  2 3 4   Therapeutic Exercise Hip ADD ball squeeze 10\" 10x  MR hip ABD 10\" 10x  Seated hip ADD foam roll 10\" 3x  MR hip ABD 10\" 3x Hip ADD foam roll 10\" 10x  Hip ABD 10\" 10x isometric belt  Manual piriformis stretch 30 sec 3x RLE  Manual QL stretch 30 sec 3x SL RLE Hip ADD foam roll 10\" 10x  Hip ABD 10\" 10x isometric belt  Manual piriformis, hamstring, hip ADD, glut stretch 30 sec 3x RLE  Manual QL stretch 30 sec 3x SL RLE     Hip ADD ball squeeze 10\" 10x2  Hip ABD 10\" 10x2 isometric belt  Manual piriformis, hamstring, hip ADD, glut stretch 30 sec 3x RLE     Neuro Re-Ed  Les on chair, TA activation 10\" hold 10x DPM, pelvic floor lift  5\" hold on exhale 10x2    Manual Therapy SI MET for R anterior hamstring 10\" 10x  SL L down 3x 8\" hold 2/2 weakness   SI MET for R anterior hamstring 10\" 5x  SL L down 4x 10\" hold    SI MET for R anterior hamstring 10\" 8x  SL over pillow at pelvic crest STM to R QL effleurage at paraspinal  SI MET for R anterior hamstring 10\" 10x at foot last 2 reps     Therapeutic Activity    Trial of back brace, recommended soft brace with compression and stays to support and limit motion. Handouts provided   Education on holding resistance ther ex  now until more stable and tissue less irritable, no ellipictal and likely holding on cable machine until MRI results to avoid exacerbation.   Therapeutic Exercise Min 4 26 20 8   Neuro Re-Ed Min  8 8    Manual Therapy Min 8 8 16 8   Therapeutic Activity Min    24   Evaluation Min 33      Total of Timed Procedures 12 42 44 40   Total of Service Based 33 0 0 0   Total Treatment Time 45 42 44 40         HEP  Access Code: [email or web address]  URL: [email or web address]  Date: 01/14/2025  Prepared by: Julianne Miller    Exercises  - Supine 90/90 Abdominal Bracing  - 1 x daily - 7 x weekly - 3 sets - 10 reps - 5 sec hold    Charges     1 ther ex 1 ther ac 1 manual

## 2025-01-25 RX ORDER — LOSARTAN POTASSIUM 25 MG/1
25 TABLET ORAL DAILY
Qty: 90 TABLET | Refills: 0 | Status: SHIPPED | OUTPATIENT
Start: 2025-01-25

## 2025-01-25 NOTE — TELEPHONE ENCOUNTER
LOV:1/23/25  LAST LAB:12/2/24  LAST RX:  Medication Quantity Refills Start End   losartan 25 MG Oral Tab 90 tablet 3 2/27/2024 2/21/2025   Sig:   Take 1 tablet (25 mg total) by mouth daily.     Next OV:   Future Appointments   Date Time Provider Department Center   1/27/2025  2:00 PM Ruthy Miller PT [facility] [place]   2/17/2025  7:00 PM  [facility]T [facility]    PROTOCOL:  Hypertension Medications Protocol Gltjay9701/25/2025 09:47 AM   Protocol Details CMP or BMP in past 12 months    Last BP reading less than 140/90    In person appointment or virtual visit in the past 12 mos or appointment in next 3 mos    EGFRCR or GFRNAA > 50    Medication is active on med list

## 2025-01-27 ENCOUNTER — TELEPHONE (OUTPATIENT)
Dept: FAMILY MEDICINE CLINIC | Facility: CLINIC | Age: 70
End: 2025-01-27

## 2025-01-27 ENCOUNTER — OFFICE VISIT (OUTPATIENT)
Dept: PHYSICAL THERAPY | Age: 70
End: 2025-01-27
Attending: INTERNAL MEDICINE
Payer: MEDICARE

## 2025-01-27 DIAGNOSIS — M51.360 DEGENERATION OF INTERVERTEBRAL DISC OF LUMBAR REGION WITH DISCOGENIC BACK PAIN: Primary | ICD-10-CM

## 2025-01-27 PROCEDURE — 97112 NEUROMUSCULAR REEDUCATION: CPT

## 2025-01-27 PROCEDURE — 97140 MANUAL THERAPY 1/> REGIONS: CPT

## 2025-01-27 PROCEDURE — 97110 THERAPEUTIC EXERCISES: CPT

## 2025-01-27 NOTE — TELEPHONE ENCOUNTER
She wants the order for the MRI sent to [facility] in [place]   phone # [phone].  Please call her after it has been sent      they have openings this week

## 2025-01-27 NOTE — TELEPHONE ENCOUNTER
Patient advised Dr Morel ordered the MRI at [facility] but she will need to cancel at [facility] so [facility] can get obtain a prior authorization.

## 2025-01-27 NOTE — PROGRESS NOTES
Patient: Armida Suarez (69 year old, female) Referring Provider:  Insurance:   Diagnosis: Acute right-sided low back pain without sciatica (M54.50)  Imbalance (R26.89 Ashely Morel  MEDICARE   Date of Surgery: 2/17/225 MRI Next MD visit:  COMMERCIAL   Precautions:  Fall Risk No data recorded Referral Information:    Date of Evaluation: Req: 0, Auth: 0, Exp:     01/10/25 POC Auth Visits:          Today's Date   1/27/2025    Subjective  Pt states she had an episode of back pain so severe transported EMS on 1/22/24. Saw Dr. Morel 1/23/25. She states she could not straighten her R leg when it occurred, felt her whole back was \"just locked\". She states Dr. Morel has ordered and MRI. She states when the pain occurred, it was so intense, her BP was through the roof and she was crying.She states she is unable to work as the episodes are unpredictable. SHe reports N/T in buttock to tailbone. She states. She states she will schedule MRI as soon as possible. Pain patches do help.       Pain: 6/10 (at rest)     Objective  TTP and hypertonic R QL                Assessment  High tissue irritability this visit and large discrepancy in leg length addressed with MET for R on L anterior inominate. Given instability at SI, focus on isometric exercises and stabilization with ed on back brace to allow for kinesthestic reminder for MRADLs and work until MRI completed and update POC.    Goals (to be met in 8 visits)   Goals       Therapy Goals      Not Met Progress Toward Partially Met Met   Pt will improve transversus abdominis recruitment to perform proper isometric contraction without requiring verbal or tactile cuing to promote advancement of therex. [] [x] [] []   Pt will demonstrate good understanding of proper posture and body mechanics to decrease pain and improve spinal safety. [] [x] [] []   Pt will improve lumbar spine AROM lateral flexion to <60 cm Finger tip to floor to allow increase ease with bending forward to don  shoes. [] [] [] []   Pt will report improved symptom centralization and absence of radicular symptoms for 3 consecutive days to improve function with ADLs. [] [] [] []   Pt will have decreased paraspinal mm tension to tolerate standing >90 minutes for work and home activities. [x] [] [] []   Pt will demonstrate improved core strength to be able to perform modified dead bug with <3/10 pain. [] [x] [] []   Pt will be independent and compliant with comprehensive HEP to maintain progress achieved in PT [] [] [x] []                   Plan  therapeutic hold, pt to RTC following MRI and MD fu.    Treatment Last 4 Visits       1/14/2025 1/17/2025 1/24/2025 1/27/2025   Treatment   Treatment Day 2 3 4 5   Therapeutic Exercise Hip ADD foam roll 10\" 10x  Hip ABD 10\" 10x isometric belt  Manual piriformis stretch 30 sec 3x RLE  Manual QL stretch 30 sec 3x SL RLE Hip ADD foam roll 10\" 10x  Hip ABD 10\" 10x isometric belt  Manual piriformis, hamstring, hip ADD, glut stretch 30 sec 3x RLE  Manual QL stretch 30 sec 3x SL RLE     Hip ADD ball squeeze 10\" 10x2  Hip ABD 10\" 10x2 isometric belt  Manual piriformis, hamstring, hip ADD, glut stretch 30 sec 3x RLE      Neuro Re-Ed Les on chair, TA activation 10\" hold 10x DPM, pelvic floor lift  5\" hold on exhale 10x2     Manual Therapy SI MET for R anterior hamstring 10\" 5x  SL L down 4x 10\" hold    SI MET for R anterior hamstring 10\" 8x  SL over pillow at pelvic crest STM to R QL effleurage at paraspinal  SI MET for R anterior hamstring 10\" 10x at foot last 2 reps      Therapeutic Activity   Trial of back brace, recommended soft brace with compression and stays to support and limit motion. Handouts provided   Education on holding resistance ther ex now until more stable and tissue less irritable, no ellipictal and likely holding on cable machine until MRI results to avoid exacerbation.    Therapeutic Exercise Min 26 20 8    Neuro Re-Ed Min 8 8     Manual Therapy Min 8 16 8    Therapeutic  Activity Min   24    Total of Timed Procedures 42 44 40 0   Total of Service Based 0 0 0 0   Total Treatment Time 42 44 40 0         HEP  Access Code: [email or web address]  URL: [email or web address]  Date: 01/14/2025  Prepared by: Julianne Miller    Exercises  - Supine 90/90 Abdominal Bracing  - 1 x daily - 7 x weekly - 3 sets - 10 reps - 5 sec hold    Charges

## 2025-02-10 ENCOUNTER — OFFICE VISIT (OUTPATIENT)
Dept: FAMILY MEDICINE CLINIC | Facility: CLINIC | Age: 70
End: 2025-02-10
Payer: MEDICARE

## 2025-02-10 VITALS
TEMPERATURE: 99 F | HEART RATE: 72 BPM | WEIGHT: 211.13 LBS | OXYGEN SATURATION: 96 % | RESPIRATION RATE: 18 BRPM | DIASTOLIC BLOOD PRESSURE: 72 MMHG | BODY MASS INDEX: 33 KG/M2 | SYSTOLIC BLOOD PRESSURE: 144 MMHG

## 2025-02-10 DIAGNOSIS — M62.830 LUMBAR PARASPINAL MUSCLE SPASM: ICD-10-CM

## 2025-02-10 PROCEDURE — 99214 OFFICE O/P EST MOD 30 MIN: CPT | Performed by: INTERNAL MEDICINE

## 2025-02-10 RX ORDER — CYCLOBENZAPRINE HCL 5 MG
5 TABLET ORAL 3 TIMES DAILY PRN
Qty: 60 TABLET | Refills: 0 | Status: SHIPPED | OUTPATIENT
Start: 2025-02-10 | End: 2025-03-02

## 2025-02-11 ENCOUNTER — TELEPHONE (OUTPATIENT)
Dept: FAMILY MEDICINE CLINIC | Facility: CLINIC | Age: 70
End: 2025-02-11

## 2025-02-11 NOTE — TELEPHONE ENCOUNTER
[facility] [employer], requesting patient medical records and bills  SENT TO [facility]: 2/11/25

## 2025-02-11 NOTE — PROGRESS NOTES
Armida Suarez is a 69 year old female.  HPI:   Pt is distressed her injury at work 1/22/2025 which sent her to the ER is now under workman's comp and she missed her scheduled MRI until they have time to review the case and approve the imaging.  She has continued to do her stretching at home and just working on stabilizing, she is wearing a back support for now. She is using medications only if needed and very sparingly--motrin and cyclobenzaprine,  I am reluctant to send her back to work until the imaging helps to shed some light on the problem and determine if conservative care is still appropriate.   Current Outpatient Medications   Medication Sig Dispense Refill    cyclobenzaprine 5 MG Oral Tab Take 1 tablet (5 mg total) by mouth 3 (three) times daily as needed for Muscle spasms. 60 tablet 0    LOSARTAN 25 MG Oral Tab TAKE ONE TABLET BY MOUTH DAILY 90 tablet 0    diazePAM 5 MG Oral Tab Take 1 tablet (5 mg total) by mouth.      lidocaine 5 % External Patch Place 1 patch onto the skin daily.      cyclobenzaprine 5 MG Oral Tab Take 1 tablet (5 mg total) by mouth 2 (two) times daily as needed for Muscle spasms.      Cholecalciferol (VITAMIN D-3) 25 MCG (1000 UT) Oral Cap       aspirin 81 MG Oral Tab EC Take 1 tablet (81 mg total) by mouth daily.      atorvastatin 80 MG Oral Tab Take 1 tablet (80 mg total) by mouth nightly.      carvedilol 3.125 MG Oral Tab Take 1 tablet (3.125 mg total) by mouth 2 (two) times daily with meals.      clopidogrel 75 MG Oral Tab Take 1 tablet (75 mg total) by mouth daily.      Coenzyme Q10 (COQ10) 100 MG Oral Cap       estradiol 0.1 MG/GM Vaginal Cream APPLY A PEA SIZED AMOUNT OF CREAM PER VAGINA TWICE WEEKLY      Milk Thistle 1000 MG Oral Cap       Ascorbic Acid (VITAMIN C OR) Take 1,000 mg by mouth 2 (two) times daily.      omega-3 fatty acids 1000 MG Oral Cap Take 1,000 mg by mouth daily.      Lactobacillus (PROBIOTIC ACIDOPHILUS OR) Take 2 tablets by mouth 2 (two) times daily.       albuterol 108 (90 Base) MCG/ACT Inhalation Aero Soln Inhale 2 puffs into the lungs every 4 (four) hours as needed.        Past Medical History:    Abdominal pain, unspecified site    Acute sinusitis, unspecified    Allergic rhinitis, cause unspecified    Bronchitis, not specified as acute or chronic    Disequilibrium    Dizziness and giddiness    04/09/2012    Fuchs' corneal dystrophy    Influenza with other respiratory manifestations    Other psoriasis    Other urinary incontinence    Pure hypercholesterolemia    Sprain of neck      Social History:  Social History     Socioeconomic History    Marital status: OTHER   Tobacco Use    Smoking status: Never    Smokeless tobacco: Never    Tobacco comments:     Current Non smoker    Vaping Use    Vaping status: Never Used   Substance and Sexual Activity    Alcohol use: No     Alcohol/week: 0.0 standard drinks of alcohol    Drug use: No    Sexual activity: Not Currently   Other Topics Concern    Exercise Yes     Comment: 4 x week      Social Drivers of Health     Food Insecurity: No Food Insecurity (1/23/2025)    NCSS - Food Insecurity     Worried About Running Out of Food in the Last Year: No     Ran Out of Food in the Last Year: No   Transportation Needs: No Transportation Needs (1/23/2025)    NCSS - Transportation     Lack of Transportation: No   Housing Stability: Not At Risk (1/23/2025)    NCSS - Housing/Utilities     Has Housing: Yes     Worried About Losing Housing: No     Unable to Get Utilities: No        REVIEW OF SYSTEMS:   GENERAL HEALTH: feels well otherwise  SKIN: denies any unusual skin lesions or rashes  RESPIRATORY: denies shortness of breath with exertion  CARDIOVASCULAR: denies chest pain on exertion  GI: denies abdominal pain and denies heartburn  NEURO: denies headaches    EXAM:   /72   Pulse 72   Temp 98.6 °F (37 °C) (Temporal)   Resp 18   Wt 211 lb 2 oz (95.8 kg)   SpO2 96%   BMI 32.58 kg/m²   GENERAL: well developed, well nourished,in  no apparent distress  SKIN: no rashes,no suspicious lesions  HEENT: atraumatic, normocephalic,ears and throat are clear  NECK: supple,no adenopathy,no bruits  LUNGS: clear to auscultation  CARDIO: RRR without murmur  GI: good BS's,no masses, HSM or tenderness  EXTREMITIES: no cyanosis, clubbing or edema    ASSESSMENT AND PLAN:     Encounter Diagnosis   Name Primary?    Lumbar paraspinal muscle spasm    Discussed with pt that this is protocol and they have already requested records. She needs imaging due to escalation of symptoms when in conservative therapy and she should not feel as if this a setback.  She will be off work another 2 weeks and hopefully we have a determination in that time frame.     No orders of the defined types were placed in this encounter.      Meds & Refills for this Visit:  Requested Prescriptions     Signed Prescriptions Disp Refills    cyclobenzaprine 5 MG Oral Tab 60 tablet 0     Sig: Take 1 tablet (5 mg total) by mouth 3 (three) times daily as needed for Muscle spasms.       Imaging & Consults:  None    Follow up as needed.    The patient indicates understanding of these issues and agrees to the plan.

## 2025-02-18 ENCOUNTER — TELEPHONE (OUTPATIENT)
Dept: FAMILY MEDICINE CLINIC | Facility: CLINIC | Age: 70
End: 2025-02-18

## 2025-02-18 NOTE — TELEPHONE ENCOUNTER
Patient calling to let nurse know she was able to get MRI scheduled March 17th at 6:00pm at [facility], if  wants it sooner a STAT order needs to be placed.

## 2025-02-18 NOTE — TELEPHONE ENCOUNTER
Patient said that when she spoke with [facility] on Friday they told her that the MRI would be covered by insurance. She said that when she went there today they told her that they cannot do the MRI there because she has a nerve stimulator in her sacral nerve. They said they do not have a nurse there to turn the stimulator back on. She was crying because she really wanted to have the MRI today. She was having back pain and pain into her toes on right foot. She cannot go back to work until she has the MRI.  She said she will have have to have it done through [facility]. She is wondering if it needs to be reordered to get approval with insurance.

## 2025-02-18 NOTE — TELEPHONE ENCOUNTER
Patient advised Dr Morel cannot order the test STAT. She is coming in to see Dr Morel on 2/24/25.

## 2025-02-18 NOTE — TELEPHONE ENCOUNTER
She is where she is suppose to be to get the MRI done this morning and they told her that they can't do it there.  They told her she needs to be in a hospital for this MRI.  She was crying when she hung up

## 2025-02-24 ENCOUNTER — OFFICE VISIT (OUTPATIENT)
Dept: FAMILY MEDICINE CLINIC | Facility: CLINIC | Age: 70
End: 2025-02-24
Payer: MEDICARE

## 2025-02-24 VITALS
SYSTOLIC BLOOD PRESSURE: 132 MMHG | OXYGEN SATURATION: 96 % | RESPIRATION RATE: 18 BRPM | DIASTOLIC BLOOD PRESSURE: 76 MMHG | TEMPERATURE: 98 F | HEART RATE: 78 BPM

## 2025-02-24 DIAGNOSIS — M62.830 LUMBAR PARASPINAL MUSCLE SPASM: Primary | ICD-10-CM

## 2025-02-24 PROCEDURE — 99213 OFFICE O/P EST LOW 20 MIN: CPT | Performed by: INTERNAL MEDICINE

## 2025-02-24 NOTE — PROGRESS NOTES
Armida Suarez is a 69 year old female.  HPI:   Pt has been struggling with back pain.  She was very disappointed when her MRI had to be rescheduled due to implanted bladder stimulator. 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  She had another episode of right SI locking when trying to step over a baby gate at her sons this weekend.     Current Outpatient Medications   Medication Sig Dispense Refill    cyclobenzaprine 5 MG Oral Tab Take 1 tablet (5 mg total) by mouth 3 (three) times daily as needed for Muscle spasms. 60 tablet 0    LOSARTAN 25 MG Oral Tab TAKE ONE TABLET BY MOUTH DAILY 90 tablet 0    diazePAM 5 MG Oral Tab Take 1 tablet (5 mg total) by mouth.      lidocaine 5 % External Patch Place 1 patch onto the skin daily.      cyclobenzaprine 5 MG Oral Tab Take 1 tablet (5 mg total) by mouth 2 (two) times daily as needed for Muscle spasms.      Cholecalciferol (VITAMIN D-3) 25 MCG (1000 UT) Oral Cap       aspirin 81 MG Oral Tab EC Take 1 tablet (81 mg total) by mouth daily.      atorvastatin 80 MG Oral Tab Take 1 tablet (80 mg total) by mouth nightly.      carvedilol 3.125 MG Oral Tab Take 1 tablet (3.125 mg total) by mouth 2 (two) times daily with meals.      clopidogrel 75 MG Oral Tab Take 1 tablet (75 mg total) by mouth daily.      Coenzyme Q10 (COQ10) 100 MG Oral Cap       estradiol 0.1 MG/GM Vaginal Cream APPLY A PEA SIZED AMOUNT OF CREAM PER VAGINA TWICE WEEKLY      Milk Thistle 1000 MG Oral Cap       Ascorbic Acid (VITAMIN C OR) Take 1,000 mg by mouth 2 (two) times daily.      omega-3 fatty acids 1000 MG Oral Cap Take 1,000 mg by mouth daily.      Lactobacillus (PROBIOTIC ACIDOPHILUS OR) Take 2 tablets by mouth 2 (two) times daily.        Past Medical History:    Abdominal pain, unspecified site    Acute sinusitis, unspecified    Allergic rhinitis, cause unspecified    Bronchitis, not specified as acute or chronic    Disequilibrium    Dizziness and  giddiness    04/09/2012    Fuchs' corneal dystrophy    Influenza with other respiratory manifestations    Other psoriasis    Other urinary incontinence    Pure hypercholesterolemia    Sprain of neck      Social History:  Social History     Socioeconomic History    Marital status: OTHER   Tobacco Use    Smoking status: Never    Smokeless tobacco: Never    Tobacco comments:     Current Non smoker    Vaping Use    Vaping status: Never Used   Substance and Sexual Activity    Alcohol use: No     Alcohol/week: 0.0 standard drinks of alcohol    Drug use: No    Sexual activity: Not Currently   Other Topics Concern    Exercise Yes     Comment: 4 x week      Social Drivers of Health     Food Insecurity: No Food Insecurity (1/23/2025)    NCSS - Food Insecurity     Worried About Running Out of Food in the Last Year: No     Ran Out of Food in the Last Year: No   Transportation Needs: No Transportation Needs (1/23/2025)    NCSS - Transportation     Lack of Transportation: No   Housing Stability: Not At Risk (1/23/2025)    NCSS - Housing/Utilities     Has Housing: Yes     Worried About Losing Housing: No     Unable to Get Utilities: No        REVIEW OF SYSTEMS:   GENERAL HEALTH: feels well otherwise  SKIN: denies any unusual skin lesions or rashes  RESPIRATORY: denies shortness of breath with exertion  CARDIOVASCULAR: denies chest pain on exertion  GI: denies abdominal pain and denies heartburn  NEURO: denies headaches    EXAM:   /76   Pulse 78   Temp 98.2 °F (36.8 °C) (Temporal)   Resp 18   SpO2 96%   GENERAL: well developed, well nourished,in no apparent distress  SKIN: no rashes,no suspicious lesions  HEENT: atraumatic, normocephalic,ears and throat are clear  NECK: supple,no adenopathy,no bruits  LUNGS: clear to auscultation  CARDIO: RRR without murmur  GI: good BS's,no masses, HSM or tenderness  EXTREMITIES: no cyanosis, clubbing or edema    ASSESSMENT AND PLAN:     Encounter Diagnosis   Name Primary?    Lumbar  paraspinal muscle spasm Yes   MRI is rescheduled, resume PT and see me after imaging    No orders of the defined types were placed in this encounter.      Meds & Refills for this Visit:  Requested Prescriptions      No prescriptions requested or ordered in this encounter       Imaging & Consults:  None    Follow up as needed.     The patient indicates understanding of these issues and agrees to the plan.

## 2025-02-25 ENCOUNTER — TELEPHONE (OUTPATIENT)
Dept: FAMILY MEDICINE CLINIC | Facility: CLINIC | Age: 70
End: 2025-02-25

## 2025-02-25 NOTE — TELEPHONE ENCOUNTER
Patient said that in order for workman's comp to pay she needs to be employed. She needs to get the note to he boss before 2pm today. She said if you can only write the note for 2 weeks at a time that is fine.

## 2025-02-25 NOTE — TELEPHONE ENCOUNTER
NEED NOTE TO BE OFF WORK UNTIL SHE GETS MRI DONE 3/17, SHE ALSO HAS APPOINTMENT WITH DR BROWNLEE 3/18, CALL PATIENT BACK

## 2025-03-06 ENCOUNTER — OFFICE VISIT (OUTPATIENT)
Dept: PHYSICAL THERAPY | Age: 70
End: 2025-03-06
Attending: INTERNAL MEDICINE
Payer: MEDICARE

## 2025-03-06 ENCOUNTER — TELEPHONE (OUTPATIENT)
Dept: FAMILY MEDICINE CLINIC | Facility: CLINIC | Age: 70
End: 2025-03-06

## 2025-03-06 PROCEDURE — 97140 MANUAL THERAPY 1/> REGIONS: CPT

## 2025-03-06 PROCEDURE — 97110 THERAPEUTIC EXERCISES: CPT

## 2025-03-06 NOTE — PROGRESS NOTES
Patient: Armida Suarez (69 year old, female) Referring Provider:  Insurance:   Diagnosis: Acute right-sided low back pain without sciatica (M54.50)  Imbalance ([id number] Ashely Adriel  MEDICARE   Date of Surgery: 2/17/225 MRI Next MD visit:  COMMERCIAL   Precautions:  Fall Risk No data recorded Referral Information:    Date of Evaluation: Req: 0, Auth: 0, Exp:     01/10/25 POC Auth Visits:          Today's Date   3/6/2025    Subjective  Pt with MRI scheduled 3/7/25. She states last weekend, she had severe pain Fri, Sat and Sunday. Like it was \"going out\" She states she had to medicate herself just to function. Pain in \"hump\" in R side of low back. She states she has a \"warning sign\" of last three toes hurt before back goes out. She states she has been trying to walk on the treadmill. She states when her back \"goes out\" her bladder is impacted and she cannot hold her urine.       Pain: 5/10 (thick R side of low back, above pelvis)     Objective  LLD R 96 cm LLE 98 cm end of visit both 98 cm            Assessment  LLD improved by end of visit but functional LLD likely to return 2/2 guarding and high tissue irritability.    Goals (to be met in 8 visits)   See eval     Plan  Address pain and core stability as tolerable with change in POC pending MRI and neurosurgery consult.    Treatment Last 4 Visits       1/17/2025 1/24/2025 1/27/2025 3/6/2025   PT Treatment   Treatment Day 3 4 5 6   Therapeutic Exercise Hip ADD foam roll 10\" 10x  Hip ABD 10\" 10x isometric belt  Manual piriformis, hamstring, hip ADD, glut stretch 30 sec 3x RLE  Manual QL stretch 30 sec 3x SL RLE     Hip ADD ball squeeze 10\" 10x2  Hip ABD 10\" 10x2 isometric belt  Manual piriformis, hamstring, hip ADD, glut stretch 30 sec 3x RLE   Hip ADD ball squeeze 10\" 10x2  Hip ABD 10\" 10x2 isometric belt  Manual piriformis, hamstring, hip ADD, glut stretch 30 sec 3x RLE  SL SKTC 30 sec 3x RLE     Neuro Re-Ed DPM, pelvic floor lift  5\" hold on exhale 10x2  Seated  Foam roll push downs 15x 3\" hold with exhale  Seated EOB belt about thighs, hip ABD 15x 3\" hold     Manual Therapy SI MET for R anterior hamstring 10\" 8x  SL over pillow at pelvic crest STM to R QL effleurage at paraspinal  SI MET for R anterior hamstring 10\" 10x at foot last 2 reps   SI MET for R anterior hamstring 10\" 10x at foot last 2 reps  SL L hip down, MET 5x 10\" hold   STM cross friction and effleurage R glut/ QL PSIS    Therapeutic Activity  Trial of back brace, recommended soft brace with compression and stays to support and limit motion. Handouts provided   Education on holding resistance ther ex now until more stable and tissue less irritable, no ellipictal and likely holding on cable machine until MRI results to avoid exacerbation. Back brace adjustment    Therapeutic Exercise Min 20 8 13    Neuro Re-Ed Min 8  15    Manual Therapy Min 16 8 10    Therapeutic Activity Min  24 3    Total of Timed Procedures 44 40 41    Total of Service Based 0 0 0    Total Treatment Time 44 40 41           1/17/2025 1/24/2025 1/27/2025 3/6/2025   Spine Treatment   Treatment Day 3 4 5 6   Therapeutic Exercise    Hip ADD ball squeeze 5\" 10x2   Hip ABD 5\" 10x2 isometric belt   Manual piriformis, hamstring, hip ADD, glut stretch 30 sec 3x RLE   SL SKTC 30 sec 3x RLE      Manual Therapy    Isometric hamstring MET for R inomaintate, no change after 10 and 10 sec hold , another 10 reps after stretching improved LLD, then another 5 reps    Therapeutic Exercise Minutes    15   Manual Therapy Minutes    24   Total Time Of Timed Procedures   0 39   Total Time Of Service-Based Procedures   0 0   Total Treatment Time   0 39        HEP       Charges  2 manual 1 ther ex

## 2025-03-06 NOTE — TELEPHONE ENCOUNTER
Mri is tomorrow at 7:15 pm, had cancellation and was able to push up date, patient wondering if follow up on the 18th needs to be moved ahead or if can stay on the 18th

## 2025-03-07 ENCOUNTER — HOSPITAL ENCOUNTER (OUTPATIENT)
Dept: MRI IMAGING | Facility: HOSPITAL | Age: 70
Discharge: HOME | End: 2025-03-07
Attending: INTERNAL MEDICINE
Payer: MEDICARE

## 2025-03-07 DIAGNOSIS — M54.16 LUMBAR RADICULAR PAIN: ICD-10-CM

## 2025-03-07 PROCEDURE — 72148 MRI LUMBAR SPINE W/O DYE: CPT | Performed by: INTERNAL MEDICINE

## 2025-03-11 ENCOUNTER — OFFICE VISIT (OUTPATIENT)
Dept: PHYSICAL THERAPY | Age: 70
End: 2025-03-11
Attending: INTERNAL MEDICINE
Payer: MEDICARE

## 2025-03-11 ENCOUNTER — TELEPHONE (OUTPATIENT)
Dept: PHYSICAL THERAPY | Age: 70
End: 2025-03-11

## 2025-03-11 ENCOUNTER — TELEPHONE (OUTPATIENT)
Facility: CLINIC | Age: 70
End: 2025-03-11

## 2025-03-11 ENCOUNTER — OFFICE VISIT (OUTPATIENT)
Dept: FAMILY MEDICINE CLINIC | Facility: CLINIC | Age: 70
End: 2025-03-11
Payer: MEDICARE

## 2025-03-11 VITALS
OXYGEN SATURATION: 96 % | TEMPERATURE: 98 F | RESPIRATION RATE: 18 BRPM | HEART RATE: 77 BPM | BODY MASS INDEX: 33 KG/M2 | SYSTOLIC BLOOD PRESSURE: 130 MMHG | DIASTOLIC BLOOD PRESSURE: 68 MMHG | WEIGHT: 212.13 LBS

## 2025-03-11 DIAGNOSIS — M51.360 DEGENERATION OF INTERVERTEBRAL DISC OF LUMBAR REGION WITH DISCOGENIC BACK PAIN: Primary | ICD-10-CM

## 2025-03-11 PROCEDURE — 97110 THERAPEUTIC EXERCISES: CPT

## 2025-03-11 PROCEDURE — 99214 OFFICE O/P EST MOD 30 MIN: CPT | Performed by: INTERNAL MEDICINE

## 2025-03-11 PROCEDURE — 97140 MANUAL THERAPY 1/> REGIONS: CPT

## 2025-03-11 NOTE — PROGRESS NOTES
Patient: Armida Suarez (69 year old, female) Referring Provider:  Insurance:   Diagnosis: Acute right-sided low back pain without sciatica (M54.50)  Imbalance ([id number] Ashely Adriel  MEDICARE   Date of Surgery: 2/17/225 MRI Next MD visit:  COMMERCIAL   Precautions:  Fall Risk No data recorded Referral Information:    Date of Evaluation: Req: 0, Auth: 0, Exp:     01/10/25 POC Auth Visits:          Today's Date   3/11/2025    Subjective  Pt with MRI result 3/7/25 Pt with referral to neurosx Dr. Quiroz. She reports after last visit, she had pain enough that she had to take a flexeril. Standing tolerance veyr limitied diffuclty standing to prepare to soup.       Pain:       Objective  TTP R PSIS  along iliac crest, thoracic kyphosis, rib hump L MRI results from 3/7/25: FINDINGS: LUMBAR DISC LEVELS L1-L2: Facet arthropathy. No significant canal or neural foraminal stenosis. L2-L3: Mild broad-based posterior disc osteophyte complex and facet arthropathy. Minimal canal stenosis. No significant neural foraminal stenosis. L3-L4: Mild broad-based posterior disc bulge and facet arthropathy. Minimal canal stenosis. The right neural foramen is patent. Mild left neural foraminal stenosis. L4-L5: Mild broad-based posterior disc bulge and facet arthropathy. The canal is patent. Mild bilateral neural foraminal stenosis. L5-S1: Bilateral facet arthropathy. No significant canal or neural foraminal stenosis. PARASPINAL AREA: Normal with no visible mass. BONY STRUCTURES: Chronic appearing compression deformity at T11, no significant bony retropulsion. Probable intraosseous hemangioma at T11. Schmorl's node at L3 superior endplate. CORD/CAUDA EQUINA: Normal caliber, contour, and signal intensit          Assessment  Pain and LLD improved end of visit, focus on stability with breath support    Goals (to be met in 8 visits)   See eval         Plan  Plan to continue PT for pain management and core stability as tolerable with change in POC  pending neurosurgery consult.    Treatment Last 4 Visits       1/24/2025 1/27/2025 3/6/2025 3/11/2025   PT Treatment   Treatment Day 4 5 6 7   Therapeutic Exercise Hip ADD ball squeeze 10\" 10x2  Hip ABD 10\" 10x2 isometric belt  Manual piriformis, hamstring, hip ADD, glut stretch 30 sec 3x RLE   Hip ADD ball squeeze 10\" 10x2  Hip ABD 10\" 10x2 isometric belt  Manual piriformis, hamstring, hip ADD, glut stretch 30 sec 3x RLE  SL SKTC 30 sec 3x RLE      Neuro Re-Ed  Seated Foam roll push downs 15x 3\" hold with exhale  Seated EOB belt about thighs, hip ABD 15x 3\" hold      Manual Therapy SI MET for R anterior hamstring 10\" 10x at foot last 2 reps   SI MET for R anterior hamstring 10\" 10x at foot last 2 reps  SL L hip down, MET 5x 10\" hold   STM cross friction and effleurage R glut/ QL PSIS     Therapeutic Activity Trial of back brace, recommended soft brace with compression and stays to support and limit motion. Handouts provided   Education on holding resistance ther ex now until more stable and tissue less irritable, no ellipictal and likely holding on cable machine until MRI results to avoid exacerbation. Back brace adjustment     Therapeutic Exercise Min 8 13     Neuro Re-Ed Min  15     Manual Therapy Min 8 10     Therapeutic Activity Min 24 3     Total of Timed Procedures 40 41     Total of Service Based 0 0     Total Treatment Time 40 41            1/24/2025 1/27/2025 3/6/2025 3/11/2025   Spine Treatment   Treatment Day 4 5 6 7   Therapeutic Exercise   Hip ADD ball squeeze 5\" 10x2   Hip ABD 5\" 10x2 isometric belt   Manual piriformis, hamstring, hip ADD, glut stretch 30 sec 3x RLE   SL SKTC 30 sec 3x RLE    Hip ADD ball squeeze 5\" 10x2 with TA brace   Hip ABD 5\" 10x2 isometric belt  with TA brace and breath support  Manual piriformis, hamstring, hip ADD, glut stretch 30 sec 3x RLE piriformis stretch LLE   SL SKTC 30 sec 3x RLE     Manual Therapy   Isometric hamstring MET for R inomaintate, no change after 10 and 10  sec hold , another 10 reps after stretching improved LLD, then another 5 reps  STM SL R glut med/max gentle cross friction  Isometric hamstring MET for R inomaintate, 12x       Therapeutic Exercise Minutes   15 24   Manual Therapy Minutes   24 16   Total Time Of Timed Procedures  0 39 40   Total Time Of Service-Based Procedures  0 0 0   Total Treatment Time  0 39 40        HEP       Charges  2 ther ex 1 manual

## 2025-03-11 NOTE — PROGRESS NOTES
Armida Suarez is a 69 year old female.  HPI:   Pt has had pain and dysfunction of the low back and SI joint  on the right.  She has been in conservative therapy, but twice now it has \"frozen\" and she has not been able to move. Once at work and she was sent to the ER by ambulance. MRI shows:  PROCEDURE:  MRI SPINE LUMBAR (CPT=72148)     COMPARISON:  None.     INDICATIONS:  M54.16 Lumbar radicular pain     TECHNIQUE:  Multiplanar T1 and T2 weighted images including fat suppression sequences.  Images acquired in sagittal and axial planes.       PATIENT STATED HISTORY: (As transcribed by Technologist)  Patient complains of chronic lower back pain with right leg pain, numbness, and tingling.         FINDINGS:    LUMBAR DISC LEVELS  L1-L2:  Facet arthropathy.  No significant canal or neural foraminal stenosis.  L2-L3:  Mild broad-based posterior disc osteophyte complex and facet arthropathy.  Minimal canal stenosis.  No significant neural foraminal stenosis.  L3-L4:  Mild broad-based posterior disc bulge and facet arthropathy.  Minimal canal stenosis.  The right neural foramen is patent.  Mild left neural foraminal stenosis.  L4-L5:  Mild broad-based posterior disc bulge and facet arthropathy.  The canal is patent.  Mild bilateral neural foraminal stenosis.  L5-S1:  Bilateral facet arthropathy.  No significant canal or neural foraminal stenosis.     PARASPINAL AREA:  Normal with no visible mass.    BONY STRUCTURES:  Chronic appearing compression deformity at T11, no significant bony retropulsion.  Probable intraosseous hemangioma at T11.  Schmorl's node at L3 superior endplate.  CORD/CAUDA EQUINA:  Normal caliber, contour, and signal intensity.                     Impression   CONCLUSION:    1. Multilevel degenerative changes as detailed.  No evidence of high-grade canal or neural foraminal stenosis.  Please see above for further details and level by level assessment.  2. Chronic appearing compression fracture at  T11.      She has resumed PT. She found an MRI from 2014 at an outside facility. She has not been back to work.   Current Outpatient Medications   Medication Sig Dispense Refill    LOSARTAN 25 MG Oral Tab TAKE ONE TABLET BY MOUTH DAILY 90 tablet 0    diazePAM 5 MG Oral Tab Take 1 tablet (5 mg total) by mouth.      lidocaine 5 % External Patch Place 1 patch onto the skin daily.      cyclobenzaprine 5 MG Oral Tab Take 1 tablet (5 mg total) by mouth 2 (two) times daily as needed for Muscle spasms.      Cholecalciferol (VITAMIN D-3) 25 MCG (1000 UT) Oral Cap       aspirin 81 MG Oral Tab EC Take 1 tablet (81 mg total) by mouth daily.      atorvastatin 80 MG Oral Tab Take 1 tablet (80 mg total) by mouth nightly.      carvedilol 3.125 MG Oral Tab Take 1 tablet (3.125 mg total) by mouth 2 (two) times daily with meals.      Coenzyme Q10 (COQ10) 100 MG Oral Cap       estradiol 0.1 MG/GM Vaginal Cream APPLY A PEA SIZED AMOUNT OF CREAM PER VAGINA TWICE WEEKLY      Milk Thistle 1000 MG Oral Cap       Ascorbic Acid (VITAMIN C OR) Take 1,000 mg by mouth 2 (two) times daily.      omega-3 fatty acids 1000 MG Oral Cap Take 1,000 mg by mouth daily.      Lactobacillus (PROBIOTIC ACIDOPHILUS OR) Take 2 tablets by mouth 2 (two) times daily.        Past Medical History:    Abdominal pain, unspecified site    Acute sinusitis, unspecified    Allergic rhinitis, cause unspecified    Bronchitis, not specified as acute or chronic    Disequilibrium    Dizziness and giddiness    04/09/2012    Fuchs' corneal dystrophy    Influenza with other respiratory manifestations    Other psoriasis    Other urinary incontinence    Pure hypercholesterolemia    Sprain of neck      Social History:  Social History     Socioeconomic History    Marital status: OTHER   Tobacco Use    Smoking status: Never    Smokeless tobacco: Never    Tobacco comments:     Current Non smoker    Vaping Use    Vaping status: Never Used   Substance and Sexual Activity    Alcohol use:  No     Alcohol/week: 0.0 standard drinks of alcohol    Drug use: No    Sexual activity: Not Currently   Other Topics Concern    Exercise Yes     Comment: 4 x week      Social Drivers of Health     Food Insecurity: No Food Insecurity (1/23/2025)    NCSS - Food Insecurity     Worried About Running Out of Food in the Last Year: No     Ran Out of Food in the Last Year: No   Transportation Needs: No Transportation Needs (1/23/2025)    NCSS - Transportation     Lack of Transportation: No   Housing Stability: Not At Risk (1/23/2025)    NCSS - Housing/Utilities     Has Housing: Yes     Worried About Losing Housing: No     Unable to Get Utilities: No        REVIEW OF SYSTEMS:   GENERAL HEALTH: feels well otherwise  SKIN: denies any unusual skin lesions or rashes  RESPIRATORY: denies shortness of breath with exertion  CARDIOVASCULAR: denies chest pain on exertion  GI: denies abdominal pain and denies heartburn  NEURO: denies headaches    EXAM:   /68   Pulse 77   Temp 97.9 °F (36.6 °C) (Temporal)   Resp 18   Wt 212 lb 2 oz (96.2 kg)   SpO2 96%   BMI 32.73 kg/m²   GENERAL: well developed, well nourished,in no apparent distress  SKIN: no rashes,no suspicious lesions  HEENT: atraumatic, normocephalic,ears and throat are clear  NECK: supple,no adenopathy,no bruits  LUNGS: clear to auscultation  CARDIO: RRR without murmur  GI: good BS's,no masses, HSM or tenderness  EXTREMITIES: no cyanosis, clubbing or edema  M/S: straight leg to 90 degrees bilaterally, pain in the right T11L1 paravertebral  ASSESSMENT AND PLAN:     Encounter Diagnosis   Name Primary?    Degeneration of intervertebral disc of lumbar region with discogenic back pain Yes       No orders of the defined types were placed in this encounter.      Meds & Refills for this Visit:  Requested Prescriptions      No prescriptions requested or ordered in this encounter       Imaging & Consults:  ORTHOPEDIC - INTERNAL    Follow up as needed.    The patient indicates  understanding of these issues and agrees to the plan.

## 2025-03-11 NOTE — TELEPHONE ENCOUNTER
Patient scheduled for lumbar back pain and will bring MRI disc. Please advise if further imaging is needed.   Future Appointments   Date Time Provider Department Center   3/13/2025 11:00 AM Lorenzo Quiroz MD [facility]   3/20/2025 11:30 AM Ruthy Miller PT [facility]   3/27/2025  1:15 PM Ruthy Miller PT [facility]   4/3/2025 11:30 AM Ruthy Miller PT [facility]

## 2025-03-13 ENCOUNTER — OFFICE VISIT (OUTPATIENT)
Dept: ORTHOPEDICS CLINIC | Facility: CLINIC | Age: 70
End: 2025-03-13
Payer: MEDICARE

## 2025-03-13 DIAGNOSIS — M54.41 ACUTE RIGHT-SIDED LOW BACK PAIN WITH RIGHT-SIDED SCIATICA: Primary | ICD-10-CM

## 2025-03-13 PROCEDURE — 99204 OFFICE O/P NEW MOD 45 MIN: CPT | Performed by: STUDENT IN AN ORGANIZED HEALTH CARE EDUCATION/TRAINING PROGRAM

## 2025-03-14 ENCOUNTER — TELEPHONE (OUTPATIENT)
Dept: FAMILY MEDICINE CLINIC | Facility: CLINIC | Age: 70
End: 2025-03-14

## 2025-03-14 NOTE — H&P
[facility] - ORTHOPEDICS  [address], [address], [place] [address]  [address] [place] [id number]  [phone]     NEW PATIENT VISIT - HISTORY AND PHYSICAL EXAMINATION     Name: Armida Suarez   MRN: [id number]  Date: 03/13/25       CC: back pain    REFERRED BY: Ashely Morel MD    HPI:   Armida Suarez is a very pleasant 69 year old female who presents today for evaluation of back pain. The distribution of symptoms are: 100% backpain and 0% leg pain. 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 Since the onset, the symptoms have remained the same. The patient reports no numbness and no weakness.  The symptom characteristics are as follows: Patient is a 69-year-old female presenting with predominately low back pain symptoms.  No radicular pain.     Prior spine surgery: none.    Bowel and bladder symptoms: absent.    The patient has not had issues with balance and/or hand dexterity problems such as changes in penmanship or the use of buttons or zippers.    Treatment up to this time has included:    Evaluation: PCP  NSAIDS: have worked well  Narcotic use: None  Physical therapy: Ongoing  Spinal injections: None  Others:       PMH:   Past Medical History:    Abdominal pain, unspecified site    Acute sinusitis, unspecified    Allergic rhinitis, cause unspecified    Bronchitis, not specified as acute or chronic    Disequilibrium    Dizziness and giddiness    04/09/2012    Fuchs' corneal dystrophy    Influenza with other respiratory manifestations    Other psoriasis    Other urinary incontinence    Pure hypercholesterolemia    Sprain of neck       PAST SURGICAL HX:  Past Surgical History:   Procedure Laterality Date    Hysterectomy      Complete (no cancer)     Oophorectomy         FAMILY HX:  Family History   Problem Relation Age of Onset    Other (Other) Mother         MVA     Breast Cancer Maternal Aunt 60    Breast Cancer Maternal Aunt 50    Pulmonary Disease Other         Grandmother with PUD     Cancer Other         Lung cancer    Breast Cancer Cousin 50       ALLERGIES:  Ropinirole and Seasonal    MEDICATIONS:   Current Outpatient Medications   Medication Sig Dispense Refill    LOSARTAN 25 MG Oral Tab TAKE ONE TABLET BY MOUTH DAILY 90 tablet 0    diazePAM 5 MG Oral Tab Take 1 tablet (5 mg total) by mouth.      lidocaine 5 % External Patch Place 1 patch onto the skin daily.      cyclobenzaprine 5 MG Oral Tab Take 1 tablet (5 mg total) by mouth 2 (two) times daily as needed for Muscle spasms.      Cholecalciferol (VITAMIN D-3) 25 MCG (1000 UT) Oral Cap       aspirin 81 MG Oral Tab EC Take 1 tablet (81 mg total) by mouth daily.      atorvastatin 80 MG Oral Tab Take 1 tablet (80 mg total) by mouth nightly.      carvedilol 3.125 MG Oral Tab Take 1 tablet (3.125 mg total) by mouth 2 (two) times daily with meals.      Coenzyme Q10 (COQ10) 100 MG Oral Cap       estradiol 0.1 MG/GM Vaginal Cream APPLY A PEA SIZED AMOUNT OF CREAM PER VAGINA TWICE WEEKLY      Milk Thistle 1000 MG Oral Cap       Ascorbic Acid (VITAMIN C OR) Take 1,000 mg by mouth 2 (two) times daily.      omega-3 fatty acids 1000 MG Oral Cap Take 1,000 mg by mouth daily.      Lactobacillus (PROBIOTIC ACIDOPHILUS OR) Take 2 tablets by mouth 2 (two) times daily.         ROS: A comprehensive 14 point review of systems was performed and was negative aside from the aforementioned per history of present illness.    SOCIAL HX:  Social History     Tobacco Use    Smoking status: Never    Smokeless tobacco: Never    Tobacco comments:     Current Non smoker    Substance Use Topics    Alcohol use: No     Alcohol/week: 0.0 standard drinks of alcohol         PE:   There were no vitals filed for this visit.  Estimated body mass index is 32.73 kg/m² as calculated from the following:    Height as of 11/14/24: 5' 7.5\" (1.715 m).    Weight as of  3/11/25: 212 lb 2 oz (96.2 kg).    Physical Exam  Constitutional:       Appearance: Normal appearance.   HENT:      Head: Normocephalic and atraumatic.   Eyes:      Extraocular Movements: Extraocular movements intact.   Cardiovascular:      Pulses: Normal pulses. Skin warm and well perfused.  Pulmonary:      Effort: Pulmonary effort is normal. No respiratory distress.   Skin:     General: Skin is warm.   Psychiatric:         Mood and Affect: Mood normal.     Spine Exam:    Normal gait without difficulty  Able to heel, toe, tandem gait without difficulty  Level shoulders and hips in even stance    Restricted L-spine ROM    No tenderness to palpation of L-spine    Straight leg raise test: negative    Sustained clonus: negative    LE Strength: 5/5 IP QUAD TA EHL GSC  LE Sensation: normal in L2-S1 distribution  LE reflexes: normal    Radiographic Examination/Diagnostics:  XR and MRI personally viewed, independently interpreted and radiology report was reviewed.  X-ray of the lumbar spine demonstrates mild degenerative changes  MRI of the lumbar spine demonstrates mild disc degeneration without significant canal or foraminal stenosis      IMPRESSION: Armida Suarez is a 69 year old female with low back pain, no significant radicular symptoms.  No evidence of neural compression on imaging    PLAN:     We reviewed the patients history, symptoms, exam findings, and imaging today.  We had a detailed discussion outlining the etiology, anatomy, pathophysiology, and natural history of low back pain. The typical management of this condition may include lifestyle modification, NSAIDs, physical therapy.  Based on our discussion today we would like to have the patient initiate our recommendations for continued conservative therapy in the treatment of their condition noted in the assessment section.     - No surgical intervention recommended  - Continue therapy  - Referred patient to physiatry for TPI, facet block, possible SI  joint injection    Lorenzo Quiroz MD  Orthopedic Spine Surgeon  [facility]   [address], [address], [place], [place] [address] [place] [address].[facility]  [email or web address]  t: [phone]   f: [phone]        This note was dictated using Dragon software.  While it was briefly proofread prior to completion, some grammatical, spelling, and word choice errors due to dictation may still occur.

## 2025-03-14 NOTE — TELEPHONE ENCOUNTER
Spoke with pt re: below.  Saw Dr. Quiroz yesterday and was referred to Dr. Musa Rebollar for possible injections. She is scheduled on 4/9/25 in [place].  Pt asks if she can get a note for work to remain off until at least that appt?    She will pick it up at the  on 3/18

## 2025-03-14 NOTE — TELEPHONE ENCOUNTER
Patient seen Dr. Quiroz yesterday and he is referring patient to Dr. Rebollar for possible injections. Patient is requesting an extended not for work, she does not see Dr. Rebollar until 4/9/25.

## 2025-03-20 ENCOUNTER — OFFICE VISIT (OUTPATIENT)
Dept: PHYSICAL THERAPY | Age: 70
End: 2025-03-20
Attending: INTERNAL MEDICINE
Payer: MEDICARE

## 2025-03-20 PROCEDURE — 97112 NEUROMUSCULAR REEDUCATION: CPT

## 2025-03-20 PROCEDURE — 97140 MANUAL THERAPY 1/> REGIONS: CPT

## 2025-03-20 PROCEDURE — 97110 THERAPEUTIC EXERCISES: CPT

## 2025-03-20 NOTE — PROGRESS NOTES
Patient: Armida Suarez (69 year old, female) Referring Provider:  Insurance:   Diagnosis: Acute right-sided low back pain without sciatica (M54.50)  Imbalance (R26.89 Ashely Adriel  MEDICARE   Date of Surgery: 2/17/225 MRI Next MD visit:  COMMERCIAL   Precautions:  Fall Risk 4/9/25- Dr. Uribe Referral Information:    Date of Evaluation: Req: 0, Auth: 0, Exp:     01/10/25 POC Auth Visits:  15      Progress Summary  Pt has attended 8 visits Physical Therapy.     Today's Date   3/20/2025    Subjective  Pt had apt with Dr. Quiroz who recommended continue PT and conservative tx. Pt to schedule with PMR for further txs. She reports she felt better after seeing sx and special testing, may have improve SI positoining with provocative tests. She states she has been walking and doing more household chores with a greater step count. She reports more pressure at sacrum wiht pain going out laterally. She has been able to manage the sense of instability with ice and medication. Surgeon states the pain in her toes is related to sciatica. She does report pain in toes currently. SHe states standing tolerance about 60 mins.       Pain: 2/10     Objective           L spine AROM SB R 56 cm L 60.5 cm Finger tip to floor     Assessment  Pt making progress in PT with core stabilization. Tissue irritability remains limiting with greater toe pain reported end of visit but improvement in SI pain and pressure.  Given recommendation for conservative treatment, PT and pt agree to maximize therapy for pain control, strength and ROM to faciltiate return to PLOF for return work with lower risk of reinjury.    Goals (to be met in 16 visits)     Goals         Therapy Goals         Not Met Progress Toward Partially Met Met   Pt will improve transversus abdominis recruitment to perform proper isometric contraction without requiring verbal or tactile cuing to promote advancement of therex. []  [x]  []  []    Pt will demonstrate good understanding of  proper posture and body mechanics to decrease pain and improve spinal safety. []  [x]  []  []    Pt will improve lumbar spine AROM lateral flexion to <60 cm Finger tip to floor to allow increase ease with bending forward to don shoes. [x]  []  []  []    Pt will report improved symptom centralization and absence of radicular symptoms for 3 consecutive days to improve function with ADLs. []  [x]  []  []    Pt will have decreased paraspinal mm tension to tolerate standing >90 minutes for work and home activities. []  [x]  []  []    Pt will demonstrate improved core strength to be able to perform modified dead bug with <3/10 pain. []  [x]  []  []    Pt will be independent and compliant with comprehensive HEP to maintain progress achieved in PT []  []  [x]  []                                                   Plan  Plan to continue PT for pain management and core stability with return to spinal mobility as tolerated to facilitate return to PLOF.    Treatment Last 4 Visits       1/27/2025 3/6/2025 3/11/2025 3/20/2025   PT Treatment   Treatment Day 5 6 7 8   Therapeutic Exercise Hip ADD ball squeeze 10\" 10x2  Hip ABD 10\" 10x2 isometric belt  Manual piriformis, hamstring, hip ADD, glut stretch 30 sec 3x RLE  SL SKTC 30 sec 3x RLE       Neuro Re-Ed Seated Foam roll push downs 15x 3\" hold with exhale  Seated EOB belt about thighs, hip ABD 15x 3\" hold       Manual Therapy SI MET for R anterior hamstring 10\" 10x at foot last 2 reps  SL L hip down, MET 5x 10\" hold   STM cross friction and effleurage R glut/ QL PSIS      Therapeutic Activity Back brace adjustment      Therapeutic Exercise Min 13      Neuro Re-Ed Min 15      Manual Therapy Min 10      Therapeutic Activity Min 3      Total of Timed Procedures 41      Total of Service Based 0      Total Treatment Time 41             1/27/2025 3/6/2025 3/11/2025 3/20/2025   Spine Treatment   Treatment Day 5 6 7 8   Therapeutic Exercise  Hip ADD ball squeeze 5\" 10x2   Hip ABD 5\" 10x2  isometric belt   Manual piriformis, hamstring, hip ADD, glut stretch 30 sec 3x RLE   SL SKTC 30 sec 3x RLE    Hip ADD ball squeeze 5\" 10x2 with TA brace   Hip ABD 5\" 10x2 isometric belt  with TA brace and breath support  Manual piriformis, hamstring, hip ADD, glut stretch 30 sec 3x RLE piriformis stretch LLE   SL SKTC 30 sec 3x RLE   Hip ADD foam roll squeeze 5\" 10x2 with TA brace   Hip ABD 5\" 10x2 isometric belt  with TA brace and breath support   Manual piriformis, hamstring, hip ADD, glut stretch 30 sec 3x RLE piriformis stretch LLE   SL SKTC 30 sec 3x RLE   SL hip flexor stretch 30 sec 3x RLE   Neuro Re-Education    Seated Foam roll push downs 15x 3\" hold with exhale  Seated EOB belt about thighs, hip ABD 15x 3\" hold     Manual Therapy  Isometric hamstring MET for R inomaintate, no change after 10 and 10 sec hold , another 10 reps after stretching improved LLD, then another 5 reps  STM SL R glut med/max gentle cross friction  Isometric hamstring MET for R inomaintate, 12x     STM SL R iliocostalis and QL effluerage  Isometric hamstring MET for R inomaintate, 12x        Therapeutic Exercise Minutes  15 24 20   Neuro Re-Educ Minutes    8   Manual Therapy Minutes  24 16 15   Total Time Of Timed Procedures 0 39 40 43   Total Time Of Service-Based Procedures 0 0 0 0   Total Treatment Time 0 39 40 43   HEP    Access Code: [email or web address]  URL: [email or web address]  Date: 01/14/2025  Prepared by: Julianne Miller     Exercises  - Supine 90/90 Abdominal Bracing  - 1 x daily - 7 x weekly - 3 sets - 10 reps - 5 sec hold             HEP  Access Code: [email or web address]  URL: [email or web address]  Date: 01/14/2025  Prepared by: Julianne Miller     Exercises  - Supine 90/90 Abdominal Bracing  - 1 x daily - 7 x weekly - 3 sets - 10 reps - 5 sec hold         Charges  1 manual 1 ther ex 1 neuro re ed           Oswestry Disability Index Score  Score: 22 % (3/20/2025 11:55 AM)    Post Oswestry Disability  Index Score  No data recorded  22 % improvement    Plan: Continue skilled Physical Therapy 1-2 x/week or a total of 5 additional visits over a 90 day period. Treatment will include: Manual Therapy; Therapeutic Exercises; Neuromuscular Re-education; Therapeutic Activity; Electrical Stim; Ultrasound; Pt education; Home exercise program instructions;        Patient/Family/Caregiver was advised of these findings, precautions, and treatment options and has agreed to actively participate in planning and for this course of care.    Thank you for your referral. If you have any questions, please contact me at Dept: [phone].    Sincerely,  Electronically signed by therapist: Ruthy Miller, PT, DPT    Physician's certification required:  Yes  Please co-sign or sign and return this letter via fax as soon as possible to [phone].   I certify the need for these services furnished under this plan of treatment and while under my care.    X___________________________________________________ Date____________________    Certification From: 3/20/2025  To:6/18/2025

## 2025-03-27 ENCOUNTER — APPOINTMENT (OUTPATIENT)
Dept: PHYSICAL THERAPY | Age: 70
End: 2025-03-27
Attending: INTERNAL MEDICINE
Payer: MEDICARE

## 2025-03-31 ENCOUNTER — TELEPHONE (OUTPATIENT)
Dept: FAMILY MEDICINE CLINIC | Facility: CLINIC | Age: 70
End: 2025-03-31

## 2025-03-31 RX ORDER — BENZONATATE 200 MG/1
200 CAPSULE ORAL 3 TIMES DAILY PRN
Qty: 30 CAPSULE | Refills: 0 | Status: SHIPPED | OUTPATIENT
Start: 2025-03-31 | End: 2025-04-10

## 2025-03-31 NOTE — TELEPHONE ENCOUNTER
No, they would not give you an antibiotic if you have symptoms for a day, we wait 7-10 days and then treat because by that time most viral sinusitis deni;l be getting better on there own.   I sent in the cough suppressant

## 2025-03-31 NOTE — TELEPHONE ENCOUNTER
Patient said last week she was at her son's house. She said they were painting in their house. She said she woke up with a nasty cough. She went to urgent care. She was diagnosed with sinusitis. They prescribed Tessalon Pearles. She was not prescribed an antibiotic. She said the cough is terrible and she has had a low grade temp around 99.5-100.5 since last Thursday. She is also taking Benadryl at night and is drinking plenty of fluids.  She uses [facility] in [place]. She is also almost out of TesseMedesenn Pearles.

## 2025-04-03 ENCOUNTER — OFFICE VISIT (OUTPATIENT)
Dept: FAMILY MEDICINE CLINIC | Facility: CLINIC | Age: 70
End: 2025-04-03
Payer: MEDICARE

## 2025-04-03 ENCOUNTER — OFFICE VISIT (OUTPATIENT)
Dept: PHYSICAL THERAPY | Age: 70
End: 2025-04-03
Attending: INTERNAL MEDICINE
Payer: MEDICARE

## 2025-04-03 ENCOUNTER — TELEPHONE (OUTPATIENT)
Dept: FAMILY MEDICINE CLINIC | Facility: CLINIC | Age: 70
End: 2025-04-03

## 2025-04-03 VITALS
TEMPERATURE: 99 F | DIASTOLIC BLOOD PRESSURE: 82 MMHG | SYSTOLIC BLOOD PRESSURE: 148 MMHG | HEART RATE: 74 BPM | OXYGEN SATURATION: 96 % | RESPIRATION RATE: 20 BRPM

## 2025-04-03 DIAGNOSIS — R06.02 SOB (SHORTNESS OF BREATH): ICD-10-CM

## 2025-04-03 DIAGNOSIS — R06.2 WHEEZING: Primary | ICD-10-CM

## 2025-04-03 PROBLEM — M79.641 PAIN IN RIGHT HAND: Status: ACTIVE | Noted: 2024-11-05

## 2025-04-03 PROBLEM — I10 HYPERTENSION: Status: ACTIVE | Noted: 2023-03-08

## 2025-04-03 PROBLEM — I25.10 ATHEROSCLEROSIS OF NATIVE CORONARY ARTERY OF NATIVE HEART WITHOUT ANGINA PECTORIS: Status: ACTIVE | Noted: 2024-02-29

## 2025-04-03 PROBLEM — I21.4 NSTEMI (NON-ST ELEVATED MYOCARDIAL INFARCTION) (HCC): Status: ACTIVE | Noted: 2024-02-18

## 2025-04-03 PROBLEM — G56.01 CARPAL TUNNEL SYNDROME OF RIGHT WRIST: Status: ACTIVE | Noted: 2024-11-05

## 2025-04-03 PROBLEM — M65.939 EXTENSOR TENOSYNOVITIS OF WRIST: Status: ACTIVE | Noted: 2024-10-31

## 2025-04-03 PROBLEM — G47.33 OSA ON CPAP: Status: ACTIVE | Noted: 2021-06-28

## 2025-04-03 PROCEDURE — 97110 THERAPEUTIC EXERCISES: CPT

## 2025-04-03 PROCEDURE — 94640 AIRWAY INHALATION TREATMENT: CPT

## 2025-04-03 PROCEDURE — 97140 MANUAL THERAPY 1/> REGIONS: CPT

## 2025-04-03 PROCEDURE — 99214 OFFICE O/P EST MOD 30 MIN: CPT

## 2025-04-03 RX ORDER — IPRATROPIUM BROMIDE AND ALBUTEROL SULFATE 2.5; .5 MG/3ML; MG/3ML
3 SOLUTION RESPIRATORY (INHALATION) ONCE
Status: COMPLETED | OUTPATIENT
Start: 2025-04-03 | End: 2025-04-03

## 2025-04-03 RX ORDER — IPRATROPIUM BROMIDE AND ALBUTEROL SULFATE 2.5; .5 MG/3ML; MG/3ML
3 SOLUTION RESPIRATORY (INHALATION) EVERY 12 HOURS
Qty: 60 ML | Refills: 0 | Status: SHIPPED | OUTPATIENT
Start: 2025-04-03 | End: 2025-04-13

## 2025-04-03 RX ORDER — PREDNISONE 20 MG/1
20 TABLET ORAL DAILY
Qty: 5 TABLET | Refills: 0 | Status: SHIPPED | OUTPATIENT
Start: 2025-04-03 | End: 2025-04-08

## 2025-04-03 RX ADMIN — IPRATROPIUM BROMIDE AND ALBUTEROL SULFATE 3 ML: 2.5; .5 SOLUTION RESPIRATORY (INHALATION) at 12:42:00

## 2025-04-03 NOTE — PROGRESS NOTES
Patient: Armida Suarez (69 year old, female) Referring Provider:  Insurance:   Diagnosis: Acute right-sided low back pain without sciatica (M54.50)  Imbalance (R26.89 Ashely Adriel  COMMERCIAL   Date of Surgery: 2/17/225 MRI Next MD visit:  MEDICARE   Precautions:  Fall Risk 4/9/25- Dr. Uribe Referral Information:    Date of Evaluation: Req: 0, Auth: 0, Exp:     01/10/25 POC Auth Visits:  15       Today's Date   4/3/2025    Subjective  Pt states she has been sick and did start medicine that has been helping. She states she is worn down.She states she no longe has SI pain, but pain central low back pain above sacrum. She feels like there is swelling or a discomfort there. She has not returned to treadmill walking yet. She reports she is finally not having pain in her back or her ribs from coughing. She can now lay on her back and maunever in bed without pain.       Pain: 2/10     Objective  active trigger R QL          Assessment  Pt able to tolerate supine/hook lying today for core challenge. Reduced irritability this visit and no mention of toe pain.    Goals (to be met in 16 visits)     Goals         Therapy Goals         Not Met Progress Toward Partially Met Met   Pt will improve transversus abdominis recruitment to perform proper isometric contraction without requiring verbal or tactile cuing to promote advancement of therex. []  [x]  []  []    Pt will demonstrate good understanding of proper posture and body mechanics to decrease pain and improve spinal safety. []  [x]  []  []    Pt will improve lumbar spine AROM lateral flexion to <60 cm Finger tip to floor to allow increase ease with bending forward to don shoes. [x]  []  []  []    Pt will report improved symptom centralization and absence of radicular symptoms for 3 consecutive days to improve function with ADLs. []  [x]  []  []    Pt will have decreased paraspinal mm tension to tolerate standing >90 minutes for work and home activities. []  [x]  []  []     Pt will demonstrate improved core strength to be able to perform modified dead bug with <3/10 pain. []  [x]  []  []    Pt will be independent and compliant with comprehensive HEP to maintain progress achieved in PT []  []  [x]  []                                                       Plan  Plan to continue PT for pain management and core stability with return to spinal mobility as tolerated to facilitate return to PLOF.    Treatment Last 4 Visits  Treatment Day: 9       3/6/2025 3/11/2025 3/20/2025 4/3/2025   Spine Treatment   Therapeutic Exercise Hip ADD ball squeeze 5\" 10x2   Hip ABD 5\" 10x2 isometric belt   Manual piriformis, hamstring, hip ADD, glut stretch 30 sec 3x RLE   SL SKTC 30 sec 3x RLE    Hip ADD ball squeeze 5\" 10x2 with TA brace   Hip ABD 5\" 10x2 isometric belt  with TA brace and breath support  Manual piriformis, hamstring, hip ADD, glut stretch 30 sec 3x RLE piriformis stretch LLE   SL SKTC 30 sec 3x RLE   Hip ADD foam roll squeeze 5\" 10x2 with TA brace   Hip ABD 5\" 10x2 isometric belt  with TA brace and breath support   Manual piriformis, hamstring, hip ADD, glut stretch 30 sec 3x RLE piriformis stretch LLE   SL SKTC 30 sec 3x RLE   SL hip flexor stretch 30 sec 3x RLE Hip ADD foam roll squeeze 5\" 10x2 with TA brace   Hip ABD 5\" 10x2 isometric belt  with TA brace and breath support   Manual piriformis, hamstring, hip ADD, glut stretch 30 sec 3x RLE piriformis stretch LLE   SL SKTC 30 sec 3x RLE   SL hip flexor stretch 30 sec 3x RLE     Hook lying hip ABD green abnd 10x3   Neuro Re-Education   Seated Foam roll push downs 15x 3\" hold with exhale  Seated EOB belt about thighs, hip ABD 15x 3\" hold   SL Clams 15x  Hook lying Foam roll push downs 15x 3\" hold with exhale       Manual Therapy Isometric hamstring MET for R inomaintate, no change after 10 and 10 sec hold , another 10 reps after stretching improved LLD, then another 5 reps  STM SL R glut med/max gentle cross friction  Isometric hamstring MET  for R inomaintate, 12x     STM SL R iliocostalis and QL effluerage  Isometric hamstring MET for R inomaintate, 12x      STM SL R iliocostalis and QL effluerage      Therapeutic Exercise Minutes 15 24 20 24   Neuro Re-Educ Minutes   8 8   Manual Therapy Minutes 24 16 15 10   Total Time Of Timed Procedures 39 40 43 42   Total Time Of Service-Based Procedures 0 0 0 0   Total Treatment Time 39 40 43 42   HEP   Access Code: [email or web address]  URL: [email or web address]  Date: 01/14/2025  Prepared by: Julianne Miller     Exercises  - Supine 90/90 Abdominal Bracing  - 1 x daily - 7 x weekly - 3 sets - 10 reps - 5 sec hold      Access Code: [email or web address]   URL: [email or web address]   Date: 01/14/2025   Prepared by: Julianne Miller      Exercises   - Supine 90/90 Abdominal Bracing  - 1 x daily - 7 x weekly - 3 sets - 10 reps - 5 sec hold           HEP  Access Code: [email or web address]   URL: [email or web address]   Date: 01/14/2025   Prepared by: Julianne Miller      Exercises   - Supine 90/90 Abdominal Bracing  - 1 x daily - 7 x weekly - 3 sets - 10 reps - 5 sec hold       Charges  2 ther ex 1 manual

## 2025-04-03 NOTE — TELEPHONE ENCOUNTER
Patient states she is short of breath on exertion, she has been feeling like she has a \"squeak\" on expiration. Vitals obtained, Patient assessed by Yarely SONG. Nebulizer initiated.  
Patient was downstairs at physical therapy & they noticed patient wheezing.  She came upstairs from appointment.   
Laceration of finger, left

## 2025-04-03 NOTE — PROGRESS NOTES
Armida Suarez is a 69 year old female.  HPI:     Patient in office for recent wheezing.  She was in physical therapy and therapist sent her upstairs to be seen due to her wheezing.      She was seen at [facility] urgent care one week ago for cough, nasal congestion and short of breath.  She was diagnosed with flu B and prescribed tessalon pearls for cough. She reports the tessalon pearls have been helpful.    Current Outpatient Medications   Medication Sig Dispense Refill    benzonatate 200 MG Oral Cap Take 1 capsule (200 mg total) by mouth 3 (three) times daily as needed. 30 capsule 0    LOSARTAN 25 MG Oral Tab TAKE ONE TABLET BY MOUTH DAILY 90 tablet 0    diazePAM 5 MG Oral Tab Take 1 tablet (5 mg total) by mouth.      lidocaine 5 % External Patch Place 1 patch onto the skin daily.      cyclobenzaprine 5 MG Oral Tab Take 1 tablet (5 mg total) by mouth 2 (two) times daily as needed for Muscle spasms.      Cholecalciferol (VITAMIN D-3) 25 MCG (1000 UT) Oral Cap       aspirin 81 MG Oral Tab EC Take 1 tablet (81 mg total) by mouth daily.      atorvastatin 80 MG Oral Tab Take 1 tablet (80 mg total) by mouth nightly.      carvedilol 3.125 MG Oral Tab Take 1 tablet (3.125 mg total) by mouth 2 (two) times daily with meals.      Coenzyme Q10 (COQ10) 100 MG Oral Cap       estradiol 0.1 MG/GM Vaginal Cream APPLY A PEA SIZED AMOUNT OF CREAM PER VAGINA TWICE WEEKLY      Milk Thistle 1000 MG Oral Cap       Ascorbic Acid (VITAMIN C OR) Take 1,000 mg by mouth 2 (two) times daily.      omega-3 fatty acids 1000 MG Oral Cap Take 1,000 mg by mouth daily.      Lactobacillus (PROBIOTIC ACIDOPHILUS OR) Take 2 tablets by mouth 2 (two) times daily.        Past Medical History:    Abdominal pain, unspecified site    Acute sinusitis, unspecified    Allergic rhinitis, cause unspecified    Bronchitis, not specified as acute or chronic    Disequilibrium    Dizziness and giddiness    04/09/2012    Fuchs' corneal dystrophy    Influenza with other  respiratory manifestations    Other psoriasis    Other urinary incontinence    Pure hypercholesterolemia    Sprain of neck      Social History:  Social History     Socioeconomic History    Marital status: OTHER   Tobacco Use    Smoking status: Never    Smokeless tobacco: Never    Tobacco comments:     Current Non smoker    Vaping Use    Vaping status: Never Used   Substance and Sexual Activity    Alcohol use: No     Alcohol/week: 0.0 standard drinks of alcohol    Drug use: No    Sexual activity: Not Currently   Other Topics Concern    Exercise Yes     Comment: 4 x week      Social Drivers of Health     Food Insecurity: No Food Insecurity (1/23/2025)    NCSS - Food Insecurity     Worried About Running Out of Food in the Last Year: No     Ran Out of Food in the Last Year: No   Transportation Needs: No Transportation Needs (1/23/2025)    NCSS - Transportation     Lack of Transportation: No   Housing Stability: Not At Risk (1/23/2025)    NCSS - Housing/Utilities     Has Housing: Yes     Worried About Losing Housing: No     Unable to Get Utilities: No          REVIEW OF SYSTEMS:     Review of Systems   Constitutional:  Positive for fatigue. Negative for activity change and appetite change.   HENT:  Positive for congestion. Negative for hearing loss and sore throat.    Eyes:  Negative for discharge and redness.   Respiratory:  Positive for shortness of breath and wheezing.    Cardiovascular:  Negative for chest pain.   Gastrointestinal:  Negative for abdominal distention and abdominal pain.   Endocrine: Negative for cold intolerance and heat intolerance.   Genitourinary:  Negative for difficulty urinating and urgency.   Musculoskeletal:  Negative for arthralgias and back pain.   Skin:  Negative for color change.   Allergic/Immunologic: Negative for environmental allergies.   Neurological:  Negative for dizziness, syncope and headaches.   Hematological:  Negative for adenopathy. Does not bruise/bleed easily.    Psychiatric/Behavioral:  Negative for agitation, behavioral problems and confusion.        EXAM:   /82 (BP Location: Left arm)   Pulse 74   Temp 98.8 °F (37.1 °C)   Resp 20   SpO2 96%     Physical Exam  Constitutional:       Appearance: She is well-developed.   HENT:      Head: Normocephalic.      Right Ear: Tympanic membrane normal.      Left Ear: Tympanic membrane normal.      Nose: Congestion and rhinorrhea present.      Mouth/Throat:      Mouth: Mucous membranes are moist.   Eyes:      Pupils: Pupils are equal, round, and reactive to light.   Cardiovascular:      Rate and Rhythm: Normal rate and regular rhythm.      Pulses: Normal pulses.      Heart sounds: Normal heart sounds.   Pulmonary:      Effort: Pulmonary effort is normal.      Breath sounds: Wheezing and rhonchi present.   Lymphadenopathy:      Cervical: Cervical adenopathy present.   Skin:     General: Skin is warm and dry.   Neurological:      Mental Status: She is alert and oriented to person, place, and time.      Deep Tendon Reflexes: Reflexes are normal and symmetric.          ASSESSMENT AND PLAN:     1. SOB (shortness of breath)  Duoneb done in office and improvement to lung sounds noted.  Prednisone and duoneb sent to pharmacy and instructions provided  - predniSONE 20 MG Oral Tab; Take 1 tablet (20 mg total) by mouth daily for 5 days.  Dispense: 5 tablet; Refill: 0  - ipratropium-albuterol 0.5-2.5 (3) MG/3ML Inhalation Solution; Take 3 mL by nebulization every 12 (twelve) hours for 10 days.  Dispense: 60 mL; Refill: 0  - ipratropium-albuterol (Duoneb) 0.5-2.5 (3) MG/3ML inhalation solution 3 mL  - ipratropium-albuterol 0.5-2.5 (3) MG/3ML Inhalation Solution; Take 3 mL by nebulization every 12 (twelve) hours for 10 days.  Dispense: 60 mL; Refill: 0    2. Wheezing  Duoneb done in office and improvement to lung sounds noted.  Prednisone and duoneb sent to pharmacy and instructions provided  - predniSONE 20 MG Oral Tab; Take 1 tablet  (20 mg total) by mouth daily for 5 days.  Dispense: 5 tablet; Refill: 0  - ipratropium-albuterol 0.5-2.5 (3) MG/3ML Inhalation Solution; Take 3 mL by nebulization every 12 (twelve) hours for 10 days.  Dispense: 60 mL; Refill: 0  - ipratropium-albuterol (Duoneb) 0.5-2.5 (3) MG/3ML inhalation solution 3 mL  - ipratropium-albuterol 0.5-2.5 (3) MG/3ML Inhalation Solution; Take 3 mL by nebulization every 12 (twelve) hours for 10 days.  Dispense: 60 mL; Refill: 0        The patient indicates understanding of these issues and agrees to the plan.  The patient is asked to return in 3 to 5 days if symptoms are not improving.    Yarely Doe, APRN  4/3/2025

## 2025-04-09 ENCOUNTER — OFFICE VISIT (OUTPATIENT)
Dept: PHYSICAL MEDICINE AND REHAB | Facility: CLINIC | Age: 70
End: 2025-04-09
Payer: MEDICARE

## 2025-04-09 VITALS — BODY MASS INDEX: 32 KG/M2 | WEIGHT: 210 LBS

## 2025-04-09 DIAGNOSIS — M53.3 SACROILIAC JOINT DYSFUNCTION: Primary | ICD-10-CM

## 2025-04-09 PROCEDURE — 99204 OFFICE O/P NEW MOD 45 MIN: CPT | Performed by: PHYSICAL MEDICINE & REHABILITATION
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
## 2025-04-21 ENCOUNTER — OFFICE VISIT (OUTPATIENT)
Dept: FAMILY MEDICINE CLINIC | Facility: CLINIC | Age: 70
End: 2025-04-21
Payer: MEDICARE

## 2025-04-21 VITALS
HEART RATE: 85 BPM | TEMPERATURE: 98 F | OXYGEN SATURATION: 96 % | DIASTOLIC BLOOD PRESSURE: 66 MMHG | RESPIRATION RATE: 18 BRPM | SYSTOLIC BLOOD PRESSURE: 128 MMHG

## 2025-04-21 DIAGNOSIS — R05.8 ALLERGIC COUGH: Primary | ICD-10-CM

## 2025-04-21 DIAGNOSIS — J30.2 SEASONAL ALLERGIES: ICD-10-CM

## 2025-04-21 PROCEDURE — 99214 OFFICE O/P EST MOD 30 MIN: CPT | Performed by: INTERNAL MEDICINE

## 2025-04-21 RX ORDER — ALBUTEROL SULFATE 90 UG/1
2 INHALANT RESPIRATORY (INHALATION) EVERY 6 HOURS PRN
Qty: 1 EACH | Refills: 3 | Status: SHIPPED | OUTPATIENT
Start: 2025-04-21 | End: 2025-05-21

## 2025-04-21 NOTE — PROGRESS NOTES
HPI:   Armida Suarez is a 69 year old female who presents for upper respiratory symptoms for  2  days. Patient reports congestion, dry cough, takes allegra everyday .    Current Medications[1]   Past Medical History[2]   Past Surgical History[3]   Family History[4]   Short Social Hx on File[5]      REVIEW OF SYSTEMS:   GENERAL: feels well otherwise  SKIN: no rashes  EYES:denies blurred vision or double vision  HEENT: congested  LUNGS: denies shortness of breath with exertion  CARDIOVASCULAR: denies chest pain on exertion  GI: no nausea or abdominal pain  NEURO: denies headaches    EXAM:   /66   Pulse 85   Temp 98.3 °F (36.8 °C) (Temporal)   Resp 18   SpO2 96%   GENERAL: well developed, well nourished,in no apparent distress  SKIN: no rashes,no suspicious lesions  EYES:PERRLA, EOMI, normal optic disk,conjunctiva are clear  HEENT: atraumatic, normocephalic,ears and throat are clear  NECK: supple,no adenopathy,no bruits  LUNGS: clear to auscultation  CARDIO: RRR without murmur  GI: good BS's,no masses, HSM or tenderness    ASSESSMENT AND PLAN:   Armida Suarez is a 69 year old female who presents with  allergic cough . PLAN:  OTC Zyrtec and Nasocort,  refilled albuterol for lung symptoms,  .  The patient indicates understanding of these issues and agrees to the plan.  The patient is asked to return if sx's persist or worsen.         [1]   Current Outpatient Medications   Medication Sig Dispense Refill    Calcium-Magnesium-Vitamin D (CALCIUM MAGNESIUM OR) Take 1 tablet by mouth nightly.      FEXOFENADINE HCL OR Take 1 tablet by mouth every morning.      LOSARTAN 25 MG Oral Tab TAKE ONE TABLET BY MOUTH DAILY 90 tablet 0    lidocaine 5 % External Patch Place 1 patch onto the skin daily. (Patient not taking: Reported on 4/3/2025)      Cholecalciferol (VITAMIN D-3) 25 MCG (1000 UT) Oral Cap  (Patient not taking: Reported on 4/3/2025)      aspirin 81 MG Oral Tab EC Take 1 tablet (81 mg total) by mouth daily.       atorvastatin 80 MG Oral Tab Take 1 tablet (80 mg total) by mouth nightly.      carvedilol 3.125 MG Oral Tab Take 1 tablet (3.125 mg total) by mouth 2 (two) times daily with meals.      Coenzyme Q10 (COQ10) 100 MG Oral Cap       estradiol 0.1 MG/GM Vaginal Cream APPLY A PEA SIZED AMOUNT OF CREAM PER VAGINA TWICE WEEKLY      Milk Thistle 1000 MG Oral Cap       Ascorbic Acid (VITAMIN C OR) Take 1,000 mg by mouth 2 (two) times daily.      omega-3 fatty acids 1000 MG Oral Cap Take 1,000 mg by mouth daily.      Lactobacillus (PROBIOTIC ACIDOPHILUS OR) Take 2 tablets by mouth 2 (two) times daily.     [2]   Past Medical History:   Abdominal pain, unspecified site    Acute sinusitis, unspecified    Allergic rhinitis, cause unspecified    Bronchitis, not specified as acute or chronic    Disequilibrium    Dizziness and giddiness    04/09/2012    Essential hypertension    Fuchs' corneal dystrophy    Hyperlipidemia    Influenza with other respiratory manifestations    Myocardial infarction (HCC)    Other psoriasis    Other urinary incontinence    Pure hypercholesterolemia    Sprain of neck   [3]   Past Surgical History:  Procedure Laterality Date    Hysterectomy      Complete (no cancer)     Oophorectomy     [4]   Family History  Problem Relation Age of Onset    Other (Other) Mother         MVA    Breast Cancer Maternal Aunt 60    Breast Cancer Maternal Aunt 50    Pulmonary Disease Other         Grandmother with PUD     Cancer Other         Lung cancer    Breast Cancer Cousin 50   [5]   Social History  Socioeconomic History    Marital status: OTHER   Tobacco Use    Smoking status: Never    Smokeless tobacco: Never    Tobacco comments:     Current Non smoker    Vaping Use    Vaping status: Never Used   Substance and Sexual Activity    Alcohol use: Never    Drug use: No    Sexual activity: Not Currently   Other Topics Concern    Exercise Yes     Comment: 4 x week      Social Drivers of Health     Food Insecurity: No Food  Insecurity (1/23/2025)    NCSS - Food Insecurity     Worried About Running Out of Food in the Last Year: No     Ran Out of Food in the Last Year: No   Transportation Needs: No Transportation Needs (1/23/2025)    NCSS - Transportation     Lack of Transportation: No   Housing Stability: Not At Risk (1/23/2025)    NCSS - Housing/Utilities     Has Housing: Yes     Worried About Losing Housing: No     Unable to Get Utilities: No

## 2025-04-24 ENCOUNTER — OFFICE VISIT (OUTPATIENT)
Dept: PHYSICAL THERAPY | Age: 70
End: 2025-04-24
Attending: INTERNAL MEDICINE
Payer: MEDICARE

## 2025-04-24 PROCEDURE — 97112 NEUROMUSCULAR REEDUCATION: CPT

## 2025-04-24 PROCEDURE — 97140 MANUAL THERAPY 1/> REGIONS: CPT

## 2025-04-24 PROCEDURE — 97110 THERAPEUTIC EXERCISES: CPT

## 2025-04-24 NOTE — PROGRESS NOTES
Patient: Armida Suarez (69 year old, female) Referring Provider:  Insurance:   Diagnosis: Acute right-sided low back pain without sciatica (M54.50)  Imbalance (R26.89 Ashely Adriel  COMMERCIAL   Date of Surgery: 2/17/225 MRI Next MD visit:  MEDICARE   Precautions:  Fall Risk 4/9/25- Dr. Uribe Referral Information:    Date of Evaluation: Req: 0, Auth: 0, Exp:     01/10/25 POC Auth Visits:  15       Today's Date   4/24/2025    Subjective  Pt states she is still coughing, but feeling a little better. She has now officially retied and is hoping the stress relief helps her back. Saw Dr. Rebollar and he feels pt is healing on her her own, continue PT, no ESIs recommended. She states she has begun anti-inflammatory shakes and hopes those helps. SHe notes pressure still in SI, MD feels that is inflammation in  SI. She states she was able to watch her great grandson and walked about 8000 steps with moderate symptoms and spasm. She co tightness in low back. She notices that if she braces core when walking, she can walk nearly pain free.       Pain: 3/10     Objective  +R quadrant replicated spasm up R side, LLD- symmetrical malleoli            Assessment  Pt returns with stable and symmetrical SI. Reinforced core stabilization exercises and gentle stretching.    Goals (to be met in 16 visits)       Goals         Therapy Goals         Not Met Progress Toward Partially Met Met   Pt will improve transversus abdominis recruitment to perform proper isometric contraction without requiring verbal or tactile cuing to promote advancement of therex. []  [x]  []  []    Pt will demonstrate good understanding of proper posture and body mechanics to decrease pain and improve spinal safety. []  [x]  []  []    Pt will improve lumbar spine AROM lateral flexion to <60 cm Finger tip to floor to allow increase ease with bending forward to don shoes. [x]  []  []  []    Pt will report improved symptom centralization and absence of radicular  symptoms for 3 consecutive days to improve function with ADLs. []  [x]  []  []    Pt will have decreased paraspinal mm tension to tolerate standing >90 minutes for work and home activities. []  [x]  []  []    Pt will demonstrate improved core strength to be able to perform modified dead bug with <3/10 pain. []  [x]  []  []    Pt will be independent and compliant with comprehensive HEP to maintain progress achieved in PT []  []  [x]  []                                                           Plan  Plan to continue PT for pain management and core stability with return to spinal mobility as tolerated to facilitate return to PLOF.    Treatment Last 4 Visits  Treatment Day: 10       3/11/2025 3/20/2025 4/3/2025 4/24/2025   Spine Treatment   Therapeutic Exercise Hip ADD ball squeeze 5\" 10x2 with TA brace   Hip ABD 5\" 10x2 isometric belt  with TA brace and breath support  Manual piriformis, hamstring, hip ADD, glut stretch 30 sec 3x RLE piriformis stretch LLE   SL SKTC 30 sec 3x RLE   Hip ADD foam roll squeeze 5\" 10x2 with TA brace   Hip ABD 5\" 10x2 isometric belt  with TA brace and breath support   Manual piriformis, hamstring, hip ADD, glut stretch 30 sec 3x RLE piriformis stretch LLE   SL SKTC 30 sec 3x RLE   SL hip flexor stretch 30 sec 3x RLE Hip ADD foam roll squeeze 5\" 10x2 with TA brace   Hip ABD 5\" 10x2 isometric belt  with TA brace and breath support   Manual piriformis, hamstring, hip ADD, glut stretch 30 sec 3x RLE piriformis stretch LLE   SL SKTC 30 sec 3x RLE   SL hip flexor stretch 30 sec 3x RLE     Hook lying hip ABD green abnd 10x3 Hip ADD foam roll squeeze 5\" 15x2 with TA brace   Hip ABD 5\" 10x2 isometric belt  with TA brace and breath support   Manual piriformis, hamstring, hip ADD, glut stretch 30 sec 3x RLE piriformis stretch LLE   SL SKTC 30 sec 3x RLE   SL hip flexor stretch 30 sec 3x RLE   Hook lying hip ABD green band 10x3      Neuro Re-Education  Seated Foam roll push downs 15x 3\" hold with  exhale  Seated EOB belt about thighs, hip ABD 15x 3\" hold   SL Clams 15x  Hook lying Foam roll push downs 15x 3\" hold with exhale     SL Clams 15x2  Hook lying Foam roll push downs 15x 3\" hold with exhale     BKFO green band 5x2 each LE   Manual Therapy STM SL R glut med/max gentle cross friction  Isometric hamstring MET for R inomaintate, 12x     STM SL R iliocostalis and QL effluerage  Isometric hamstring MET for R inomaintate, 12x      STM SL R iliocostalis and QL effluerage    STM SL R iliocostalis and QL effluerage   Therapeutic Exercise Minutes 24 20 24 10   Neuro Re-Educ Minutes  8 8 18   Manual Therapy Minutes 16 15 10 16   Total Time Of Timed Procedures 40 43 42 44   Total Time Of Service-Based Procedures 0 0 0 0   Total Treatment Time 40 43 42 44   HEP  Access Code: [email or web address]  URL: [email or web address]  Date: 01/14/2025  Prepared by: Julianne Miller     Exercises  - Supine 90/90 Abdominal Bracing  - 1 x daily - 7 x weekly - 3 sets - 10 reps - 5 sec hold      Access Code: [email or web address]   URL: [email or web address]   Date: 01/14/2025   Prepared by: Julianne Miller      Exercises   - Supine 90/90 Abdominal Bracing  - 1 x daily - 7 x weekly - 3 sets - 10 reps - 5 sec hold    Access Code: [email or web address]  URL: [email or web address]://[email or web address]  Date: 04/24/2025  Prepared by: Julianne Miller    Exercises  - Supine 90/90 Abdominal Bracing  - 1 x daily - 7 x weekly - 3 sets - 10 reps - 5 sec hold  - Hooklying Single Leg Bent Knee Fallouts with Resistance  - 1 x daily - 7 x weekly - 3 sets - 10 reps  - Supine Hip Adduction Isometric with Ball  - 1 x daily - 7 x weekly - 2 sets - 15 reps - 5 sec hold  - Abdominal Press into Ball  - 1 x daily - 7 x weekly - 2 sets - 15 reps  - Supine Piriformis Stretch with Leg Straight  - 3 x daily - 7 x weekly - 3 sets - 3 reps - 30 sec hold  - Supine Single Knee to Chest Stretch  - 3 x daily - 7 x weekly - 3 sets - 3 reps - 30 hold         HEP  Access Code: [email or web address]  URL: [email or web address]  Date: 04/24/2025  Prepared by: Julianne Miller    Exercises  - Supine 90/90 Abdominal Bracing  - 1 x daily - 7 x weekly - 3 sets - 10 reps - 5 sec hold  - Hooklying Single Leg Bent Knee Fallouts with Resistance  - 1 x daily - 7 x weekly - 3 sets - 10 reps  - Supine Hip Adduction Isometric with Ball  - 1 x daily - 7 x weekly - 2 sets - 15 reps - 5 sec hold  - Abdominal Press into Ball  - 1 x daily - 7 x weekly - 2 sets - 15 reps  - Supine Piriformis Stretch with Leg Straight  - 3 x daily - 7 x weekly - 3 sets - 3 reps - 30 sec hold  - Supine Single Knee to Chest Stretch  - 3 x daily - 7 x weekly - 3 sets - 3 reps - 30 hold    Charges   1 manual 1 ther ex 1 neuro re ed

## 2025-04-26 RX ORDER — LOSARTAN POTASSIUM 25 MG/1
25 TABLET ORAL DAILY
Qty: 90 TABLET | Refills: 0 | Status: SHIPPED | OUTPATIENT
Start: 2025-04-26

## 2025-04-26 NOTE — TELEPHONE ENCOUNTER
Last refill: 01/25/25  qtY: 90  W/  0 refills  Last ov: 04/21/25    Requested Prescriptions     Pending Prescriptions Disp Refills    LOSARTAN 25 MG Oral Tab [Pharmacy Med Name: Losartan Potassium 25 Mg Tab [facility]] 90 tablet 0     Sig: TAKE ONE TABLET BY MOUTH ONCE DAILY     Future Appointments   Date Time Provider Department Center   5/1/2025  2:00 PM Ruthy Miller PT [facility] [place]   5/8/2025  1:15 PM Ruthy Miller PT [facility] [place]   5/22/2025  1:15 PM Ruthy Miller PT [facility] [place]   5/29/2025  9:15 AM Ruthy Miller PT [facility] [place]

## 2025-05-01 ENCOUNTER — OFFICE VISIT (OUTPATIENT)
Dept: PHYSICAL THERAPY | Age: 70
End: 2025-05-01
Attending: INTERNAL MEDICINE
Payer: MEDICARE

## 2025-05-01 PROCEDURE — 97110 THERAPEUTIC EXERCISES: CPT

## 2025-05-01 PROCEDURE — 97140 MANUAL THERAPY 1/> REGIONS: CPT

## 2025-05-01 NOTE — PROGRESS NOTES
Patient: Armida Suarez (69 year old, female) Referring Provider:  Insurance:   Diagnosis: Acute right-sided low back pain without sciatica (M54.50)  Imbalance (R26.89 Ashely Adriel  COMMERCIAL   Date of Surgery: No data recorded Next MD visit:  MEDICARE   Precautions:  Fall Risk No data recorded Referral Information:    Date of Evaluation: Req: 0, Auth: 0, Exp:     No data recorded POC Auth Visits:  16       Today's Date   5/1/2025    Subjective  Pt states her R hip locked up earlier but she was able to stretch and got her SI to pop and now feels relief.       Pain: 2/10     Objective  Pt 15 mins late, TTP and restricted R piriformisand Iliocostatlis T10-L1 but improved extensibility compared to last visit.            Assessment  Improved pain and SI stability this visit, focus on core stabilization to maintain aligment and mm length.    Goals (to be met in  )       Goals         Therapy Goals         Not Met Progress Toward Partially Met Met   Pt will improve transversus abdominis recruitment to perform proper isometric contraction without requiring verbal or tactile cuing to promote advancement of therex. []  [x]  []  []    Pt will demonstrate good understanding of proper posture and body mechanics to decrease pain and improve spinal safety. []  [x]  []  []    Pt will improve lumbar spine AROM lateral flexion to <60 cm Finger tip to floor to allow increase ease with bending forward to don shoes. [x]  []  []  []    Pt will report improved symptom centralization and absence of radicular symptoms for 3 consecutive days to improve function with ADLs. []  [x]  []  []    Pt will have decreased paraspinal mm tension to tolerate standing >90 minutes for work and home activities. []  [x]  []  []    Pt will demonstrate improved core strength to be able to perform modified dead bug with <3/10 pain. []  [x]  []  []    Pt will be independent and compliant with comprehensive HEP to maintain progress achieved in PT []  []   [x]  []                                                               Plan  Plan to continue PT for pain management and core stability with return to spinal mobility as tolerated to facilitate return to PLOF.    Treatment Last 4 Visits  Treatment Day: 11       3/20/2025 4/3/2025 4/24/2025 5/1/2025   Spine Treatment   Therapeutic Exercise Hip ADD foam roll squeeze 5\" 10x2 with TA brace   Hip ABD 5\" 10x2 isometric belt  with TA brace and breath support   Manual piriformis, hamstring, hip ADD, glut stretch 30 sec 3x RLE piriformis stretch LLE   SL SKTC 30 sec 3x RLE   SL hip flexor stretch 30 sec 3x RLE Hip ADD foam roll squeeze 5\" 10x2 with TA brace   Hip ABD 5\" 10x2 isometric belt  with TA brace and breath support   Manual piriformis, hamstring, hip ADD, glut stretch 30 sec 3x RLE piriformis stretch LLE   SL SKTC 30 sec 3x RLE   SL hip flexor stretch 30 sec 3x RLE     Hook lying hip ABD green abnd 10x3 Hip ADD foam roll squeeze 5\" 15x2 with TA brace   Hip ABD 5\" 10x2 isometric belt  with TA brace and breath support   Manual piriformis, hamstring, hip ADD, glut stretch 30 sec 3x RLE piriformis stretch LLE   SL SKTC 30 sec 3x RLE   SL hip flexor stretch 30 sec 3x RLE   Hook lying hip ABD green band 10x3    Hip ADD foam roll squeeze 5\" 15x2 with TA brace   Hip ABD 5\" 10x2 isometric belt  with TA brace and breath support   Manual piriformis, hamstring, hip ADD, glut stretch 30 sec 3x RLE piriformis stretch LLE   SL SKTC 30 sec 3x RLE   SL hip flexor stretch 30 sec 3x RLE   Hook lying hip ABD green band 10x3      Neuro Re-Education Seated Foam roll push downs 15x 3\" hold with exhale  Seated EOB belt about thighs, hip ABD 15x 3\" hold   SL Clams 15x  Hook lying Foam roll push downs 15x 3\" hold with exhale     SL Clams 15x2  Hook lying Foam roll push downs 15x 3\" hold with exhale     BKFO green band 5x2 each LE Hook lying Foam roll push downs 15x 3\" hold with exhale        Manual Therapy STM SL R iliocostalis and QL  effluerage  Isometric hamstring MET for R inomaintate, 12x      STM SL R iliocostalis and QL effluerage    STM SL R iliocostalis and QL effluerage STM SL R piriformis deep pressure to effleurage paraspinal TP T10-L1   Therapeutic Exercise Minutes 20 24 10 15   Neuro Re-Educ Minutes 8 8 18    Manual Therapy Minutes 15 10 16 15   Total Time Of Timed Procedures 43 42 44 30   Total Time Of Service-Based Procedures 0 0 0 0   Total Treatment Time 43 42 44 30   HEP Access Code: [email or web address]  URL: [email or web address]  Date: 01/14/2025  Prepared by: Julianne Whitaker Paul     Exercises  - Supine 90/90 Abdominal Bracing  - 1 x daily - 7 x weekly - 3 sets - 10 reps - 5 sec hold      Access Code: [email or web address]   URL: [email or web address]://[email or web address]/   Date: 01/14/2025   Prepared by: Julianne Whitaker Paul      Exercises   - Supine 90/90 Abdominal Bracing  - 1 x daily - 7 x weekly - 3 sets - 10 reps - 5 sec hold    Access Code: [email or web address]  URL: [email or web address]://[email or web address]/  Date: 04/24/2025  Prepared by: Julianne Whitaker Paul    Exercises  - Supine 90/90 Abdominal Bracing  - 1 x daily - 7 x weekly - 3 sets - 10 reps - 5 sec hold  - Hooklying Single Leg Bent Knee Fallouts with Resistance  - 1 x daily - 7 x weekly - 3 sets - 10 reps  - Supine Hip Adduction Isometric with Ball  - 1 x daily - 7 x weekly - 2 sets - 15 reps - 5 sec hold  - Abdominal Press into Ball  - 1 x daily - 7 x weekly - 2 sets - 15 reps  - Supine Piriformis Stretch with Leg Straight  - 3 x daily - 7 x weekly - 3 sets - 3 reps - 30 sec hold  - Supine Single Knee to Chest Stretch  - 3 x daily - 7 x weekly - 3 sets - 3 reps - 30 hold   Access Code: [email or web address]  URL: https://[email or web address]/  Date: 04/24/2025  Prepared by: Julianne Whitaker Paul     Exercises  - Supine 90/90 Abdominal Bracing  - 1 x daily - 7 x weekly - 3 sets - 10 reps - 5 sec hold  - Hooklying Single Leg Bent Knee Fallouts with Resistance  - 1 x daily - 7 x weekly - 3 sets - 10  reps  - Supine Hip Adduction Isometric with Ball  - 1 x daily - 7 x weekly - 2 sets - 15 reps - 5 sec hold  - Abdominal Press into Ball  - 1 x daily - 7 x weekly - 2 sets - 15 reps  - Supine Piriformis Stretch with Leg Straight  - 3 x daily - 7 x weekly - 3 sets - 3 reps - 30 sec hold  - Supine Single Knee to Chest Stretch  - 3 x daily - 7 x weekly - 3 sets - 3 reps - 30 hold            HEP    Access Code: [email or web address]  URL: [email or web address]  Date: 04/24/2025  Prepared by: Julianne Miller     Exercises  - Supine 90/90 Abdominal Bracing  - 1 x daily - 7 x weekly - 3 sets - 10 reps - 5 sec hold  - Hooklying Single Leg Bent Knee Fallouts with Resistance  - 1 x daily - 7 x weekly - 3 sets - 10 reps  - Supine Hip Adduction Isometric with Ball  - 1 x daily - 7 x weekly - 2 sets - 15 reps - 5 sec hold  - Abdominal Press into Ball  - 1 x daily - 7 x weekly - 2 sets - 15 reps  - Supine Piriformis Stretch with Leg Straight  - 3 x daily - 7 x weekly - 3 sets - 3 reps - 30 sec hold  - Supine Single Knee to Chest Stretch  - 3 x daily - 7 x weekly - 3 sets - 3 reps - 30 hold        Charges  1 manual 1 ther ex

## 2025-05-08 ENCOUNTER — OFFICE VISIT (OUTPATIENT)
Dept: PHYSICAL THERAPY | Age: 70
End: 2025-05-08
Attending: INTERNAL MEDICINE
Payer: MEDICARE

## 2025-05-08 PROCEDURE — 97112 NEUROMUSCULAR REEDUCATION: CPT

## 2025-05-08 PROCEDURE — 97110 THERAPEUTIC EXERCISES: CPT

## 2025-05-08 PROCEDURE — 97140 MANUAL THERAPY 1/> REGIONS: CPT

## 2025-05-08 NOTE — PROGRESS NOTES
Patient: Armida Suarez (69 year old, female) Referring Provider:  Insurance:   Diagnosis: Acute right-sided low back pain without sciatica (M54.50)  Imbalance (R26.89 Ashely Morel  COMMERCIAL   Date of Surgery: No data recorded Next MD visit:  MEDICARE   Precautions:  Fall Risk No data recorded Referral Information:    Date of Evaluation: Req: 0, Auth: 0, Exp:     No data recorded POC Auth Visits:  16       Today's Date   5/8/2025    Subjective  Pt states she got locked out of her home nad sat and waited for about 1.5 hours and then after getting out of her car, stood and felt her back grab R side. She has been doing active PPT to alleviate it. SHe states she got a migraine Fridaty, then Sunday and Monday. Reports waterfall effect visually and L side of head and face pain.       Pain: 4/10     Objective               Assessment  Irritability in glut and paraspinals R side this visit, but overall increased symmetry in pelvis noted and reported. focus on core activation without back pain increase.    Goals (to be met in  )       Goals         Therapy Goals         Not Met Progress Toward Partially Met Met   Pt will improve transversus abdominis recruitment to perform proper isometric contraction without requiring verbal or tactile cuing to promote advancement of therex. []  [x]  []  []    Pt will demonstrate good understanding of proper posture and body mechanics to decrease pain and improve spinal safety. []  [x]  []  []    Pt will improve lumbar spine AROM lateral flexion to <60 cm Finger tip to floor to allow increase ease with bending forward to don shoes. [x]  []  []  []    Pt will report improved symptom centralization and absence of radicular symptoms for 3 consecutive days to improve function with ADLs. []  [x]  []  []    Pt will have decreased paraspinal mm tension to tolerate standing >90 minutes for work and home activities. []  [x]  []  []    Pt will demonstrate improved core strength to be able to  perform modified dead bug with <3/10 pain. []  [x]  []  []    Pt will be independent and compliant with comprehensive HEP to maintain progress achieved in PT []  []  [x]  []                                                                   Plan  Plan to continue PT for pain management and core stability with return to spinal mobility as tolerated to facilitate return to PLOF.    Treatment Last 4 Visits  Treatment Day: 12       4/3/2025 4/24/2025 5/1/2025 5/8/2025   Spine Treatment   Therapeutic Exercise Hip ADD foam roll squeeze 5\" 10x2 with TA brace   Hip ABD 5\" 10x2 isometric belt  with TA brace and breath support   Manual piriformis, hamstring, hip ADD, glut stretch 30 sec 3x RLE piriformis stretch LLE   SL SKTC 30 sec 3x RLE   SL hip flexor stretch 30 sec 3x RLE     Hook lying hip ABD green abnd 10x3 Hip ADD foam roll squeeze 5\" 15x2 with TA brace   Hip ABD 5\" 10x2 isometric belt  with TA brace and breath support   Manual piriformis, hamstring, hip ADD, glut stretch 30 sec 3x RLE piriformis stretch LLE   SL SKTC 30 sec 3x RLE   SL hip flexor stretch 30 sec 3x RLE   Hook lying hip ABD green band 10x3    Hip ADD foam roll squeeze 5\" 15x2 with TA brace   Hip ABD 5\" 10x2 isometric belt  with TA brace and breath support   Manual piriformis, hamstring, hip ADD, glut stretch 30 sec 3x RLE piriformis stretch LLE   SL SKTC 30 sec 3x RLE   SL hip flexor stretch 30 sec 3x RLE   Hook lying hip ABD green band 10x3    Manual ADD, glut stretch 30 sec 3x RLE piriformis stretch LLE   SL SKTC 30 sec 3x RLE   SL hip flexor stretch 30 sec 3x RLE      Neuro Re-Education SL Clams 15x  Hook lying Foam roll push downs 15x 3\" hold with exhale     SL Clams 15x2  Hook lying Foam roll push downs 15x 3\" hold with exhale     BKFO green band 5x2 each LE Hook lying Foam roll push downs 15x 3\" hold with exhale      Standing PPT on wall 10x3  Clam with core activation 10x3 RLE  Hook lying Foam roll push downs 15x 3\" hold with exhale  PPT with  ball squeeze 10x3  C sit seated 10x 3\"    Manual Therapy STM SL R iliocostalis and QL effluerage    STM SL R iliocostalis and QL effluerage STM SL R piriformis deep pressure to effleurage paraspinal TP T10-L1 STM R glut, QL iliocostalis 15 mins effleurage to deep pressure   Therapeutic Exercise Minutes 24 10 15 10   Neuro Re-Educ Minutes 8 18  20   Manual Therapy Minutes 10 16 15 15   Total Time Of Timed Procedures 42 44 30 45   Total Time Of Service-Based Procedures 0 0 0 0   Total Treatment Time 42 44 30 45   HEP Access Code: [email or web address]   URL: [email or web address]   Date: 01/14/2025   Prepared by: Julianne Miller      Exercises   - Supine 90/90 Abdominal Bracing  - 1 x daily - 7 x weekly - 3 sets - 10 reps - 5 sec hold    Access Code: [email or web address]  URL: [email or web address]  Date: 04/24/2025  Prepared by: Julianne Miller    Exercises  - Supine 90/90 Abdominal Bracing  - 1 x daily - 7 x weekly - 3 sets - 10 reps - 5 sec hold  - Hooklying Single Leg Bent Knee Fallouts with Resistance  - 1 x daily - 7 x weekly - 3 sets - 10 reps  - Supine Hip Adduction Isometric with Ball  - 1 x daily - 7 x weekly - 2 sets - 15 reps - 5 sec hold  - Abdominal Press into Ball  - 1 x daily - 7 x weekly - 2 sets - 15 reps  - Supine Piriformis Stretch with Leg Straight  - 3 x daily - 7 x weekly - 3 sets - 3 reps - 30 sec hold  - Supine Single Knee to Chest Stretch  - 3 x daily - 7 x weekly - 3 sets - 3 reps - 30 hold   Access Code: [email or web address]  URL: https://[email or web address]  Date: 04/24/2025  Prepared by: Julianne Miller     Exercises  - Supine 90/90 Abdominal Bracing  - 1 x daily - 7 x weekly - 3 sets - 10 reps - 5 sec hold  - Hooklying Single Leg Bent Knee Fallouts with Resistance  - 1 x daily - 7 x weekly - 3 sets - 10 reps  - Supine Hip Adduction Isometric with Ball  - 1 x daily - 7 x weekly - 2 sets - 15 reps - 5 sec hold  - Abdominal Press into Ball  - 1 x daily - 7 x weekly - 2 sets  - 15 reps  - Supine Piriformis Stretch with Leg Straight  - 3 x daily - 7 x weekly - 3 sets - 3 reps - 30 sec hold  - Supine Single Knee to Chest Stretch  - 3 x daily - 7 x weekly - 3 sets - 3 reps - 30 hold             HEP    Access Code: [email or web address]  URL: [email or web address]  Date: 04/24/2025  Prepared by: Julianne Miller     Exercises  - Supine 90/90 Abdominal Bracing  - 1 x daily - 7 x weekly - 3 sets - 10 reps - 5 sec hold  - Hooklying Single Leg Bent Knee Fallouts with Resistance  - 1 x daily - 7 x weekly - 3 sets - 10 reps  - Supine Hip Adduction Isometric with Ball  - 1 x daily - 7 x weekly - 2 sets - 15 reps - 5 sec hold  - Abdominal Press into Ball  - 1 x daily - 7 x weekly - 2 sets - 15 reps  - Supine Piriformis Stretch with Leg Straight  - 3 x daily - 7 x weekly - 3 sets - 3 reps - 30 sec hold  - Supine Single Knee to Chest Stretch  - 3 x daily - 7 x weekly - 3 sets - 3 reps - 30 hold        Charges  1 ther ex 1 neuro re ed 1 manual

## 2025-05-13 ENCOUNTER — APPOINTMENT (OUTPATIENT)
Dept: PHYSICAL THERAPY | Age: 70
End: 2025-05-13
Attending: INTERNAL MEDICINE
Payer: MEDICARE

## 2025-05-22 ENCOUNTER — OFFICE VISIT (OUTPATIENT)
Dept: PHYSICAL THERAPY | Age: 70
End: 2025-05-22
Attending: INTERNAL MEDICINE
Payer: MEDICARE

## 2025-05-22 PROCEDURE — 97110 THERAPEUTIC EXERCISES: CPT

## 2025-05-22 PROCEDURE — 97112 NEUROMUSCULAR REEDUCATION: CPT

## 2025-05-22 PROCEDURE — 97140 MANUAL THERAPY 1/> REGIONS: CPT

## 2025-05-22 NOTE — PROGRESS NOTES
Patient: Armida Suarez (70 year old, female) Referring Provider:  Insurance:   Diagnosis: Acute right-sided low back pain without sciatica (M54.50)  Imbalance (R26.89 Ashely Adriel  [employer]   Date of Surgery: No data recorded Next MD visit:  MEDICARE   Precautions:  Fall Risk No data recorded Referral Information:    Date of Evaluation: Req: 0, Auth: 0, Exp:     No data recorded POC Auth Visits:  16       Today's Date   5/22/2025    Subjective  Pt states her back grabbed her yesterday, PPT and TA contraction controls it, ice helped. today is not so bad.       Pain: 2/10     Objective               Assessment  Added seated core march to challenge stability. All complted without aggravation.    Goals (to be met in  )       Goals         Therapy Goals         Not Met Progress Toward Partially Met Met   Pt will improve transversus abdominis recruitment to perform proper isometric contraction without requiring verbal or tactile cuing to promote advancement of therex. []  [x]  []  []    Pt will demonstrate good understanding of proper posture and body mechanics to decrease pain and improve spinal safety. []  [x]  []  []    Pt will improve lumbar spine AROM lateral flexion to <60 cm Finger tip to floor to allow increase ease with bending forward to don shoes. [x]  []  []  []    Pt will report improved symptom centralization and absence of radicular symptoms for 3 consecutive days to improve function with ADLs. []  [x]  []  []    Pt will have decreased paraspinal mm tension to tolerate standing >90 minutes for work and home activities. []  [x]  []  []    Pt will demonstrate improved core strength to be able to perform modified dead bug with <3/10 pain. []  [x]  []  []    Pt will be independent and compliant with comprehensive HEP to maintain progress achieved in PT []  []  [x]  []                                                                       Plan  Plan to continue PT for pain management and core stability with  return to spinal mobility as tolerated to facilitate return to PLOF. May d/c at visit 14 or schedule further out for management.    Treatment Last 4 Visits  Treatment Day: 13 4/24/2025 5/1/2025 5/8/2025 5/22/2025   Spine Treatment   Therapeutic Exercise Hip ADD foam roll squeeze 5\" 15x2 with TA brace   Hip ABD 5\" 10x2 isometric belt  with TA brace and breath support   Manual piriformis, hamstring, hip ADD, glut stretch 30 sec 3x RLE piriformis stretch LLE   SL SKTC 30 sec 3x RLE   SL hip flexor stretch 30 sec 3x RLE   Hook lying hip ABD green band 10x3    Hip ADD foam roll squeeze 5\" 15x2 with TA brace   Hip ABD 5\" 10x2 isometric belt  with TA brace and breath support   Manual piriformis, hamstring, hip ADD, glut stretch 30 sec 3x RLE piriformis stretch LLE   SL SKTC 30 sec 3x RLE   SL hip flexor stretch 30 sec 3x RLE   Hook lying hip ABD green band 10x3    Manual ADD, glut stretch 30 sec 3x RLE piriformis stretch LLE   SL SKTC 30 sec 3x RLE   SL hip flexor stretch 30 sec 3x RLE    Nu step level 3 10 mins  Manual ADD, glut stretch 30 sec 3x RLE piriformis stretch LLE   SL SKTC 30 sec 3x RLE   SL hip flexor stretch 30 sec 3x RLE         Neuro Re-Education SL Clams 15x2  Hook lying Foam roll push downs 15x 3\" hold with exhale     BKFO green band 5x2 each LE Hook lying Foam roll push downs 15x 3\" hold with exhale      Standing PPT on wall 10x3  Clam with core activation 10x3 RLE  Hook lying Foam roll push downs 15x 3\" hold with exhale  PPT with ball squeeze 10x3  C sit seated 10x 3\"  Standing PPT on wall 10x3  Hook lying Clam with core activation 10x3 RLE t band Blue  Hook lying Foam roll push downs 15x 3\" hold with exhale  PPT with ball squeeze 10x3  C sit seated 10x 3\"   Seated EOB TA march 12x each alternating feet     Manual Therapy STM SL R iliocostalis and QL effluerage STM SL R piriformis deep pressure to effleurage paraspinal TP T10-L1 STM R glut, QL iliocostalis 15 mins effleurage to deep pressure STM R  glut, QL iliocostalis 15 mins effleurage to deep pressure  TTP hypertonic just distal to PSIS in R glut   Therapeutic Exercise Minutes 10 15 10 20   Neuro Re-Educ Minutes 18  20 12   Manual Therapy Minutes 16 15 15 15   Total Time Of Timed Procedures 44 30 45 47   Total Time Of Service-Based Procedures 0 0 0 0   Total Treatment Time 44 30 45 47   HEP Access Code: [email or web address]  URL: [email or web address]  Date: 04/24/2025  Prepared by: Julianne Miller    Exercises  - Supine 90/90 Abdominal Bracing  - 1 x daily - 7 x weekly - 3 sets - 10 reps - 5 sec hold  - Hooklying Single Leg Bent Knee Fallouts with Resistance  - 1 x daily - 7 x weekly - 3 sets - 10 reps  - Supine Hip Adduction Isometric with Ball  - 1 x daily - 7 x weekly - 2 sets - 15 reps - 5 sec hold  - Abdominal Press into Ball  - 1 x daily - 7 x weekly - 2 sets - 15 reps  - Supine Piriformis Stretch with Leg Straight  - 3 x daily - 7 x weekly - 3 sets - 3 reps - 30 sec hold  - Supine Single Knee to Chest Stretch  - 3 x daily - 7 x weekly - 3 sets - 3 reps - 30 hold   Access Code: [email or web address]  URL: [email or web address]  Date: 04/24/2025  Prepared by: Julianne Miller     Exercises  - Supine 90/90 Abdominal Bracing  - 1 x daily - 7 x weekly - 3 sets - 10 reps - 5 sec hold  - Hooklying Single Leg Bent Knee Fallouts with Resistance  - 1 x daily - 7 x weekly - 3 sets - 10 reps  - Supine Hip Adduction Isometric with Ball  - 1 x daily - 7 x weekly - 2 sets - 15 reps - 5 sec hold  - Abdominal Press into Ball  - 1 x daily - 7 x weekly - 2 sets - 15 reps  - Supine Piriformis Stretch with Leg Straight  - 3 x daily - 7 x weekly - 3 sets - 3 reps - 30 sec hold  - Supine Single Knee to Chest Stretch  - 3 x daily - 7 x weekly - 3 sets - 3 reps - 30 hold      Access Code: [email or web address]  URL: [email or web address]  Date: 05/22/2025  Prepared by: Julianne Miller    Exercises  - Supine 90/90 Abdominal Bracing  - 1 x daily - 7 x  weekly - 3 sets - 10 reps - 5 sec hold  - Hooklying Single Leg Bent Knee Fallouts with Resistance  - 1 x daily - 7 x weekly - 3 sets - 10 reps  - Supine Hip Adduction Isometric with Ball  - 1 x daily - 7 x weekly - 2 sets - 15 reps - 5 sec hold  - Abdominal Press into Ball  - 1 x daily - 7 x weekly - 2 sets - 15 reps  - Supine Piriformis Stretch with Leg Straight  - 3 x daily - 7 x weekly - 3 sets - 3 reps - 30 sec hold  - Supine Single Knee to Chest Stretch  - 3 x daily - 7 x weekly - 3 sets - 3 reps - 30 hold  - Seated March  - 1 x daily - 7 x weekly - 3 sets - 10 reps  C sit 10x3        HEP  Access Code: [email or web address]  URL: [email or web address]  Date: 05/22/2025  Prepared by: Julianne Miller    Exercises  - Supine 90/90 Abdominal Bracing  - 1 x daily - 7 x weekly - 3 sets - 10 reps - 5 sec hold  - Hooklying Single Leg Bent Knee Fallouts with Resistance  - 1 x daily - 7 x weekly - 3 sets - 10 reps  - Supine Hip Adduction Isometric with Ball  - 1 x daily - 7 x weekly - 2 sets - 15 reps - 5 sec hold  - Abdominal Press into Ball  - 1 x daily - 7 x weekly - 2 sets - 15 reps  - Supine Piriformis Stretch with Leg Straight  - 3 x daily - 7 x weekly - 3 sets - 3 reps - 30 sec hold  - Supine Single Knee to Chest Stretch  - 3 x daily - 7 x weekly - 3 sets - 3 reps - 30 hold  - Seated March  - 1 x daily - 7 x weekly - 3 sets - 10 reps  C sit 10x3    Charges  1 ther ex 1 manual 1 neuro re ed

## 2025-05-29 ENCOUNTER — OFFICE VISIT (OUTPATIENT)
Dept: PHYSICAL THERAPY | Age: 70
End: 2025-05-29
Attending: INTERNAL MEDICINE
Payer: MEDICARE

## 2025-05-29 PROCEDURE — 97140 MANUAL THERAPY 1/> REGIONS: CPT

## 2025-05-29 PROCEDURE — 97110 THERAPEUTIC EXERCISES: CPT

## 2025-05-29 PROCEDURE — 97112 NEUROMUSCULAR REEDUCATION: CPT

## 2025-05-29 NOTE — PROGRESS NOTES
Patient: Armida Suarez (70 year old, female) Referring Provider:  Insurance:   Diagnosis: Acute right-sided low back pain without sciatica (M54.50)  Imbalance (R26.89 Ashely Adriel  [employer]   Date of Surgery: No data recorded Next MD visit:  MEDICARE   Precautions:  Fall Risk No data recorded Referral Information:    Date of Evaluation: Req: 0, Auth: 0, Exp:     No data recorded POC Auth Visits:  16       Today's Date   5/29/2025    Subjective  Pt states her back has been grabbing her since yestetday, she reprots shifted to the R and limping. Soreness in B quads after marchiung exercise last visit. She co feeling swollen all over today She states overall, it's better, had an active weekend with more driving and did not flare up. She can now lay on her back at night when previously she could not toelrate. it       Pain: 4/10     Objective  R anterior inominate            Assessment  R anterior inominate this visit, mitigated with MET and core activation. Pt improving wiht core stabilization without aggravation of symptoms.    Goals (to be met in  )       Goals         Therapy Goals         Not Met Progress Toward Partially Met Met   Pt will improve transversus abdominis recruitment to perform proper isometric contraction without requiring verbal or tactile cuing to promote advancement of therex. []  [x]  []  []    Pt will demonstrate good understanding of proper posture and body mechanics to decrease pain and improve spinal safety. []  [x]  []  []    Pt will improve lumbar spine AROM lateral flexion to <60 cm Finger tip to floor to allow increase ease with bending forward to don shoes. [x]  []  []  []    Pt will report improved symptom centralization and absence of radicular symptoms for 3 consecutive days to improve function with ADLs. []  [x]  []  []    Pt will have decreased paraspinal mm tension to tolerate standing >90 minutes for work and home activities. []  [x]  []  []    Pt will demonstrate improved  core strength to be able to perform modified dead bug with <3/10 pain. []  [x]  []  []    Pt will be independent and compliant with comprehensive HEP to maintain progress achieved in PT []  []  [x]  []                                                                           Plan  Reduce Frequeny to every other week for 2 additional visits to ensure maintainence with HEP and symptom management.    Treatment Last 4 Visits  Treatment Day: 14 5/1/2025 5/8/2025 5/22/2025 5/29/2025   Spine Treatment   Therapeutic Exercise Hip ADD foam roll squeeze 5\" 15x2 with TA brace   Hip ABD 5\" 10x2 isometric belt  with TA brace and breath support   Manual piriformis, hamstring, hip ADD, glut stretch 30 sec 3x RLE piriformis stretch LLE   SL SKTC 30 sec 3x RLE   SL hip flexor stretch 30 sec 3x RLE   Hook lying hip ABD green band 10x3    Manual ADD, glut stretch 30 sec 3x RLE piriformis stretch LLE   SL SKTC 30 sec 3x RLE   SL hip flexor stretch 30 sec 3x RLE    Nu step level 3 10 mins  Manual ADD, glut stretch 30 sec 3x RLE piriformis stretch LLE   SL SKTC 30 sec 3x RLE   SL hip flexor stretch 30 sec 3x RLE       Nu step level 3 10 mins   Manual ADD, glut stretch 30 sec 3x RLE piriformis stretch LLE   SL SKTC 30 sec 3x RLE   SL hip flexor stretch 30 sec 3x RLE   Hook lying hip ADD squeeze 10 sec hold 5x and hip ABD 10 sec hold 5x     Neuro Re-Education Hook lying Foam roll push downs 15x 3\" hold with exhale      Standing PPT on wall 10x3  Clam with core activation 10x3 RLE  Hook lying Foam roll push downs 15x 3\" hold with exhale  PPT with ball squeeze 10x3  C sit seated 10x 3\"  Standing PPT on wall 10x3  Hook lying Clam with core activation 10x3 RLE t band Blue  Hook lying Foam roll push downs 15x 3\" hold with exhale  PPT with ball squeeze 10x3  C sit seated 10x 3\"   Seated EOB TA march 12x each alternating feet   Hook lying Clam with core activation 10x3   Hook lying Foam roll push downs 15x 3\" hold with exhale   TA brace and  heel slide 10x each LE with belly pump  C sit review      Manual Therapy STM SL R piriformis deep pressure to effleurage paraspinal TP T10-L1 STM R glut, QL iliocostalis 15 mins effleurage to deep pressure STM R glut, QL iliocostalis 15 mins effleurage to deep pressure  TTP hypertonic just distal to PSIS in R glut STM R glut, QL iliocostalis effleurage to deep pressure   TTP hypertonic just distal to PSIS in R glut   R SI MET with hamstring and glut iso 10 sec hold 5x   Therapeutic Exercise Minutes 15 10 20 10   Neuro Re-Educ Minutes  20 12 18   Manual Therapy Minutes 15 15 15 19   Total Time Of Timed Procedures 30 45 47 47   Total Time Of Service-Based Procedures 0 0 0 0   Total Treatment Time 30 45 47 47   HEP   Access Code: [email or web address]  URL: [email or web address]  Date: 04/24/2025  Prepared by: Julianne Miller     Exercises  - Supine 90/90 Abdominal Bracing  - 1 x daily - 7 x weekly - 3 sets - 10 reps - 5 sec hold  - Hooklying Single Leg Bent Knee Fallouts with Resistance  - 1 x daily - 7 x weekly - 3 sets - 10 reps  - Supine Hip Adduction Isometric with Ball  - 1 x daily - 7 x weekly - 2 sets - 15 reps - 5 sec hold  - Abdominal Press into Ball  - 1 x daily - 7 x weekly - 2 sets - 15 reps  - Supine Piriformis Stretch with Leg Straight  - 3 x daily - 7 x weekly - 3 sets - 3 reps - 30 sec hold  - Supine Single Knee to Chest Stretch  - 3 x daily - 7 x weekly - 3 sets - 3 reps - 30 hold      Access Code: [email or web address]  URL: [email or web address]  Date: 05/22/2025  Prepared by: Julianne Whitaker Paul    Exercises  - Supine 90/90 Abdominal Bracing  - 1 x daily - 7 x weekly - 3 sets - 10 reps - 5 sec hold  - Hooklying Single Leg Bent Knee Fallouts with Resistance  - 1 x daily - 7 x weekly - 3 sets - 10 reps  - Supine Hip Adduction Isometric with Ball  - 1 x daily - 7 x weekly - 2 sets - 15 reps - 5 sec hold  - Abdominal Press into Ball  - 1 x daily - 7 x weekly - 2 sets - 15 reps  - Supine  Piriformis Stretch with Leg Straight  - 3 x daily - 7 x weekly - 3 sets - 3 reps - 30 sec hold  - Supine Single Knee to Chest Stretch  - 3 x daily - 7 x weekly - 3 sets - 3 reps - 30 hold  - Seated March  - 1 x daily - 7 x weekly - 3 sets - 10 reps  C sit 10x3   Access Code: [email or web address]  URL: [email or web address]  Date: 05/22/2025  Prepared by: Julianne Whitaker Paul    Exercises  - Supine 90/90 Abdominal Bracing  - 1 x daily - 7 x weekly - 3 sets - 10 reps - 5 sec hold  - Hooklying Single Leg Bent Knee Fallouts with Resistance  - 1 x daily - 7 x weekly - 3 sets - 10 reps  - Supine Hip Adduction Isometric with Ball  - 1 x daily - 7 x weekly - 2 sets - 15 reps - 5 sec hold  - Abdominal Press into Ball  - 1 x daily - 7 x weekly - 2 sets - 15 reps  - Supine Piriformis Stretch with Leg Straight  - 3 x daily - 7 x weekly - 3 sets - 3 reps - 30 sec hold  - Supine Single Knee to Chest Stretch  - 3 x daily - 7 x weekly - 3 sets - 3 reps - 30 hold  - Seated March  - 1 x daily - 7 x weekly - 3 sets - 10 reps  C sit 10x3            HEP    Access Code: [email or web address]  URL: [email or web address]  Date: 05/22/2025  Prepared by: Julianne Whitaker Paul    Exercises  - Supine 90/90 Abdominal Bracing  - 1 x daily - 7 x weekly - 3 sets - 10 reps - 5 sec hold  - Hooklying Single Leg Bent Knee Fallouts with Resistance  - 1 x daily - 7 x weekly - 3 sets - 10 reps  - Supine Hip Adduction Isometric with Ball  - 1 x daily - 7 x weekly - 2 sets - 15 reps - 5 sec hold  - Abdominal Press into Ball  - 1 x daily - 7 x weekly - 2 sets - 15 reps  - Supine Piriformis Stretch with Leg Straight  - 3 x daily - 7 x weekly - 3 sets - 3 reps - 30 sec hold  - Supine Single Knee to Chest Stretch  - 3 x daily - 7 x weekly - 3 sets - 3 reps - 30 hold  - Seated March  - 1 x daily - 7 x weekly - 3 sets - 10 reps  C sit 10x3        Charges  1 ther ex 1 neuro re ed 1 manual

## 2025-06-10 ENCOUNTER — OFFICE VISIT (OUTPATIENT)
Dept: PHYSICAL THERAPY | Age: 70
End: 2025-06-10
Attending: INTERNAL MEDICINE
Payer: MEDICARE

## 2025-06-10 PROCEDURE — 97140 MANUAL THERAPY 1/> REGIONS: CPT

## 2025-06-10 PROCEDURE — 97110 THERAPEUTIC EXERCISES: CPT

## 2025-06-10 PROCEDURE — 97112 NEUROMUSCULAR REEDUCATION: CPT

## 2025-06-10 NOTE — PROGRESS NOTES
Patient: Armida Suarez (70 year old, female) Referring Provider:  Insurance:   Diagnosis: Acute right-sided low back pain without sciatica (M54.50)  Imbalance (R26.89 Ashely Adriel  [employer]   Date of Surgery: No data recorded Next MD visit:  MEDICARE   Precautions:  Fall Risk No data recorded Referral Information:    Date of Evaluation: Req: 0, Auth: 0, Exp:     No data recorded POC Auth Visits:  16       Today's Date   6/10/2025    Subjective  Pt states she is feeling some tightness, notes her stim is zapping her with certain movements.       Pain: 3/10     Objective  pelvis level            Assessment  Improving core control and pelvic stability. Additions of Glut med closed chain ther ex to improve activation. Attempted SL modified plank up, pt unable to clear without pain, thus terminated.    Goals (to be met in 16 visits)       Goals         Therapy Goals         Not Met Progress Toward Partially Met Met   Pt will improve transversus abdominis recruitment to perform proper isometric contraction without requiring verbal or tactile cuing to promote advancement of therex. []  [x]  []  []    Pt will demonstrate good understanding of proper posture and body mechanics to decrease pain and improve spinal safety. []  [x]  []  []    Pt will improve lumbar spine AROM lateral flexion to <60 cm Finger tip to floor to allow increase ease with bending forward to don shoes. [x]  []  []  []    Pt will report improved symptom centralization and absence of radicular symptoms for 3 consecutive days to improve function with ADLs. []  [x]  []  []    Pt will have decreased paraspinal mm tension to tolerate standing >90 minutes for work and home activities. []  [x]  []  []    Pt will demonstrate improved core strength to be able to perform modified dead bug with <3/10 pain. []  [x]  []  []    Pt will be independent and compliant with comprehensive HEP to maintain progress achieved in PT []  []  [x]  []                                                                                Plan  Reduce Frequeny to every other week for 2 additional visits to ensure maintainence with HEP and symptom management. D/C vs PN to continue depending on symptom report.    Treatment Last 4 Visits  Treatment Day: 15       5/8/2025 5/22/2025 5/29/2025 6/10/2025   Spine Treatment   Therapeutic Exercise Manual ADD, glut stretch 30 sec 3x RLE piriformis stretch LLE   SL SKTC 30 sec 3x RLE   SL hip flexor stretch 30 sec 3x RLE    Nu step level 3 10 mins  Manual ADD, glut stretch 30 sec 3x RLE piriformis stretch LLE   SL SKTC 30 sec 3x RLE   SL hip flexor stretch 30 sec 3x RLE       Nu step level 3 10 mins   Manual ADD, glut stretch 30 sec 3x RLE piriformis stretch LLE   SL SKTC 30 sec 3x RLE   SL hip flexor stretch 30 sec 3x RLE   Hook lying hip ADD squeeze 10 sec hold 5x and hip ABD 10 sec hold 5x   Nu step level 3 10 mins   Manual ADD, glut stretch 30 sec 3x BLE piriformis stretch LLE   SL QL stretch 30 sec 3x      Neuro Re-Education Standing PPT on wall 10x3  Clam with core activation 10x3 RLE  Hook lying Foam roll push downs 15x 3\" hold with exhale  PPT with ball squeeze 10x3  C sit seated 10x 3\"  Standing PPT on wall 10x3  Hook lying Clam with core activation 10x3 RLE t band Blue  Hook lying Foam roll push downs 15x 3\" hold with exhale  PPT with ball squeeze 10x3  C sit seated 10x 3\"   Seated EOB TA march 12x each alternating feet   Hook lying Clam with core activation 10x3   Hook lying Foam roll push downs 15x 3\" hold with exhale   TA brace and heel slide 10x each LE with belly pump  C sit review    Hook lying Clam with core activation 10x3   Hook lying Foam roll push downs 15x 3\" hold with exhale   Sitting TA brace and march/hover 10x each LE  Standing Glut med hip lift on wall 10x2    Manual Therapy STM R glut, QL iliocostalis 15 mins effleurage to deep pressure STM R glut, QL iliocostalis 15 mins effleurage to deep pressure  TTP hypertonic just distal to  PSIS in R glut STM R glut, QL iliocostalis effleurage to deep pressure   TTP hypertonic just distal to PSIS in R glut   R SI MET with hamstring and glut iso 10 sec hold 5x STM R glut, QL iliocostalis effleurage to deep pressure      Therapeutic Exercise Minutes 10 20 10 20   Neuro Re-Educ Minutes 20 12 18 15   Manual Therapy Minutes 15 15 19 10   Total Time Of Timed Procedures 45 47 47 45   Total Time Of Service-Based Procedures 0 0 0 0   Total Treatment Time 45 47 47 45   HEP  Access Code: [email or web address]  URL: [email or web address]  Date: 05/22/2025  Prepared by: Julianne Miller    Exercises  - Supine 90/90 Abdominal Bracing  - 1 x daily - 7 x weekly - 3 sets - 10 reps - 5 sec hold  - Hooklying Single Leg Bent Knee Fallouts with Resistance  - 1 x daily - 7 x weekly - 3 sets - 10 reps  - Supine Hip Adduction Isometric with Ball  - 1 x daily - 7 x weekly - 2 sets - 15 reps - 5 sec hold  - Abdominal Press into Ball  - 1 x daily - 7 x weekly - 2 sets - 15 reps  - Supine Piriformis Stretch with Leg Straight  - 3 x daily - 7 x weekly - 3 sets - 3 reps - 30 sec hold  - Supine Single Knee to Chest Stretch  - 3 x daily - 7 x weekly - 3 sets - 3 reps - 30 hold  - Seated March  - 1 x daily - 7 x weekly - 3 sets - 10 reps  C sit 10x3   Access Code: [email or web address]  URL: [email or web address]  Date: 05/22/2025  Prepared by: Julianne Miller    Exercises  - Supine 90/90 Abdominal Bracing  - 1 x daily - 7 x weekly - 3 sets - 10 reps - 5 sec hold  - Hooklying Single Leg Bent Knee Fallouts with Resistance  - 1 x daily - 7 x weekly - 3 sets - 10 reps  - Supine Hip Adduction Isometric with Ball  - 1 x daily - 7 x weekly - 2 sets - 15 reps - 5 sec hold  - Abdominal Press into Ball  - 1 x daily - 7 x weekly - 2 sets - 15 reps  - Supine Piriformis Stretch with Leg Straight  - 3 x daily - 7 x weekly - 3 sets - 3 reps - 30 sec hold  - Supine Single Knee to Chest Stretch  - 3 x daily - 7 x weekly - 3 sets - 3 reps  - 30 hold  - Seated March  - 1 x daily - 7 x weekly - 3 sets - 10 reps  C sit 10x3     Access Code: [email or web address]  URL: [email or web address]  Date: 06/10/2025  Prepared by: Julianne Miller    Exercises  - Supine 90/90 Abdominal Bracing  - 1 x daily - 7 x weekly - 3 sets - 10 reps - 5 sec hold  - Hooklying Single Leg Bent Knee Fallouts with Resistance  - 1 x daily - 7 x weekly - 3 sets - 10 reps  - Supine Hip Adduction Isometric with Ball  - 1 x daily - 7 x weekly - 2 sets - 15 reps - 5 sec hold  - Abdominal Press into Ball  - 1 x daily - 7 x weekly - 2 sets - 15 reps  - Supine Piriformis Stretch with Leg Straight  - 3 x daily - 7 x weekly - 3 sets - 3 reps - 30 sec hold  - Supine Single Knee to Chest Stretch  - 3 x daily - 7 x weekly - 3 sets - 3 reps - 30 hold  - Seated March  - 1 x daily - 7 x weekly - 3 sets - 10 reps  - Isometric Gluteus Medius at Wall  - 1 x daily - 7 x weekly - 2 sets - 10 reps  SL QL stretch 30 sec 3x        HEP  Access Code: [email or web address]  URL: [email or web address]  Date: 06/10/2025  Prepared by: Julianne Miller    Exercises  - Supine 90/90 Abdominal Bracing  - 1 x daily - 7 x weekly - 3 sets - 10 reps - 5 sec hold  - Hooklying Single Leg Bent Knee Fallouts with Resistance  - 1 x daily - 7 x weekly - 3 sets - 10 reps  - Supine Hip Adduction Isometric with Ball  - 1 x daily - 7 x weekly - 2 sets - 15 reps - 5 sec hold  - Abdominal Press into Ball  - 1 x daily - 7 x weekly - 2 sets - 15 reps  - Supine Piriformis Stretch with Leg Straight  - 3 x daily - 7 x weekly - 3 sets - 3 reps - 30 sec hold  - Supine Single Knee to Chest Stretch  - 3 x daily - 7 x weekly - 3 sets - 3 reps - 30 hold  - Seated March  - 1 x daily - 7 x weekly - 3 sets - 10 reps  - Isometric Gluteus Medius at Wall  - 1 x daily - 7 x weekly - 2 sets - 10 reps  SL QL stretch 30 sec 3x    Charges  1 ther ex 1 neuro re ed 1 manual

## 2025-07-01 ENCOUNTER — OFFICE VISIT (OUTPATIENT)
Dept: PHYSICAL THERAPY | Age: 70
End: 2025-07-01
Attending: INTERNAL MEDICINE
Payer: MEDICARE

## 2025-07-01 PROCEDURE — 97140 MANUAL THERAPY 1/> REGIONS: CPT

## 2025-07-01 PROCEDURE — 97110 THERAPEUTIC EXERCISES: CPT

## 2025-07-01 PROCEDURE — 97112 NEUROMUSCULAR REEDUCATION: CPT

## 2025-07-01 NOTE — PROGRESS NOTES
Patient: Armida Suarez (70 year old, female) Referring Provider:  Insurance:   Diagnosis: Acute right-sided low back pain without sciatica (M54.50)  Imbalance (R26.89 Ashely Adriel  COMMERCIAL   Date of Surgery: No data recorded Next MD visit:  MEDICARE   Precautions:  Fall Risk No data recorded Referral Information:    Date of Evaluation: Req: 0, Auth: 0, Exp:     No data recorded POC Auth Visits:  16      Progress Summary  Pt has attended 16 visits in Physical Therapy.     Today's Date   7/1/2025    Subjective  Pt states she feels her back goes out at least 1x/week. Transition to sitting, B knee above patella. She states pain is now about 3/10. She states she has been putting a pillow between her knees at night and no longer has so much tightness in R side. Able to complete a 60-90 min massage.       Pain: 3/10     Objective  SB R 56 cm finger tip to floor and L 57 cm pelvis level          Assessment     Pt to be d/c from skilled PT 2/2 meeting all goals. Pt has made gains in ROM and strength as evidenced by improved lateral flexion of L spine to within 1 cm difference each side. Pt has improved functional strength and spinal stability as evidenced by tolerance to standing of 60-90 min and return to MRADLs without aggravation of symptoms that pt is unable to manage independently. DEVIKA score improvement of 10% by discharge noted. Pt has HEP to maintain/progress strength, ROM independently.       Goals (to be met in 16 visits)       Goals         Therapy Goals         Not Met Progress Toward Partially Met Met   Pt will improve transversus abdominis recruitment to perform proper isometric contraction without requiring verbal or tactile cuing to promote advancement of therex. []  []  []  [x]    Pt will demonstrate good understanding of proper posture and body mechanics to decrease pain and improve spinal safety. []  []  []  [x]    Pt will improve lumbar spine AROM lateral flexion to <60 cm Finger tip to floor to  allow increase ease with bending forward to don shoes. []  []  []  [x]    Pt will report improved symptom centralization and absence of radicular symptoms for 3 consecutive days to improve function with ADLs. []  []  []  [x]    Pt will have decreased paraspinal mm tension to tolerate standing >90 minutes for work and home activities. []  []  []  [x]    Pt will demonstrate improved core strength to be able to perform modified dead bug with <3/10 pain. []  []  []  [x]    Pt will be independent and compliant with comprehensive HEP to maintain progress achieved in PT []  []  []  [x]                                                                                   Plan  d/c to final HEP    Treatment Last 4 Visits  Treatment Day: 16       5/22/2025 5/29/2025 6/10/2025 7/1/2025   Spine Treatment   Therapeutic Exercise Nu step level 3 10 mins  Manual ADD, glut stretch 30 sec 3x RLE piriformis stretch LLE   SL SKTC 30 sec 3x RLE   SL hip flexor stretch 30 sec 3x RLE       Nu step level 3 10 mins   Manual ADD, glut stretch 30 sec 3x RLE piriformis stretch LLE   SL SKTC 30 sec 3x RLE   SL hip flexor stretch 30 sec 3x RLE   Hook lying hip ADD squeeze 10 sec hold 5x and hip ABD 10 sec hold 5x   Nu step level 3 10 mins   Manual ADD, glut stretch 30 sec 3x BLE piriformis stretch LLE   SL QL stretch 30 sec 3x    Nu step level 3 10 mins   Manual ADD, glut stretch 30 sec 3x BLE piriformis stretch LLE   SL hip flexor/quad stretch 30 sec 3x  SL QL stretch 30 sec 3x   Bridge 10x2   Neuro Re-Education Standing PPT on wall 10x3  Hook lying Clam with core activation 10x3 RLE t band Blue  Hook lying Foam roll push downs 15x 3\" hold with exhale  PPT with ball squeeze 10x3  C sit seated 10x 3\"   Seated EOB TA march 12x each alternating feet   Hook lying Clam with core activation 10x3   Hook lying Foam roll push downs 15x 3\" hold with exhale   TA brace and heel slide 10x each LE with belly pump  C sit review    Hook lying Clam with core  activation 10x3   Hook lying Foam roll push downs 15x 3\" hold with exhale   Sitting TA brace and march/hover 10x each LE  Standing Glut med hip lift on wall 10x2  Hook lying Clam with core activation 10x3   Modified dead bug 10x2   Hook lying Foam roll push downs 15x 3\" hold with exhale   Sitting TA brace and march/hover 10x each LE       Manual Therapy STM R glut, QL iliocostalis 15 mins effleurage to deep pressure  TTP hypertonic just distal to PSIS in R glut STM R glut, QL iliocostalis effleurage to deep pressure   TTP hypertonic just distal to PSIS in R glut   R SI MET with hamstring and glut iso 10 sec hold 5x STM R glut, QL iliocostalis effleurage to deep pressure    STM R QL iliocostalis effleurage to deep pressure         Therapeutic Exercise Minutes 20 10 20 15   Neuro Re-Educ Minutes 12 18 15 15   Manual Therapy Minutes 15 19 10 10   Total Time Of Timed Procedures 47 47 45 40   Total Time Of Service-Based Procedures 0 0 0 0   Total Treatment Time 47 47 45 40   HEP Access Code: [email or web address]  URL: [email or web address]  Date: 05/22/2025  Prepared by: Julianne Miller    Exercises  - Supine 90/90 Abdominal Bracing  - 1 x daily - 7 x weekly - 3 sets - 10 reps - 5 sec hold  - Hooklying Single Leg Bent Knee Fallouts with Resistance  - 1 x daily - 7 x weekly - 3 sets - 10 reps  - Supine Hip Adduction Isometric with Ball  - 1 x daily - 7 x weekly - 2 sets - 15 reps - 5 sec hold  - Abdominal Press into Ball  - 1 x daily - 7 x weekly - 2 sets - 15 reps  - Supine Piriformis Stretch with Leg Straight  - 3 x daily - 7 x weekly - 3 sets - 3 reps - 30 sec hold  - Supine Single Knee to Chest Stretch  - 3 x daily - 7 x weekly - 3 sets - 3 reps - 30 hold  - Seated March  - 1 x daily - 7 x weekly - 3 sets - 10 reps  C sit 10x3   Access Code: [email or web address]  URL: [email or web address]  Date: 05/22/2025  Prepared by: Julianne Miller    Exercises  - Supine 90/90 Abdominal Bracing  - 1 x daily - 7 x  weekly - 3 sets - 10 reps - 5 sec hold  - Hooklying Single Leg Bent Knee Fallouts with Resistance  - 1 x daily - 7 x weekly - 3 sets - 10 reps  - Supine Hip Adduction Isometric with Ball  - 1 x daily - 7 x weekly - 2 sets - 15 reps - 5 sec hold  - Abdominal Press into Ball  - 1 x daily - 7 x weekly - 2 sets - 15 reps  - Supine Piriformis Stretch with Leg Straight  - 3 x daily - 7 x weekly - 3 sets - 3 reps - 30 sec hold  - Supine Single Knee to Chest Stretch  - 3 x daily - 7 x weekly - 3 sets - 3 reps - 30 hold  - Seated March  - 1 x daily - 7 x weekly - 3 sets - 10 reps  C sit 10x3     Access Code: [email or web address]  URL: [email or web address]  Date: 06/10/2025  Prepared by: Julianne Miller    Exercises  - Supine 90/90 Abdominal Bracing  - 1 x daily - 7 x weekly - 3 sets - 10 reps - 5 sec hold  - Hooklying Single Leg Bent Knee Fallouts with Resistance  - 1 x daily - 7 x weekly - 3 sets - 10 reps  - Supine Hip Adduction Isometric with Ball  - 1 x daily - 7 x weekly - 2 sets - 15 reps - 5 sec hold  - Abdominal Press into Ball  - 1 x daily - 7 x weekly - 2 sets - 15 reps  - Supine Piriformis Stretch with Leg Straight  - 3 x daily - 7 x weekly - 3 sets - 3 reps - 30 sec hold  - Supine Single Knee to Chest Stretch  - 3 x daily - 7 x weekly - 3 sets - 3 reps - 30 hold  - Seated March  - 1 x daily - 7 x weekly - 3 sets - 10 reps  - Isometric Gluteus Medius at Wall  - 1 x daily - 7 x weekly - 2 sets - 10 reps  SL QL stretch 30 sec 3x Access Code: [email or web address]  URL: [email or web address]  Date: 07/01/2025  Prepared by: Julianne Miller    Exercises  - Supine 90/90 Abdominal Bracing  - 1 x daily - 7 x weekly - 3 sets - 10 reps - 5 sec hold  - Hooklying Single Leg Bent Knee Fallouts with Resistance  - 1 x daily - 7 x weekly - 3 sets - 10 reps  - Supine Hip Adduction Isometric with Ball  - 1 x daily - 7 x weekly - 2 sets - 15 reps - 5 sec hold  - Abdominal Press into Ball  - 1 x daily - 7 x weekly - 2  sets - 15 reps  - Supine Piriformis Stretch with Leg Straight  - 3 x daily - 7 x weekly - 3 sets - 3 reps - 30 sec hold  - Supine Single Knee to Chest Stretch  - 3 x daily - 7 x weekly - 3 sets - 3 reps - 30 hold  - Seated March  - 1 x daily - 7 x weekly - 3 sets - 10 reps  - Isometric Gluteus Medius at Wall  - 1 x daily - 7 x weekly - 2 sets - 10 reps  - Supine March  - 1 x daily - 7 x weekly - 3 sets - 10 reps  - Sidelying Quadriceps Stretch  - 1 x daily - 7 x weekly - 3 sets - 2 reps - 30 sec hold  - Seated Hamstring Stretch  - 3 x daily - 7 x weekly - 3 sets - 3 reps - 30 sec hold1        HEP  Access Code: [email or web address]  URL: [email or web address]  Date: 07/01/2025  Prepared by: Julianne Miller    Exercises  - Supine 90/90 Abdominal Bracing  - 1 x daily - 7 x weekly - 3 sets - 10 reps - 5 sec hold  - Hooklying Single Leg Bent Knee Fallouts with Resistance  - 1 x daily - 7 x weekly - 3 sets - 10 reps  - Supine Hip Adduction Isometric with Ball  - 1 x daily - 7 x weekly - 2 sets - 15 reps - 5 sec hold  - Abdominal Press into Ball  - 1 x daily - 7 x weekly - 2 sets - 15 reps  - Supine Piriformis Stretch with Leg Straight  - 3 x daily - 7 x weekly - 3 sets - 3 reps - 30 sec hold  - Supine Single Knee to Chest Stretch  - 3 x daily - 7 x weekly - 3 sets - 3 reps - 30 hold  - Seated March  - 1 x daily - 7 x weekly - 3 sets - 10 reps  - Isometric Gluteus Medius at Wall  - 1 x daily - 7 x weekly - 2 sets - 10 reps  - Supine March  - 1 x daily - 7 x weekly - 3 sets - 10 reps  - Sidelying Quadriceps Stretch  - 1 x daily - 7 x weekly - 3 sets - 2 reps - 30 sec hold  - Seated Hamstring Stretch  - 3 x daily - 7 x weekly - 3 sets - 3 reps - 30 sec hold1    Charges  1 ther ex 1 neuro re ed 1 manual     Oswestry Disability Index Score  Score: 22 % (3/20/2025 11:55 AM)    Post Oswestry Disability Index Score  Post Score: (Patient-Rptd) 12 % (7/1/2025  1:03 PM)    10 % improvement    Plan: Discontinue skilled  Physical Therapy      Patient/Family/Caregiver was advised of these findings, precautions, and treatment options and has agreed to actively participate in planning and for this course of care.    Thank you for your referral. If you have any questions, please contact me at Dept: [phone].    Sincerely,  Electronically signed by therapist: Ruthy Miller PT , DPT    Physician's certification required:  No  Please co-sign or sign and return this letter via fax as soon as possible to [phone].   I certify the need for these services furnished under this plan of treatment and while under my care.    X___________________________________________________ Date____________________    Certification From: 7/1/2025  To:9/29/2025

## 2025-07-24 ENCOUNTER — TELEPHONE (OUTPATIENT)
Dept: FAMILY MEDICINE CLINIC | Facility: CLINIC | Age: 70
End: 2025-07-24

## 2025-07-24 DIAGNOSIS — I10 PRIMARY HYPERTENSION: ICD-10-CM

## 2025-07-24 DIAGNOSIS — E55.9 VITAMIN D DEFICIENCY: Primary | ICD-10-CM

## 2025-07-24 NOTE — TELEPHONE ENCOUNTER
She is coming on August 20th for the labs before her wellness exam on August 21st.    Order in chart?

## 2025-07-25 RX ORDER — LOSARTAN POTASSIUM 25 MG/1
25 TABLET ORAL DAILY
Qty: 90 TABLET | Refills: 0 | Status: SHIPPED | OUTPATIENT
Start: 2025-07-25

## 2025-07-25 NOTE — TELEPHONE ENCOUNTER
Last office visit: 04/21/25  Future Appointments   Date Time Provider Department Center   8/20/2025  7:00 AM REF [facility] REF [facility] Ref [facility] [place]   8/21/2025 10:15 AM Ashely Morel MD [facility] [place]   Last filled: 04/26/25   Qty: 90 tablets w/ 0 refills  Last labs: 01/03/25  Last BP:   BP Readings from Last 3 Encounters:   04/21/25 128/66   04/03/25 148/82   03/11/25 130/68     Hypertension Medications Protocol [id number]/25/2025 09:29 AM   Protocol Details CMP or BMP in past 12 months    Last BP reading less than 140/90    In person appointment or virtual visit in the past 12 mos or appointment in next 3 mos    EGFRCR or GFRNAA > 50    Medication is active on med list

## 2025-08-14 PROBLEM — I21.4 NSTEMI (NON-ST ELEVATED MYOCARDIAL INFARCTION) (HCC): Status: RESOLVED | Noted: 2024-02-18 | Resolved: 2025-08-14

## 2025-08-20 ENCOUNTER — LABORATORY ENCOUNTER (OUTPATIENT)
Dept: LAB | Age: 70
End: 2025-08-20
Attending: INTERNAL MEDICINE

## 2025-08-20 DIAGNOSIS — E78.00 PURE HYPERCHOLESTEROLEMIA: ICD-10-CM

## 2025-08-20 DIAGNOSIS — E74.39 GLUCOSE INTOLERANCE: ICD-10-CM

## 2025-08-20 DIAGNOSIS — R73.01 IMPAIRED FASTING GLUCOSE: ICD-10-CM

## 2025-08-20 DIAGNOSIS — E78.00 HYPERCHOLESTEREMIA: ICD-10-CM

## 2025-08-20 DIAGNOSIS — I10 PRIMARY HYPERTENSION: ICD-10-CM

## 2025-08-20 LAB
ALBUMIN SERPL-MCNC: 4.5 G/DL (ref 3.2–4.8)
ALBUMIN/GLOB SERPL: 1.9 (ref 1–2)
ALP LIVER SERPL-CCNC: 65 U/L (ref 55–142)
ALT SERPL-CCNC: 24 U/L (ref 10–49)
ANION GAP SERPL CALC-SCNC: 7 MMOL/L (ref 0–18)
AST SERPL-CCNC: 23 U/L (ref ?–34)
BASOPHILS # BLD AUTO: 0.05 X10(3) UL (ref 0–0.2)
BASOPHILS NFR BLD AUTO: 0.8 %
BILIRUB SERPL-MCNC: 0.6 MG/DL (ref 0.2–1.1)
BUN BLD-MCNC: 11 MG/DL (ref 9–23)
CALCIUM BLD-MCNC: 9.5 MG/DL (ref 8.7–10.6)
CHLORIDE SERPL-SCNC: 109 MMOL/L (ref 98–112)
CHOLEST SERPL-MCNC: 121 MG/DL (ref ?–200)
CO2 SERPL-SCNC: 25 MMOL/L (ref 21–32)
CREAT BLD-MCNC: 0.73 MG/DL (ref 0.55–1.02)
EGFRCR SERPLBLD CKD-EPI 2021: 88 ML/MIN/1.73M2 (ref 60–?)
EOSINOPHIL # BLD AUTO: 0.11 X10(3) UL (ref 0–0.7)
EOSINOPHIL NFR BLD AUTO: 1.7 %
ERYTHROCYTE [DISTWIDTH] IN BLOOD BY AUTOMATED COUNT: 13.2 %
EST. AVERAGE GLUCOSE BLD GHB EST-MCNC: 128 MG/DL (ref 68–126)
FASTING PATIENT LIPID ANSWER: YES
FASTING STATUS PATIENT QL REPORTED: YES
GLOBULIN PLAS-MCNC: 2.4 G/DL (ref 2–3.5)
GLUCOSE BLD-MCNC: 98 MG/DL (ref 70–99)
HBA1C MFR BLD: 6.1 % (ref ?–5.7)
HCT VFR BLD AUTO: 41.5 % (ref 35–48)
HDLC SERPL-MCNC: 51 MG/DL (ref 40–59)
HGB BLD-MCNC: 13.4 G/DL (ref 12–16)
IMM GRANULOCYTES # BLD AUTO: 0.01 X10(3) UL (ref 0–1)
IMM GRANULOCYTES NFR BLD: 0.2 %
LDLC SERPL CALC-MCNC: 52 MG/DL (ref ?–100)
LYMPHOCYTES # BLD AUTO: 1.17 X10(3) UL (ref 1–4)
LYMPHOCYTES NFR BLD AUTO: 17.9 %
MCH RBC QN AUTO: 30.3 PG (ref 26–34)
MCHC RBC AUTO-ENTMCNC: 32.3 G/DL (ref 31–37)
MCV RBC AUTO: 93.9 FL (ref 80–100)
MONOCYTES # BLD AUTO: 0.74 X10(3) UL (ref 0.1–1)
MONOCYTES NFR BLD AUTO: 11.3 %
NEUTROPHILS # BLD AUTO: 4.46 X10 (3) UL (ref 1.5–7.7)
NEUTROPHILS # BLD AUTO: 4.46 X10(3) UL (ref 1.5–7.7)
NEUTROPHILS NFR BLD AUTO: 68.1 %
NONHDLC SERPL-MCNC: 70 MG/DL (ref ?–130)
OSMOLALITY SERPL CALC.SUM OF ELEC: 291 MOSM/KG (ref 275–295)
PLATELET # BLD AUTO: 273 10(3)UL (ref 150–450)
POTASSIUM SERPL-SCNC: 3.9 MMOL/L (ref 3.5–5.1)
PROT SERPL-MCNC: 6.9 G/DL (ref 5.7–8.2)
RBC # BLD AUTO: 4.42 X10(6)UL (ref 3.8–5.3)
SODIUM SERPL-SCNC: 141 MMOL/L (ref 136–145)
TRIGL SERPL-MCNC: 95 MG/DL (ref 30–149)
VLDLC SERPL CALC-MCNC: 14 MG/DL (ref 0–30)
WBC # BLD AUTO: 6.5 X10(3) UL (ref 4–11)

## 2025-08-20 PROCEDURE — 80061 LIPID PANEL: CPT

## 2025-08-20 PROCEDURE — 80053 COMPREHEN METABOLIC PANEL: CPT

## 2025-08-20 PROCEDURE — 83036 HEMOGLOBIN GLYCOSYLATED A1C: CPT

## 2025-08-20 PROCEDURE — 85025 COMPLETE CBC W/AUTO DIFF WBC: CPT

## 2025-08-20 PROCEDURE — 36415 COLL VENOUS BLD VENIPUNCTURE: CPT

## 2025-08-21 ENCOUNTER — OFFICE VISIT (OUTPATIENT)
Dept: FAMILY MEDICINE CLINIC | Facility: CLINIC | Age: 70
End: 2025-08-21

## 2025-08-21 VITALS
DIASTOLIC BLOOD PRESSURE: 74 MMHG | BODY MASS INDEX: 33 KG/M2 | OXYGEN SATURATION: 60 % | TEMPERATURE: 98 F | SYSTOLIC BLOOD PRESSURE: 122 MMHG | HEART RATE: 96 BPM | WEIGHT: 213.5 LBS

## 2025-08-21 DIAGNOSIS — Z12.31 VISIT FOR SCREENING MAMMOGRAM: Primary | ICD-10-CM

## 2025-08-21 DIAGNOSIS — E78.00 PURE HYPERCHOLESTEROLEMIA: ICD-10-CM

## 2025-08-21 DIAGNOSIS — Z00.00 ENCOUNTER FOR ANNUAL HEALTH EXAMINATION: ICD-10-CM

## 2025-08-21 DIAGNOSIS — M62.89 PELVIC FLOOR DYSFUNCTION: ICD-10-CM

## 2025-08-21 DIAGNOSIS — I25.10 ATHEROSCLEROSIS OF NATIVE CORONARY ARTERY OF NATIVE HEART WITHOUT ANGINA PECTORIS: ICD-10-CM

## 2025-08-21 DIAGNOSIS — I10 PRIMARY HYPERTENSION: ICD-10-CM

## 2025-08-21 DIAGNOSIS — G25.81 RESTLESS LEGS SYNDROME (RLS): ICD-10-CM

## 2025-08-21 PROBLEM — G47.33 OSA ON CPAP: Status: RESOLVED | Noted: 2021-06-28 | Resolved: 2025-08-21

## (undated) NOTE — LETTER
Date: 10/25/2019    Patient Name: Austin Dias          To Whom it may concern: This letter has been written at the patient's request. The above patient was seen at the [facility] for treatment of a medical condition.     This patient

## (undated) NOTE — LETTER
[facility], [facility], [place]  [address] [address]  PH: [phone]  FAX: [phone]              Date: 1/23/2025    Patient Name: Armida Suarez          To Whom it may concern:    The above patient was seen at [facility] for treatment of a medical condition.    The patient may return to work Wed 1/29/25.        Sincerely,    Ashely Morel MD

## (undated) NOTE — LETTER
Date: 6/7/2022    Patient Name: Soo Cat          To Whom it may concern: The above patient was seen at the [facility] for treatment of a medical condition. This patient should be excused from attending work. The patient may return to work Erin 15, 2022.         Sincerely,    Shay Benitez MD

## (undated) NOTE — MR AVS SNAPSHOT
[facility]  [address] [address]  [phone]               Thank you for choosing us for your health care visit with Shawn Flores MD.  We are glad to serve you and happy to provide you with this summary o Commonly known as:  ZANTAC           triamcinolone acetonide 0.1 % Crea   Apply topically 2 (two) times daily as needed.    Commonly known as:  KENALOG                   Results of Recent Testing       MyChart     Call the helpdesk for assistance with your priority on exercise in your life                    Visit [facility] online at  [email or web address]

## (undated) NOTE — LETTER
Date & Time: 10/3/2023, 9:34 AM  Patient: Frankie Suarez  Encounter Provider(s):    Karen Brown PA-C       To Whom It May Concern:    Jefe Chacon was seen and treated in our department on 10/3/2023. She should not return to work until Thursday, October 5th .     If you have any questions or concerns, please do not hesitate to call.        _____________________________  Physician/APC Signature

## (undated) NOTE — LETTER
[address], [facility] [address]  [phone]          Date: 9/26/2018      Patient Name: Sarah Silva            To Whom it may concern:     This letter has been written at th

## (undated) NOTE — LETTER
Patient Name: Vivek Nevarez  YOB: 1955          MRN number:  [id number]  Date:  11/19/2019  Referring Physician:  Dr. Carol Noguera    Discharge Summary  Pt has attended 11 visits in Physical Therapy.    Dx: Urge Incontinence             Dylan Proctor Spasm: Negative     Voluntary contraction: moderate  Voluntary relaxation: moderate     Pelvic Floor Muscle strength: (PERF= Power/Endurance/Reps/Fast)MMT:  4/9/9/8 (WAS:3/5/4/7)  Accessory Muscle Use:  ABLE TO ISOLATE PELVIC FLOOR MUSCLES NOW (WAS:abdomin

## (undated) NOTE — LETTER
Date: 2/25/2025    Patient Name: Armida Suarez          To Whom it may concern:    The above patient was seen at [facility] for treatment of a medical condition.    This patient should be excused from attending work extended to March 18th due to rescheduling of MRI.        Sincerely,    Ashely Morel MD

## (undated) NOTE — LETTER
Date: 11/14/2024    Patient Name: Armida Suarez          To Whom it may concern:    This letter has been written at the patient's request. The above patient was seen at [facility] for treatment of a medical condition.    This patient should be excused from attending work on 11/14/24.        Sincerely,    DUNCAN Ferrari

## (undated) NOTE — Clinical Note
02/25/25        Armida Suarez  [address]  [place] [address]      Dear Armida,    Our records indicate that you have outstanding lab work and or testing that was ordered for you and has not yet been completed:    To provide you with the best possible care, please complete these orders at your earliest convenience. If you have recently completed these orders please disregard this letter.     If you have any questions please call the office at No information on file..     Thank you,       Not in an encounter context.

## (undated) NOTE — LETTER
Date: 1/23/2025    Patient Name: Armida Suarez          To Whom it may concern:    The above patient was seen at [facility] for treatment of a medical condition.    The patient may return to work SUNDAY 1/26/2025.         Sincerely,    Ashely Morel MD

## (undated) NOTE — LETTER
[facility], [facility], [place]  [address] [address]  PH: [phone]  FAX: [phone]              3/14/25    Armida Suarez   53    Please extend patients absence from work through 25 pending a specialists consultation.    AGNIESZKA Morel MD/ GORDO Gonzalez RN

## (undated) NOTE — LETTER
Date: 1/9/2018    Patient Name: Pretty Kebede          To Whom it may concern: This letter has been written at the patient's request. The above patient was seen at the [facility] for treatment of a medical condition.     This patient eranu

## (undated) NOTE — LETTER
Date: 11/10/2021    Patient Name: Alka Moffett          To Whom it may concern: The above patient was seen at the [facility] for treatment of a medical condition. This patient should be excused from attending work today.     The pat

## (undated) NOTE — LETTER
[address] [place] [address]  Dept: [phone]  Dept Fax: [phone]         August 26, 2019    Patient: Mary Beth Becker   YOB: 1955   Date of Visit: 8/26/2019       To Whom It May Concern:

## (undated) NOTE — LETTER
Date: 2/10/2025    Patient Name: Armida Suarez          To Whom it may concern:    The above patient was seen at [facility] for treatment of a medical condition.    This patient should be excused from attending work for an additional 2 weeks. Delayed imaging.         Sincerely,    Ashely Morel MD

## (undated) NOTE — LETTER
Date: 4/2/2024    Patient Name: Armida Suarez          To Whom it may concern:    This letter has been written at the patient's request. The above patient was seen at [facility] for treatment of a medical condition.    This patient should be excused from attending work 4/3-4/4/2024.    The patient may return to work 4/7/2024.        Sincerely,    Ashely Morel MD